# Patient Record
Sex: FEMALE | Race: WHITE | NOT HISPANIC OR LATINO | Employment: STUDENT | URBAN - NONMETROPOLITAN AREA
[De-identification: names, ages, dates, MRNs, and addresses within clinical notes are randomized per-mention and may not be internally consistent; named-entity substitution may affect disease eponyms.]

---
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
## 2018-08-08 ENCOUNTER — OFFICE VISIT (OUTPATIENT)
Dept: OBSTETRICS AND GYNECOLOGY | Facility: CLINIC | Age: 18
End: 2018-08-08

## 2018-08-08 VITALS
SYSTOLIC BLOOD PRESSURE: 110 MMHG | DIASTOLIC BLOOD PRESSURE: 70 MMHG | HEIGHT: 61 IN | WEIGHT: 137 LBS | BODY MASS INDEX: 25.86 KG/M2

## 2018-08-08 DIAGNOSIS — N91.2 AMENORRHEA: Primary | ICD-10-CM

## 2018-08-08 LAB
B-HCG UR QL: NEGATIVE
INTERNAL NEGATIVE CONTROL: NEGATIVE
INTERNAL POSITIVE CONTROL: POSITIVE
Lab: NORMAL

## 2018-08-08 PROCEDURE — 81025 URINE PREGNANCY TEST: CPT | Performed by: OBSTETRICS & GYNECOLOGY

## 2018-08-08 PROCEDURE — 99202 OFFICE O/P NEW SF 15 MIN: CPT | Performed by: OBSTETRICS & GYNECOLOGY

## 2018-08-08 RX ORDER — MEDROXYPROGESTERONE ACETATE 10 MG/1
10 TABLET ORAL DAILY
Qty: 7 TABLET | Refills: 0 | Status: SHIPPED | OUTPATIENT
Start: 2018-08-08 | End: 2018-08-24

## 2018-08-08 NOTE — PROGRESS NOTES
Subjective   Wendy Newby is a 18 y.o. female is here today as a self referral. CC: irregular periods.     19 yo  LMP  here for irregular cycles. Patient reports that her periods have been irregular for as long as she can remember. Last periods was  and prior to that [id number]. Patient had previously tried OCPS for 2 to 3 months but didn't like the side effects and reports that she felt faint.       Menstrual Problem   This is a chronic problem. The current episode started more than 1 year ago. The problem occurs intermittently. Pertinent negatives include no change in bowel habit, chills, fatigue, fever, nausea, visual change or vomiting. Nothing aggravates the symptoms.     Review of Systems   Constitutional: Negative for chills, fatigue and fever.   Gastrointestinal: Negative for change in bowel habit, diarrhea, nausea and vomiting.   Genitourinary: Positive for menstrual problem.       Objective   Physical Exam   Constitutional: She appears well-developed and well-nourished.   HENT:   Head: Normocephalic and atraumatic.   Cardiovascular: Normal rate and regular rhythm.    Pulmonary/Chest: Effort normal and breath sounds normal.   Skin: Skin is warm and dry. No bruising noted.   Vitals reviewed.        Assessment/Plan   Problems Addressed this Visit     None      Visit Diagnoses     Amenorrhea    -  Primary    Relevant Medications    medroxyPROGESTERone (PROVERA) 10 MG tablet    Other Relevant Orders    POC Pregnancy, Urine (Completed)    Hemoglobin A1c    Cortisol    DHEA-Sulfate    Testosterone    Progesterone    Luteinizing Hormone    Insulin, Total    Follicle Stimulating Hormone    Estradiol    Comprehensive Metabolic Panel    TSH    T4, Free    Prolactin          -Patient to return in 2 weeks. Patient sent for labs at this time. Given provera withdraw.

## 2018-08-08 NOTE — PROGRESS NOTES
Attempted to obtain health maintenance information, patient unable to provide answers for the following items Tdap, Meningitis vaccine,  HPV Vaccine and annual exam.

## 2018-08-10 LAB
ALBUMIN SERPL-MCNC: 4.2 G/DL (ref 3.5–5)
ALBUMIN/GLOB SERPL: 1.4 G/DL (ref 1.1–2.5)
ALP SERPL-CCNC: 140 U/L (ref 24–120)
ALT SERPL-CCNC: 22 U/L (ref 0–54)
AST SERPL-CCNC: 28 U/L (ref 7–45)
BILIRUB SERPL-MCNC: 0.4 MG/DL (ref 0.1–1)
BUN SERPL-MCNC: 12 MG/DL (ref 5–21)
BUN/CREAT SERPL: 16.9 (ref 7–25)
CALCIUM SERPL-MCNC: 9.6 MG/DL (ref 8.4–10.4)
CHLORIDE SERPL-SCNC: 103 MMOL/L (ref 98–110)
CO2 SERPL-SCNC: 27 MMOL/L (ref 24–31)
CORTIS SERPL-MCNC: 21.9 UG/DL
CREAT SERPL-MCNC: 0.71 MG/DL (ref 0.5–1.4)
DHEA-S SERPL-MCNC: 364.1 UG/DL (ref 110–431.7)
ESTRADIOL SERPL-MCNC: 112.3 PG/ML
FSH SERPL-ACNC: 7.4 MIU/ML
GLOBULIN SER CALC-MCNC: 2.9 GM/DL
GLUCOSE SERPL-MCNC: 81 MG/DL (ref 70–100)
HBA1C MFR BLD: 4.9 %
INSULIN SERPL-ACNC: 13.1 UIU/ML (ref 2.6–24.9)
LH SERPL-ACNC: 37.5 MIU/ML
POTASSIUM SERPL-SCNC: 4.6 MMOL/L (ref 3.5–5.3)
PROGEST SERPL-MCNC: 0.5 NG/ML
PROLACTIN SERPL-MCNC: 22.6 NG/ML (ref 4.8–23.3)
PROT SERPL-MCNC: 7.1 G/DL (ref 6.3–8.7)
SODIUM SERPL-SCNC: 140 MMOL/L (ref 135–145)
T4 FREE SERPL-MCNC: 0.98 NG/DL (ref 0.78–2.19)
TESTOST SERPL-MCNC: 65 NG/DL (ref 8–48)
TSH SERPL DL<=0.005 MIU/L-ACNC: 2.68 MIU/ML (ref 0.47–4.68)

## 2018-08-23 ENCOUNTER — OFFICE VISIT (OUTPATIENT)
Dept: OBSTETRICS AND GYNECOLOGY | Facility: CLINIC | Age: 18
End: 2018-08-23

## 2018-08-23 VITALS
HEIGHT: 61 IN | BODY MASS INDEX: 26.24 KG/M2 | WEIGHT: 139 LBS | DIASTOLIC BLOOD PRESSURE: 82 MMHG | SYSTOLIC BLOOD PRESSURE: 122 MMHG

## 2018-08-23 DIAGNOSIS — E28.2 POLYCYSTIC DISEASE, OVARIES: Primary | ICD-10-CM

## 2018-08-23 PROCEDURE — 99213 OFFICE O/P EST LOW 20 MIN: CPT | Performed by: OBSTETRICS & GYNECOLOGY

## 2018-08-23 NOTE — PROGRESS NOTES
Attempted to obtain health maintenance information, patient unable to provide answers for the following items Tdap, HPV Vaccine and annual exam, and meningitis vaccine..

## 2018-08-23 NOTE — PROGRESS NOTES
Subjective   Wendy Newby is a 18 y.o. female  YOB: 2000    17 yo  presents for follow up. Patient completed her provera and reported bleeding that started on Saturday. Previous period was in 2018. Lab work revealed testosterone.     Chief Complaint   Patient presents with   • Amenorrhea     Here for 2 week follow up for amenorrhea. Here to review labs from 18.  States she had a heavy period that started on Saturday. and she is still on her period, lighter now.        HPI    No Known Allergies    Past Medical History:   Diagnosis Date   • Allergic     seasonal allergies   • Vertigo     r/t to severe allergy symptoms       Family History   Problem Relation Age of Onset   • Diabetes Paternal Grandmother    • No Known Problems Father    • No Known Problems Mother    • No Known Problems Sister    • Breast cancer Neg Hx    • Ovarian cancer Neg Hx    • Colon cancer Neg Hx        Social History     Social History   • Marital status: Single     Spouse name: N/A   • Number of children: N/A   • Years of education: N/A     Occupational History   • Not on file.     Social History Main Topics   • Smoking status: Never Smoker   • Smokeless tobacco: Never Used   • Alcohol use No   • Drug use: No   • Sexual activity: No     Other Topics Concern   • Not on file     Social History Narrative   • No narrative on file         Current Outpatient Prescriptions:   •  cetirizine (zyrTEC) 10 MG tablet, Take 10 mg by mouth Daily As Needed for allergies., Disp: , Rfl:   •  medroxyPROGESTERone (PROVERA) 10 MG tablet, Take 1 tablet by mouth Daily., Disp: 7 tablet, Rfl: 0    Patient's last menstrual period was 2018 (exact date).    Sexual History:         Could not be calculated    Past Surgical History:   Procedure Laterality Date   • NO PAST SURGERIES         Review of Systems   Constitutional: Negative for activity change and unexpected weight loss.   HENT: Negative for congestion.    Cardiovascular: Negative  "for chest pain.   Gastrointestinal: Negative for blood in stool, constipation and diarrhea.   Endocrine: Negative for cold intolerance and heat intolerance.   Genitourinary: Negative for dyspareunia, pelvic pain and vaginal discharge.   Musculoskeletal: Negative for arthralgias, back pain, neck pain and neck stiffness.   Skin: Negative for rash.   Neurological: Negative for dizziness and headache.   Psychiatric/Behavioral: Negative for sleep disturbance. The patient is not nervous/anxious.        Objective   Physical Exam   Constitutional: She appears well-developed and well-nourished.   HENT:   Head: Normocephalic and atraumatic.   Eyes: EOM are normal.   Neck: Normal range of motion.   Pulmonary/Chest: Effort normal.         Vitals:    08/23/18 0943   BP: 122/82   BP Location: Left arm   Patient Position: Sitting   Cuff Size: Adult   Weight: 63 kg (139 lb)   Height: 154.9 cm (61\")       Wendy was seen today for amenorrhea.    Diagnoses and all orders for this visit:    Polycystic disease, ovaries    -Patient to be started on birth control at this time- Natty  -Instructed to take OCPs same time every day.   -Follow up in 3 months     Leonor Ledesma,        "

## 2018-08-24 RX ORDER — DROSPIRENONE AND ETHINYL ESTRADIOL 0.02-3(28)
1 KIT ORAL DAILY
Qty: 28 TABLET | Refills: 3 | Status: SHIPPED | OUTPATIENT
Start: 2018-08-24 | End: 2018-12-18 | Stop reason: SDUPTHER

## 2018-12-12 RX ORDER — ETHINYL ESTRADIOL/DROSPIRENONE 0.02-3(28)
TABLET ORAL
Qty: 28 TABLET | Refills: 2 | OUTPATIENT
Start: 2018-12-12

## 2018-12-13 RX ORDER — ETHINYL ESTRADIOL/DROSPIRENONE 0.02-3(28)
TABLET ORAL
Qty: 28 TABLET | Refills: 2 | OUTPATIENT
Start: 2018-12-13

## 2018-12-18 ENCOUNTER — OFFICE VISIT (OUTPATIENT)
Dept: OBSTETRICS AND GYNECOLOGY | Facility: CLINIC | Age: 18
End: 2018-12-18

## 2018-12-18 VITALS
SYSTOLIC BLOOD PRESSURE: 112 MMHG | HEIGHT: 61 IN | DIASTOLIC BLOOD PRESSURE: 70 MMHG | BODY MASS INDEX: 26.06 KG/M2 | WEIGHT: 138 LBS

## 2018-12-18 DIAGNOSIS — N92.6 IRREGULAR MENSES: Primary | ICD-10-CM

## 2018-12-18 DIAGNOSIS — Z30.41 ENCOUNTER FOR SURVEILLANCE OF CONTRACEPTIVE PILLS: ICD-10-CM

## 2018-12-18 PROCEDURE — 99213 OFFICE O/P EST LOW 20 MIN: CPT | Performed by: OBSTETRICS & GYNECOLOGY

## 2018-12-18 RX ORDER — DROSPIRENONE AND ETHINYL ESTRADIOL 0.02-3(28)
1 KIT ORAL DAILY
Qty: 28 TABLET | Refills: 3 | Status: SHIPPED | OUTPATIENT
Start: 2018-12-18 | End: 2019-03-22 | Stop reason: SDUPTHER

## 2018-12-18 NOTE — PROGRESS NOTES
Subjective   Wendy Newby is a 18 y.o. female  YOB: 2000        Chief Complaint   Patient presents with   • Med Refill     Patient here for medication refill of her OCP. Patient states she is doing well with this OCP and wishes to remain on it. Patient states that when she is supposed to start her period with the pills she isn't starting until the end before she starts taking a new pack.        19 yo  Patient's last menstrual period was 2018 presents for follow up examination. Patient has been on Natty for the past three months. Reports that her cycles are regular and she is happy on the medications at this time.         No Known Allergies    Past Medical History:   Diagnosis Date   • Allergic     seasonal allergies   • Vertigo     r/t to severe allergy symptoms       Family History   Problem Relation Age of Onset   • Diabetes Paternal Grandmother    • No Known Problems Father    • No Known Problems Mother    • No Known Problems Sister    • Breast cancer Neg Hx    • Ovarian cancer Neg Hx    • Colon cancer Neg Hx        Social History     Socioeconomic History   • Marital status: Single     Spouse name: Not on file   • Number of children: Not on file   • Years of education: Not on file   • Highest education level: Not on file   Social Needs   • Financial resource strain: Not on file   • Food insecurity - worry: Not on file   • Food insecurity - inability: Not on file   • Transportation needs - medical: Not on file   • Transportation needs - non-medical: Not on file   Occupational History   • Not on file   Tobacco Use   • Smoking status: Never Smoker   • Smokeless tobacco: Never Used   Substance and Sexual Activity   • Alcohol use: No   • Drug use: No   • Sexual activity: No   Other Topics Concern   • Not on file   Social History Narrative   • Not on file         Current Outpatient Medications:   •  cetirizine (zyrTEC) 10 MG tablet, Take 10 mg by mouth Daily As Needed for allergies., Disp: ,  "Rfl:   •  drospirenone-ethinyl estradiol (BRITTNEY,GIANVI) 3-0.02 MG per tablet, Take 1 tablet by mouth Daily., Disp: 28 tablet, Rfl: 3    Patient's last menstrual period was 12/16/2018.    Sexual History:         Could not be calculated    Past Surgical History:   Procedure Laterality Date   • NO PAST SURGERIES         Review of Systems   Genitourinary: Negative for vaginal bleeding and vaginal discharge.       Objective   Physical Exam   Constitutional: She is oriented to person, place, and time. She appears well-developed and well-nourished. No distress.   HENT:   Head: Normocephalic and atraumatic.   Eyes: EOM are normal.   Neck: Normal range of motion. No thyromegaly present.   Cardiovascular: Normal rate and regular rhythm.   No murmur heard.  Pulmonary/Chest: Effort normal and breath sounds normal. She has no wheezes.   Abdominal: Soft. She exhibits no distension. There is no tenderness.   Musculoskeletal: Normal range of motion.   Neurological: She is alert and oriented to person, place, and time.   Skin: Skin is warm and dry.   Psychiatric: She has a normal mood and affect. Her behavior is normal. Judgment normal.   Nursing note and vitals reviewed.        Vitals:    12/18/18 1043   BP: 112/70   Weight: 62.6 kg (138 lb)   Height: 154.9 cm (61\")       Wendy was seen today for med refill.    Diagnoses and all orders for this visit:    Irregular menses  -     Cortisol    Encounter for surveillance of contraceptive pills    Other orders  -     drospirenone-ethinyl estradiol (BRITTNEY,GIANVI) 3-0.02 MG per tablet; Take 1 tablet by mouth Daily.    -OCP sent to pharmacy   -Return to clinic for annual examination in 9 months or prn.   -Cortisol leave reordered at this time with results to follow.     Leonor Ledesma, DO       "

## 2018-12-19 LAB — CORTIS SERPL-MCNC: 24.3 UG/DL

## 2019-01-07 ENCOUNTER — OFFICE VISIT (OUTPATIENT)
Dept: FAMILY MEDICINE CLINIC | Facility: CLINIC | Age: 19
End: 2019-01-07

## 2019-01-07 VITALS
RESPIRATION RATE: 16 BRPM | HEART RATE: 77 BPM | HEIGHT: 61 IN | SYSTOLIC BLOOD PRESSURE: 116 MMHG | BODY MASS INDEX: 25.11 KG/M2 | DIASTOLIC BLOOD PRESSURE: 80 MMHG | WEIGHT: 133 LBS | OXYGEN SATURATION: 97 %

## 2019-01-07 DIAGNOSIS — F41.9 ANXIETY: Primary | ICD-10-CM

## 2019-01-07 PROCEDURE — 99202 OFFICE O/P NEW SF 15 MIN: CPT | Performed by: FAMILY MEDICINE

## 2019-01-07 RX ORDER — ESCITALOPRAM OXALATE 10 MG/1
10 TABLET ORAL DAILY
Qty: 30 TABLET | Refills: 1 | Status: SHIPPED | OUTPATIENT
Start: 2019-01-07 | End: 2019-03-04 | Stop reason: SDUPTHER

## 2019-01-07 NOTE — PROGRESS NOTES
"Subjective   Wendy Newby is a 18 y.o. female.     Chief Complaint   Patient presents with   • Establish Care   • Anxiety     pt states that she has had issues w/ anxiety in the past, but it has gotten more intense recently       History of Present Illness     here today wtih her mother --she notes having anxiety for about 5 years---worse during the first day of her period...      Current Outpatient Medications:   •  cetirizine (zyrTEC) 10 MG tablet, Take 10 mg by mouth Daily As Needed for allergies., Disp: , Rfl:   •  drospirenone-ethinyl estradiol (BRITTNEY,GIANVI) 3-0.02 MG per tablet, Take 1 tablet by mouth Daily., Disp: 28 tablet, Rfl: 3  •  escitalopram (LEXAPRO) 10 MG tablet, Take 1 tablet by mouth Daily., Disp: 30 tablet, Rfl: 1  No Known Allergies    Past Medical History:   Diagnosis Date   • Allergic     seasonal allergies   • Vertigo     r/t to severe allergy symptoms     Past Surgical History:   Procedure Laterality Date   • NO PAST SURGERIES         Review of Systems   Constitutional: Negative.    HENT: Negative.    Eyes: Negative.    Respiratory: Negative.    Cardiovascular: Negative.    Gastrointestinal: Negative.    Endocrine: Negative.    Genitourinary: Negative.    Musculoskeletal: Negative.    Skin: Negative.    Allergic/Immunologic: Negative.    Neurological: Negative.    Hematological: Negative.    Psychiatric/Behavioral: Negative.        Objective  /80   Pulse 77   Resp 16   Ht 154.9 cm (61\")   Wt 60.3 kg (133 lb)   LMP 12/16/2018   SpO2 97%   BMI 25.13 kg/m²   Physical Exam   Constitutional: She is oriented to person, place, and time. She appears well-developed and well-nourished.   HENT:   Head: Normocephalic and atraumatic.   Right Ear: External ear normal.   Left Ear: External ear normal.   Nose: Nose normal.   Mouth/Throat: Oropharynx is clear and moist.   Eyes: Conjunctivae and EOM are normal. Pupils are equal, round, and reactive to light.   Neck: Normal range of motion. Neck " supple.   Cardiovascular: Normal rate, regular rhythm, normal heart sounds and intact distal pulses.   Pulmonary/Chest: Effort normal and breath sounds normal.   Abdominal: Soft. Bowel sounds are normal.   Musculoskeletal: Normal range of motion.   Neurological: She is alert and oriented to person, place, and time.   Skin: Skin is warm. Capillary refill takes less than 2 seconds.   Psychiatric: She has a normal mood and affect. Her behavior is normal. Judgment and thought content normal.   Nursing note and vitals reviewed.      Assessment/Plan   Wendy was seen today for establish care and anxiety.    Diagnoses and all orders for this visit:    Anxiety  -     TSH  -     T4, free    Other orders  -     escitalopram (LEXAPRO) 10 MG tablet; Take 1 tablet by mouth Daily.                  Orders Placed This Encounter   Procedures   • TSH   • T4, free       Follow up: 6 week(s)

## 2019-01-08 LAB
T4 FREE SERPL-MCNC: 1.2 NG/DL (ref 0.78–2.19)
TSH SERPL DL<=0.005 MIU/L-ACNC: 1.67 MIU/ML (ref 0.47–4.68)

## 2019-01-11 ENCOUNTER — LAB (OUTPATIENT)
Dept: LAB | Facility: HOSPITAL | Age: 19
End: 2019-01-11
Attending: FAMILY MEDICINE

## 2019-01-11 ENCOUNTER — TELEPHONE (OUTPATIENT)
Dept: FAMILY MEDICINE CLINIC | Facility: CLINIC | Age: 19
End: 2019-01-11

## 2019-01-11 DIAGNOSIS — R14.2 BELCHING: ICD-10-CM

## 2019-01-11 DIAGNOSIS — R14.2 BELCHING: Primary | ICD-10-CM

## 2019-01-11 PROCEDURE — 36415 COLL VENOUS BLD VENIPUNCTURE: CPT

## 2019-01-11 PROCEDURE — 86677 HELICOBACTER PYLORI ANTIBODY: CPT | Performed by: FAMILY MEDICINE

## 2019-01-11 NOTE — TELEPHONE ENCOUNTER
Pt is wanting to go to [facility] today to get them done will u put the orders in so they wont be calling with issues

## 2019-01-11 NOTE — TELEPHONE ENCOUNTER
Regarding: FW: Non-Urgent Medical Question  Contact: [phone]      ----- Message -----  From: Wendy Newby  Sent: 1/10/2019   2:37 PM  To: [facility]  Subject: Non-Urgent Medical Question                      ----- Message from Mychart, Generic sent at 1/10/2019  3:37 PM EST -----    Dr. Wheeler,    I would like to do the blood test for the H-Pylori. What do I need to do to get that set-up?    Thanks,  Wendy Germain for the h pylori blood test

## 2019-01-14 LAB
H PYLORI IGA SER IA-ACNC: <9 UNITS (ref 0–8.9)
H PYLORI IGG SER IA-ACNC: 0.43 INDEX VALUE (ref 0–0.79)
H PYLORI IGM SER-ACNC: <9 UNITS (ref 0–8.9)
H PYLORI IGM SER-ACNC: <9 UNITS (ref 0–8.9)

## 2019-01-15 ENCOUNTER — TELEPHONE (OUTPATIENT)
Dept: FAMILY MEDICINE CLINIC | Facility: CLINIC | Age: 19
End: 2019-01-15

## 2019-01-15 ENCOUNTER — OFFICE VISIT (OUTPATIENT)
Dept: OBSTETRICS AND GYNECOLOGY | Facility: CLINIC | Age: 19
End: 2019-01-15

## 2019-01-15 VITALS
BODY MASS INDEX: 24.73 KG/M2 | DIASTOLIC BLOOD PRESSURE: 82 MMHG | HEIGHT: 61 IN | WEIGHT: 131 LBS | SYSTOLIC BLOOD PRESSURE: 114 MMHG

## 2019-01-15 DIAGNOSIS — R79.89 ELEVATED MORNING SERUM CORTISOL LEVEL: ICD-10-CM

## 2019-01-15 DIAGNOSIS — E28.2 PCOS (POLYCYSTIC OVARIAN SYNDROME): Primary | ICD-10-CM

## 2019-01-15 DIAGNOSIS — F41.9 ANXIETY: ICD-10-CM

## 2019-01-15 DIAGNOSIS — R14.2 BELCHING: Primary | ICD-10-CM

## 2019-01-15 PROCEDURE — 99213 OFFICE O/P EST LOW 20 MIN: CPT | Performed by: OBSTETRICS & GYNECOLOGY

## 2019-01-15 NOTE — TELEPHONE ENCOUNTER
Regarding: FW: Test Results Question  Contact: [phone]   dont know if u got this yesterday or not  ----- Message -----  From: Wendy Newby  Sent: 1/14/2019   1:49 PM  To: [facility]  Subject: Test Results Question                            ----- Message from Mychart, Generic sent at 1/14/2019  2:49 PM EST -----    Dr. Wheeler,    I just saw that my test results came in for H-pylori and are negative. What is the next course of action?       Thanks,  Wendy Llamas should have a to do from yesterday

## 2019-01-16 ENCOUNTER — OFFICE VISIT (OUTPATIENT)
Dept: GASTROENTEROLOGY | Facility: CLINIC | Age: 19
End: 2019-01-16

## 2019-01-16 VITALS
OXYGEN SATURATION: 98 % | HEART RATE: 67 BPM | DIASTOLIC BLOOD PRESSURE: 70 MMHG | HEIGHT: 61 IN | SYSTOLIC BLOOD PRESSURE: 118 MMHG | WEIGHT: 133 LBS | BODY MASS INDEX: 25.11 KG/M2

## 2019-01-16 DIAGNOSIS — R10.13 DYSPEPSIA: Primary | ICD-10-CM

## 2019-01-16 DIAGNOSIS — F41.9 ANXIETY: ICD-10-CM

## 2019-01-16 PROCEDURE — 99203 OFFICE O/P NEW LOW 30 MIN: CPT | Performed by: CLINICAL NURSE SPECIALIST

## 2019-01-16 NOTE — PROGRESS NOTES
"Subjective   Wendy Newby is a 18 y.o. female.     Chief Complaint   Patient presents with   • Contraception     Pt is here to discuss her OCP. States she has had more anxiety lately.        19 yo  Patient's last menstrual period was 2018 presents for follow up examination. Patient reports that she has increased anxiety at this time. Patient was recently placed on Lexapro. Patient also had elevated cortisol on two separate occasions. No other complaints at this time.        Review of Systems   Psychiatric/Behavioral: The patient is nervous/anxious.      Objective   /82 (BP Location: Left arm, Patient Position: Sitting, Cuff Size: Adult)   Ht 154.9 cm (61\")   Wt 59.4 kg (131 lb)   LMP 2018   BMI 24.75 kg/m²   Patient's last menstrual period was 2018.  Physical Exam   Constitutional: She is oriented to person, place, and time. She appears well-developed and well-nourished.   HENT:   Head: Normocephalic and atraumatic.   Eyes: EOM are normal.   Neck: Normal range of motion.   Pulmonary/Chest: Effort normal.   Musculoskeletal: Normal range of motion.   Neurological: She is alert and oriented to person, place, and time.   Skin: Skin is warm and dry. No erythema.   Psychiatric: She has a normal mood and affect. Her behavior is normal. Judgment normal.   Nursing note and vitals reviewed.        Assessment/Plan   Problems Addressed this Visit        Other    Anxiety      Other Visit Diagnoses     PCOS (polycystic ovarian syndrome)    -  Primary    Elevated morning serum cortisol level          -Return to clinic in March for PAP smear or prn if symptoms get worse.        Leonor Ledesma, DO  "

## 2019-01-16 NOTE — PROGRESS NOTES
Wendy Newby  2000    1/16/2019  Chief Complaint   Patient presents with   • GI Problem     New patient ref by Dr. Wheeler for reflux and belching     Subjective   HPI  Wendy Newby is a 18 y.o. female who presents with a complaint of persistent ongoing gas and belching, dyspepsia for approx 2 months. No certain triggers other than some anxiety which she has just started lexapro for. She says that this has helped her slightly. Her PCP tested her for Hpylori and this was negative. She denies any diarrhea or constipation. No nausea or vomiting. No dysphagia. No wt loss. No melena. No bRBPR. No previous endoscopy. No tobacco, no NSAIDs. No food triggers identified.  Past Medical History:   Diagnosis Date   • Allergic     seasonal allergies   • Vertigo     r/t to severe allergy symptoms     Past Surgical History:   Procedure Laterality Date   • NO PAST SURGERIES         Outpatient Medications Marked as Taking for the 1/16/19 encounter (Office Visit) with Isadora Miranda APRN   Medication Sig Dispense Refill   • cetirizine (zyrTEC) 10 MG tablet Take 10 mg by mouth Daily As Needed for allergies.     • drospirenone-ethinyl estradiol (BRITTNEY,GIANVI) 3-0.02 MG per tablet Take 1 tablet by mouth Daily. 28 tablet 3   • escitalopram (LEXAPRO) 10 MG tablet Take 1 tablet by mouth Daily. 30 tablet 1     No Known Allergies  Social History     Socioeconomic History   • Marital status: Single     Spouse name: Not on file   • Number of children: Not on file   • Years of education: Not on file   • Highest education level: Not on file   Social Needs   • Financial resource strain: Not on file   • Food insecurity - worry: Not on file   • Food insecurity - inability: Not on file   • Transportation needs - medical: Not on file   • Transportation needs - non-medical: Not on file   Occupational History   • Not on file   Tobacco Use   • Smoking status: Never Smoker   • Smokeless tobacco: Never Used   Substance and Sexual Activity   •  Alcohol use: No   • Drug use: No   • Sexual activity: No     Birth control/protection: OCP   Other Topics Concern   • Not on file   Social History Narrative   • Not on file     Family History   Problem Relation Age of Onset   • Diabetes Paternal Grandmother    • No Known Problems Father    • No Known Problems Mother    • No Known Problems Sister    • Breast cancer Neg Hx    • Ovarian cancer Neg Hx    • Colon cancer Neg Hx    • Colon polyps Neg Hx      Health Maintenance   Topic Date Due   • ANNUAL PHYSICAL  03/07/2001   • HPV VACCINES (1 - Female 3-dose series) 03/07/2009   • MENINGOCOCCAL VACCINE (Normal Risk) (1 - 2-dose series) 03/07/2014   • TDAP/TD VACCINES (1 - Tdap) 03/07/2017   • INFLUENZA VACCINE  08/01/2018     Review of Systems   Constitutional: Negative for activity change, appetite change, chills, diaphoresis, fatigue, fever and unexpected weight change.   HENT: Negative for ear pain, hearing loss, mouth sores, sore throat, trouble swallowing and voice change.    Eyes: Negative.    Respiratory: Negative for cough, choking, shortness of breath and wheezing.    Cardiovascular: Negative for chest pain and palpitations.   Gastrointestinal: Negative for abdominal pain, blood in stool, constipation, diarrhea, nausea and vomiting.   Endocrine: Negative for cold intolerance and heat intolerance.   Genitourinary: Negative for decreased urine volume, dysuria, frequency, hematuria and urgency.   Musculoskeletal: Negative for back pain, gait problem and myalgias.   Skin: Negative for color change, pallor and rash.   Allergic/Immunologic: Negative for food allergies and immunocompromised state.   Neurological: Negative for dizziness, tremors, seizures, syncope, weakness, light-headedness, numbness and headaches.   Hematological: Negative for adenopathy. Does not bruise/bleed easily.   Psychiatric/Behavioral: Negative for agitation and confusion. The patient is not nervous/anxious.    All other systems reviewed and  "are negative.    Objective   Vitals:    01/16/19 1413   BP: 118/70   Pulse: 67   SpO2: 98%   Weight: 60.3 kg (133 lb)   Height: 154.9 cm (61\")     Body mass index is 25.13 kg/m².  Physical Exam   Constitutional: She is oriented to person, place, and time. She appears well-developed and well-nourished.   HENT:   Head: Normocephalic and atraumatic.   Eyes: Pupils are equal, round, and reactive to light.   Neck: Normal range of motion. Neck supple. No tracheal deviation present.   Cardiovascular: Normal rate, regular rhythm and normal heart sounds. Exam reveals no gallop and no friction rub.   No murmur heard.  Pulmonary/Chest: Effort normal and breath sounds normal. No respiratory distress. She has no wheezes. She has no rales. She exhibits no tenderness.   Abdominal: Soft. Bowel sounds are normal. She exhibits no distension. There is no hepatosplenomegaly. There is no tenderness. There is no rigidity, no rebound and no guarding.   Musculoskeletal: Normal range of motion. She exhibits no edema, tenderness or deformity.   Neurological: She is alert and oriented to person, place, and time. She has normal reflexes.   Skin: Skin is warm and dry. No rash noted. No pallor.   Psychiatric: She has a normal mood and affect. Her behavior is normal. Judgment and thought content normal.     Assessment/Plan   Wendy was seen today for gi problem.    Diagnoses and all orders for this visit:    Dyspepsia    Anxiety    suggested she start a trial of Zantac and treat her gas with otc regimen. I also went over food triggers and would like for her to be on her Lexapro a little longer to see if symptoms improve or go away with management of her anxiety she does feel as if this could be a contributing factor.     EMR Dragon/transcription disclaimer: Much of this encounter note is electronic transcription/translation of spoken language to printed text. The electronic translation of spoken language may be erroneous, or at times, nonsensical " words or phrases may be inadvertently transcribed. Although I have reviewed the note for such errors, some may still exist.  Body mass index is 25.13 kg/m².  Return in about 1 month (around 2/16/2019).    Patient's Body mass index is 25.13 kg/m². BMI is within normal parameters. No follow-up required..      All risks, benefits, alternatives, and indications of colonoscopy and/or Endoscopy procedure have been discussed with the patient. Risks to include perforation of the colon requiring possible surgery or colostomy, risk of bleeding from biopsies or removal of colon tissue, possibility of missing a colon polyp or cancer, or adverse drug reaction.  Benefits to include the diagnosis and management of disease of the colon and rectum. Alternatives to include barium enema, radiographic evaluation, lab testing or no intervention. Pt verbalizes understanding and agrees.     Isadora Miranda, APRN  1/16/2019  3:00 PM      Obesity, Adult  Obesity is the condition of having too much total body fat. Being overweight or obese means that your weight is greater than what is considered healthy for your body size. Obesity is determined by a measurement called BMI. BMI is an estimate of body fat and is calculated from height and weight. For adults, a BMI of 30 or higher is considered obese.  Obesity can eventually lead to other health concerns and major illnesses, including:  · Stroke.  · Coronary artery disease (CAD).  · Type 2 diabetes.  · Some types of cancer, including cancers of the colon, breast, uterus, and gallbladder.  · Osteoarthritis.  · High blood pressure (hypertension).  · High cholesterol.  · Sleep apnea.  · Gallbladder stones.  · Infertility problems.  What are the causes?  The main cause of obesity is taking in (consuming) more calories than your body uses for energy. Other factors that contribute to this condition may include:  · Being born with genes that make you more likely to become obese.  · Having a  medical condition that causes obesity. These conditions include:  ¨ Hypothyroidism.  ¨ Polycystic ovarian syndrome (PCOS).  ¨ Binge-eating disorder.  ¨ Cushing syndrome.  · Taking certain medicines, such as steroids, antidepressants, and seizure medicines.  · Not being physically active (sedentary lifestyle).  · Living where there are limited places to exercise safely or buy healthy foods.  · Not getting enough sleep.  What increases the risk?  The following factors may increase your risk of this condition:  · Having a family history of obesity.  · Being a woman of -American descent.  · Being a man of  descent.  What are the signs or symptoms?  Having excessive body fat is the main symptom of this condition.  How is this diagnosed?  This condition may be diagnosed based on:  · Your symptoms.  · Your medical history.  · A physical exam. Your health care provider may measure:  ¨ Your BMI. If you are an adult with a BMI between 25 and less than 30, you are considered overweight. If you are an adult with a BMI of 30 or higher, you are considered obese.  ¨ The distances around your hips and your waist (circumferences). These may be compared to each other to help diagnose your condition.  ¨ Your skinfold thickness. Your health care provider may gently pinch a fold of your skin and measure it.  How is this treated?  Treatment for this condition often includes changing your lifestyle. Treatment may include some or all of the following:  · Dietary changes. Work with your health care provider and a dietitian to set a weight-loss goal that is healthy and reasonable for you. Dietary changes may include eating:  ¨ Smaller portions. A portion size is the amount of a particular food that is healthy for you to eat at one time. This varies from person to person.  ¨ Low-calorie or low-fat options.  ¨ More whole grains, fruits, and vegetables.  · Regular physical activity. This may include aerobic activity (cardio) and  strength training.  · Medicine to help you lose weight. Your health care provider may prescribe medicine if you are unable to lose 1 pound a week after 6 weeks of eating more healthily and doing more physical activity.  · Surgery. Surgical options may include gastric banding and gastric bypass. Surgery may be done if:  ¨ Other treatments have not helped to improve your condition.  ¨ You have a BMI of 40 or higher.  ¨ You have life-threatening health problems related to obesity.  Follow these instructions at home:     Eating and drinking     · Follow recommendations from your health care provider about what you eat and drink. Your health care provider may advise you to:  ¨ Limit fast foods, sweets, and processed snack foods.  ¨ Choose low-fat options, such as low-fat milk instead of whole milk.  ¨ Eat 5 or more servings of fruits or vegetables every day.  ¨ Eat at home more often. This gives you more control over what you eat.  ¨ Choose healthy foods when you eat out.  ¨ Learn what a healthy portion size is.  ¨ Keep low-fat snacks on hand.  ¨ Avoid sugary drinks, such as soda, fruit juice, iced tea sweetened with sugar, and flavored milk.  ¨ Eat a healthy breakfast.  · Drink enough water to keep your urine clear or pale yellow.  · Do not go without eating for long periods of time (do not fast) or follow a fad diet. Fasting and fad diets can be unhealthy and even dangerous.  Physical Activity   · Exercise regularly, as told by your health care provider. Ask your health care provider what types of exercise are safe for you and how often you should exercise.  · Warm up and stretch before being active.  · Cool down and stretch after being active.  · Rest between periods of activity.  Lifestyle   · Limit the time that you spend in front of your TV, computer, or video game system.  · Find ways to reward yourself that do not involve food.  · Limit alcohol intake to no more than 1 drink a day for nonpregnant women and 2  drinks a day for men. One drink equals 12 oz of beer, 5 oz of wine, or 1½ oz of hard liquor.  General instructions   · Keep a weight loss journal to keep track of the food you eat and how much you exercise you get.  · Take over-the-counter and prescription medicines only as told by your health care provider.  · Take vitamins and supplements only as told by your health care provider.  · Consider joining a support group. Your health care provider may be able to recommend a support group.  · Keep all follow-up visits as told by your health care provider. This is important.  Contact a health care provider if:  · You are unable to meet your weight loss goal after 6 weeks of dietary and lifestyle changes.  This information is not intended to replace advice given to you by your health care provider. Make sure you discuss any questions you have with your health care provider.  Document Released: 01/25/2006 Document Revised: 05/22/2017 Document Reviewed: 10/05/2016  [employer] Interactive Patient Education © 2017 [employer]      If you smoke or use tobacco, 4 minutes reading provided  Steps to Quit Smoking  Smoking tobacco can be harmful to your health and can affect almost every organ in your body. Smoking puts you, and those around you, at risk for developing many serious chronic diseases. Quitting smoking is difficult, but it is one of the best things that you can do for your health. It is never too late to quit.  What are the benefits of quitting smoking?  When you quit smoking, you lower your risk of developing serious diseases and conditions, such as:  · Lung cancer or lung disease, such as COPD.  · Heart disease.  · Stroke.  · Heart attack.  · Infertility.  · Osteoporosis and bone fractures.  Additionally, symptoms such as coughing, wheezing, and shortness of breath may get better when you quit. You may also find that you get sick less often because your body is stronger at fighting off colds and infections. If you  are pregnant, quitting smoking can help to reduce your chances of having a baby of low birth weight.  How do I get ready to quit?  When you decide to quit smoking, create a plan to make sure that you are successful. Before you quit:  · Pick a date to quit. Set a date within the next two weeks to give you time to prepare.  · Write down the reasons why you are quitting. Keep this list in places where you will see it often, such as on your bathroom mirror or in your car or wallet.  · Identify the people, places, things, and activities that make you want to smoke (triggers) and avoid them. Make sure to take these actions:  ¨ Throw away all cigarettes at home, at work, and in your car.  ¨ Throw away smoking accessories, such as ashtrays and lighters.  ¨ Clean your car and make sure to empty the ashtray.  ¨ Clean your home, including curtains and carpets.  · Tell your family, friends, and coworkers that you are quitting. Support from your loved ones can make quitting easier.  · Talk with your health care provider about your options for quitting smoking.  · Find out what treatment options are covered by your health insurance.  What strategies can I use to quit smoking?  Talk with your healthcare provider about different strategies to quit smoking. Some strategies include:  · Quitting smoking altogether instead of gradually lessening how much you smoke over a period of time. Research shows that quitting “cold turkey” is more successful than gradually quitting.  · Attending in-person counseling to help you build problem-solving skills. You are more likely to have success in quitting if you attend several counseling sessions. Even short sessions of 10 minutes can be effective.  · Finding resources and support systems that can help you to quit smoking and remain smoke-free after you quit. These resources are most helpful when you use them often. They can include:  ¨ Online chats with a counselor.  ¨ Telephone  quitlines.  ¨ Printed self-help materials.  ¨ Support groups or group counseling.  ¨ Text messaging programs.  ¨ Mobile phone applications.  · Taking medicines to help you quit smoking. (If you are pregnant or breastfeeding, talk with your health care provider first.) Some medicines contain nicotine and some do not. Both types of medicines help with cravings, but the medicines that include nicotine help to relieve withdrawal symptoms. Your health care provider may recommend:  ¨ Nicotine patches, gum, or lozenges.  ¨ Nicotine inhalers or sprays.  ¨ Non-nicotine medicine that is taken by mouth.  Talk with your health care provider about combining strategies, such as taking medicines while you are also receiving in-person counseling. Using these two strategies together makes you more likely to succeed in quitting than if you used either strategy on its own.  If you are pregnant or breastfeeding, talk with your health care provider about finding counseling or other support strategies to quit smoking. Do not take medicine to help you quit smoking unless told to do so by your health care provider.  What things can I do to make it easier to quit?  Quitting smoking might feel overwhelming at first, but there is a lot that you can do to make it easier. Take these important actions:  · Reach out to your family and friends and ask that they support and encourage you during this time. Call telephone quitlines, reach out to support groups, or work with a counselor for support.  · Ask people who smoke to avoid smoking around you.  · Avoid places that trigger you to smoke, such as bars, parties, or smoke-break areas at work.  · Spend time around people who do not smoke.  · Lessen stress in your life, because stress can be a smoking trigger for some people. To lessen stress, try:  ¨ Exercising regularly.  ¨ Deep-breathing exercises.  ¨ Yoga.  ¨ Meditating.  ¨ Performing a body scan. This involves closing your eyes, scanning your  body from head to toe, and noticing which parts of your body are particularly tense. Purposefully relax the muscles in those areas.  · Download or purchase mobile phone or tablet apps (applications) that can help you stick to your quit plan by providing reminders, tips, and encouragement. There are many free apps, such as QuitGuide from the CDC (Centers for Disease Control and Prevention). You can find other support for quitting smoking (smoking cessation) through smokefree.gov and other websites.  How will I feel when I quit smoking?  Within the first 24 hours of quitting smoking, you may start to feel some withdrawal symptoms. These symptoms are usually most noticeable 2-3 days after quitting, but they usually do not last beyond 2-3 weeks. Changes or symptoms that you might experience include:  · Mood swings.  · Restlessness, anxiety, or irritation.  · Difficulty concentrating.  · Dizziness.  · Strong cravings for sugary foods in addition to nicotine.  · Mild weight gain.  · Constipation.  · Nausea.  · Coughing or a sore throat.  · Changes in how your medicines work in your body.  · A depressed mood.  · Difficulty sleeping (insomnia).  After the first 2-3 weeks of quitting, you may start to notice more positive results, such as:  · Improved sense of smell and taste.  · Decreased coughing and sore throat.  · Slower heart rate.  · Lower blood pressure.  · Clearer skin.  · The ability to breathe more easily.  · Fewer sick days.  Quitting smoking is very challenging for most people. Do not get discouraged if you are not successful the first time. Some people need to make many attempts to quit before they achieve long-term success. Do your best to stick to your quit plan, and talk with your health care provider if you have any questions or concerns.  This information is not intended to replace advice given to you by your health care provider. Make sure you discuss any questions you have with your health care  provider.  Document Released: 12/12/2002 Document Revised: 08/15/2017 Document Reviewed: 05/03/2016  [employer] Interactive Patient Education © 2017 [employer]

## 2019-02-18 ENCOUNTER — OFFICE VISIT (OUTPATIENT)
Dept: FAMILY MEDICINE CLINIC | Facility: CLINIC | Age: 19
End: 2019-02-18

## 2019-02-18 VITALS
HEIGHT: 61 IN | WEIGHT: 137 LBS | BODY MASS INDEX: 25.86 KG/M2 | OXYGEN SATURATION: 99 % | SYSTOLIC BLOOD PRESSURE: 116 MMHG | DIASTOLIC BLOOD PRESSURE: 80 MMHG | RESPIRATION RATE: 18 BRPM | TEMPERATURE: 98.1 F | HEART RATE: 89 BPM

## 2019-02-18 DIAGNOSIS — F41.9 ANXIETY: Primary | ICD-10-CM

## 2019-02-18 PROCEDURE — 99213 OFFICE O/P EST LOW 20 MIN: CPT | Performed by: FAMILY MEDICINE

## 2019-02-18 NOTE — PROGRESS NOTES
"Subjective   Wendy Newby is a 18 y.o. female.     Chief Complaint   Patient presents with   • Anxiety     6 month follow-up.       History of Present Illness     she thinks her anxiety is much better---sleeping better--denies any side effects from the medication      Current Outpatient Medications:   •  cetirizine (zyrTEC) 10 MG tablet, Take 10 mg by mouth Daily As Needed for allergies., Disp: , Rfl:   •  drospirenone-ethinyl estradiol (BRITTNEY,GIANVI) 3-0.02 MG per tablet, Take 1 tablet by mouth Daily., Disp: 28 tablet, Rfl: 3  •  escitalopram (LEXAPRO) 10 MG tablet, Take 1 tablet by mouth Daily., Disp: 30 tablet, Rfl: 1  No Known Allergies    Past Medical History:   Diagnosis Date   • Allergic     seasonal allergies   • Vertigo     r/t to severe allergy symptoms     Past Surgical History:   Procedure Laterality Date   • NO PAST SURGERIES         Review of Systems   Constitutional: Negative.    HENT: Negative.    Eyes: Negative.    Respiratory: Negative.    Cardiovascular: Negative.    Gastrointestinal: Negative.    Endocrine: Negative.    Genitourinary: Negative.    Musculoskeletal: Negative.    Skin: Negative.    Allergic/Immunologic: Negative.    Neurological: Negative.    Hematological: Negative.    Psychiatric/Behavioral: The patient is nervous/anxious.        Objective  /80 (BP Location: Left arm, Patient Position: Sitting, Cuff Size: Adult)   Pulse 89   Temp 98.1 °F (36.7 °C) (Oral)   Resp 18   Ht 154.9 cm (61\")   Wt 62.1 kg (137 lb)   SpO2 99%   BMI 25.89 kg/m²   Physical Exam   Constitutional: She is oriented to person, place, and time. She appears well-developed and well-nourished.   HENT:   Head: Normocephalic and atraumatic.   Right Ear: External ear normal.   Left Ear: External ear normal.   Nose: Nose normal.   Mouth/Throat: Oropharynx is clear and moist.   Eyes: Conjunctivae and EOM are normal. Pupils are equal, round, and reactive to light.   Neck: Normal range of motion. Neck supple. "   Cardiovascular: Normal rate, regular rhythm, normal heart sounds and intact distal pulses.   Pulmonary/Chest: Effort normal and breath sounds normal.   Abdominal: Soft. Bowel sounds are normal.   Musculoskeletal: Normal range of motion.   Neurological: She is alert and oriented to person, place, and time.   Skin: Skin is warm. Capillary refill takes less than 2 seconds.   Psychiatric: She has a normal mood and affect. Her behavior is normal. Judgment and thought content normal.   Nursing note and vitals reviewed.      Assessment/Plan   Wendy was seen today for anxiety.    Diagnoses and all orders for this visit:    Anxiety                 No orders of the defined types were placed in this encounter.      Follow up: 6 month(s)

## 2019-02-19 ENCOUNTER — OFFICE VISIT (OUTPATIENT)
Dept: GASTROENTEROLOGY | Facility: CLINIC | Age: 19
End: 2019-02-19

## 2019-02-19 VITALS
HEART RATE: 93 BPM | WEIGHT: 133 LBS | BODY MASS INDEX: 25.11 KG/M2 | SYSTOLIC BLOOD PRESSURE: 128 MMHG | DIASTOLIC BLOOD PRESSURE: 72 MMHG | OXYGEN SATURATION: 99 % | HEIGHT: 61 IN

## 2019-02-19 DIAGNOSIS — F41.9 ANXIETY: ICD-10-CM

## 2019-02-19 DIAGNOSIS — R10.13 DYSPEPSIA: Primary | ICD-10-CM

## 2019-02-19 PROCEDURE — 99212 OFFICE O/P EST SF 10 MIN: CPT | Performed by: CLINICAL NURSE SPECIALIST

## 2019-02-19 NOTE — PROGRESS NOTES
Wendy Newby  2000      2/19/2019  Chief Complaint   Patient presents with   • GI Problem     Here to discuss reflux doing better since taking anxiety medicine         HPI    Wendy Newby is a  18 y.o. female here for a follow up visit for complaint of persistent ongoing gas and bleching, dyspepsia for approx 2 months. She felt it was related to her anxiety level and had just started lexapro. Today she says that she is better. No persistent symptoms of reflux or GERD.     Past Medical History:   Diagnosis Date   • Allergic     seasonal allergies   • Vertigo     r/t to severe allergy symptoms     Past Surgical History:   Procedure Laterality Date   • NO PAST SURGERIES         Outpatient Medications Marked as Taking for the 2/19/19 encounter (Office Visit) with Isadora Miranda APRN   Medication Sig Dispense Refill   • cetirizine (zyrTEC) 10 MG tablet Take 10 mg by mouth Daily As Needed for allergies.     • drospirenone-ethinyl estradiol (BRITTNEY,GIANVI) 3-0.02 MG per tablet Take 1 tablet by mouth Daily. 28 tablet 3   • escitalopram (LEXAPRO) 10 MG tablet Take 1 tablet by mouth Daily. 30 tablet 1       No Known Allergies    Social History     Socioeconomic History   • Marital status: Single     Spouse name: Not on file   • Number of children: Not on file   • Years of education: Not on file   • Highest education level: Not on file   Social Needs   • Financial resource strain: Not on file   • Food insecurity - worry: Not on file   • Food insecurity - inability: Not on file   • Transportation needs - medical: Not on file   • Transportation needs - non-medical: Not on file   Occupational History   • Not on file   Tobacco Use   • Smoking status: Never Smoker   • Smokeless tobacco: Never Used   Substance and Sexual Activity   • Alcohol use: No   • Drug use: No   • Sexual activity: No     Birth control/protection: OCP   Other Topics Concern   • Not on file   Social History Narrative   • Not on file       Family  "History   Problem Relation Age of Onset   • Diabetes Paternal Grandmother    • No Known Problems Father    • No Known Problems Mother    • No Known Problems Sister    • Breast cancer Neg Hx    • Ovarian cancer Neg Hx    • Colon cancer Neg Hx    • Colon polyps Neg Hx        Review of Systems   Constitutional: Negative for activity change, appetite change, chills, diaphoresis, fatigue, fever and unexpected weight change.   HENT: Negative for ear pain, hearing loss, mouth sores, sore throat, trouble swallowing and voice change.    Eyes: Negative.    Respiratory: Negative for cough, choking, shortness of breath and wheezing.    Cardiovascular: Negative for chest pain and palpitations.   Gastrointestinal: Negative for abdominal pain, blood in stool, constipation, diarrhea, nausea and vomiting.   Endocrine: Negative for cold intolerance and heat intolerance.   Genitourinary: Negative for decreased urine volume, dysuria, frequency, hematuria and urgency.   Musculoskeletal: Negative for back pain, gait problem and myalgias.   Skin: Negative for color change, pallor and rash.   Allergic/Immunologic: Negative for food allergies and immunocompromised state.   Neurological: Negative for dizziness, tremors, seizures, syncope, weakness, light-headedness, numbness and headaches.   Hematological: Negative for adenopathy. Does not bruise/bleed easily.   Psychiatric/Behavioral: Negative for agitation and confusion. The patient is not nervous/anxious.    All other systems reviewed and are negative.      /72   Pulse 93   Ht 154.9 cm (61\")   Wt 60.3 kg (133 lb)   SpO2 99%   Breastfeeding? No   BMI 25.13 kg/m²   Body mass index is 25.13 kg/m².    Physical Exam   Constitutional: She is oriented to person, place, and time. She appears well-developed and well-nourished.   HENT:   Head: Normocephalic and atraumatic.   Eyes: Pupils are equal, round, and reactive to light.   Neck: Normal range of motion. Neck supple. No tracheal " deviation present.   Cardiovascular: Normal rate, regular rhythm and normal heart sounds. Exam reveals no gallop and no friction rub.   No murmur heard.  Pulmonary/Chest: Effort normal and breath sounds normal. No respiratory distress. She has no wheezes. She has no rales. She exhibits no tenderness.   Abdominal: Soft. Bowel sounds are normal. She exhibits no distension. There is no hepatosplenomegaly. There is no tenderness. There is no rigidity, no rebound and no guarding.   Musculoskeletal: Normal range of motion. She exhibits no edema, tenderness or deformity.   Neurological: She is alert and oriented to person, place, and time. She has normal reflexes.   Skin: Skin is warm and dry. No rash noted. No pallor.   Psychiatric: She has a normal mood and affect. Her behavior is normal. Judgment and thought content normal.       ASSESSMENT AND PLAN    Patient's Body mass index is 25.13 kg/m². BMI is within normal parameters. No follow-up required..    Wendy was seen today for gi problem.    Diagnoses and all orders for this visit:    Dyspepsia    Anxiety      Improved, to call us if needed or symptoms return or persist. Happy to see back anytime.     There are no Patient Instructions on file for this visit.  Isadora Miranda, APRN  1:22 PM  2/19/2019    Obesity, Adult  Obesity is the condition of having too much total body fat. Being overweight or obese means that your weight is greater than what is considered healthy for your body size. Obesity is determined by a measurement called BMI. BMI is an estimate of body fat and is calculated from height and weight. For adults, a BMI of 30 or higher is considered obese.  Obesity can eventually lead to other health concerns and major illnesses, including:  · Stroke.  · Coronary artery disease (CAD).  · Type 2 diabetes.  · Some types of cancer, including cancers of the colon, breast, uterus, and gallbladder.  · Osteoarthritis.  · High blood pressure (hypertension).  · High  cholesterol.  · Sleep apnea.  · Gallbladder stones.  · Infertility problems.  What are the causes?  The main cause of obesity is taking in (consuming) more calories than your body uses for energy. Other factors that contribute to this condition may include:  · Being born with genes that make you more likely to become obese.  · Having a medical condition that causes obesity. These conditions include:  ¨ Hypothyroidism.  ¨ Polycystic ovarian syndrome (PCOS).  ¨ Binge-eating disorder.  ¨ Cushing syndrome.  · Taking certain medicines, such as steroids, antidepressants, and seizure medicines.  · Not being physically active (sedentary lifestyle).  · Living where there are limited places to exercise safely or buy healthy foods.  · Not getting enough sleep.  What increases the risk?  The following factors may increase your risk of this condition:  · Having a family history of obesity.  · Being a woman of -American descent.  · Being a man of  descent.  What are the signs or symptoms?  Having excessive body fat is the main symptom of this condition.  How is this diagnosed?  This condition may be diagnosed based on:  · Your symptoms.  · Your medical history.  · A physical exam. Your health care provider may measure:  ¨ Your BMI. If you are an adult with a BMI between 25 and less than 30, you are considered overweight. If you are an adult with a BMI of 30 or higher, you are considered obese.  ¨ The distances around your hips and your waist (circumferences). These may be compared to each other to help diagnose your condition.  ¨ Your skinfold thickness. Your health care provider may gently pinch a fold of your skin and measure it.  How is this treated?  Treatment for this condition often includes changing your lifestyle. Treatment may include some or all of the following:  · Dietary changes. Work with your health care provider and a dietitian to set a weight-loss goal that is healthy and reasonable for you.  Dietary changes may include eating:  ¨ Smaller portions. A portion size is the amount of a particular food that is healthy for you to eat at one time. This varies from person to person.  ¨ Low-calorie or low-fat options.  ¨ More whole grains, fruits, and vegetables.  · Regular physical activity. This may include aerobic activity (cardio) and strength training.  · Medicine to help you lose weight. Your health care provider may prescribe medicine if you are unable to lose 1 pound a week after 6 weeks of eating more healthily and doing more physical activity.  · Surgery. Surgical options may include gastric banding and gastric bypass. Surgery may be done if:  ¨ Other treatments have not helped to improve your condition.  ¨ You have a BMI of 40 or higher.  ¨ You have life-threatening health problems related to obesity.  Follow these instructions at home:     Eating and drinking     · Follow recommendations from your health care provider about what you eat and drink. Your health care provider may advise you to:  ¨ Limit fast foods, sweets, and processed snack foods.  ¨ Choose low-fat options, such as low-fat milk instead of whole milk.  ¨ Eat 5 or more servings of fruits or vegetables every day.  ¨ Eat at home more often. This gives you more control over what you eat.  ¨ Choose healthy foods when you eat out.  ¨ Learn what a healthy portion size is.  ¨ Keep low-fat snacks on hand.  ¨ Avoid sugary drinks, such as soda, fruit juice, iced tea sweetened with sugar, and flavored milk.  ¨ Eat a healthy breakfast.  · Drink enough water to keep your urine clear or pale yellow.  · Do not go without eating for long periods of time (do not fast) or follow a fad diet. Fasting and fad diets can be unhealthy and even dangerous.  Physical Activity   · Exercise regularly, as told by your health care provider. Ask your health care provider what types of exercise are safe for you and how often you should exercise.  · Warm up and stretch  before being active.  · Cool down and stretch after being active.  · Rest between periods of activity.  Lifestyle   · Limit the time that you spend in front of your TV, computer, or video game system.  · Find ways to reward yourself that do not involve food.  · Limit alcohol intake to no more than 1 drink a day for nonpregnant women and 2 drinks a day for men. One drink equals 12 oz of beer, 5 oz of wine, or 1½ oz of hard liquor.  General instructions   · Keep a weight loss journal to keep track of the food you eat and how much you exercise you get.  · Take over-the-counter and prescription medicines only as told by your health care provider.  · Take vitamins and supplements only as told by your health care provider.  · Consider joining a support group. Your health care provider may be able to recommend a support group.  · Keep all follow-up visits as told by your health care provider. This is important.  Contact a health care provider if:  · You are unable to meet your weight loss goal after 6 weeks of dietary and lifestyle changes.  This information is not intended to replace advice given to you by your health care provider. Make sure you discuss any questions you have with your health care provider.  Document Released: 01/25/2006 Document Revised: 05/22/2017 Document Reviewed: 10/05/2016  [employer] Interactive Patient Education © 2017 [employer]      IF YOU SMOKE OR USE TOBACCO PLEASE READ THE FOLLOWING:    Why is smoking bad for me?  Smoking increases the risk of heart disease, lung disease, vascular disease, stroke, and cancer.     If you smoke, STOP!    If you would like more information on quitting smoking, please visit the [employer] website: [email or web address]   This link will provide additional resources including the QUIT line and the [employer] support groups.     For more information:    Quit Now  [place]  1-800-QUIT-NOW  https://[place].quitlogix.org/en-[place]/

## 2019-03-05 RX ORDER — ESCITALOPRAM OXALATE 10 MG/1
TABLET ORAL
Qty: 30 TABLET | Refills: 2 | Status: SHIPPED | OUTPATIENT
Start: 2019-03-05 | End: 2019-06-08 | Stop reason: SDUPTHER

## 2019-03-22 ENCOUNTER — OFFICE VISIT (OUTPATIENT)
Dept: OBSTETRICS AND GYNECOLOGY | Facility: CLINIC | Age: 19
End: 2019-03-22

## 2019-03-22 VITALS
WEIGHT: 135 LBS | BODY MASS INDEX: 25.49 KG/M2 | HEIGHT: 61 IN | DIASTOLIC BLOOD PRESSURE: 70 MMHG | SYSTOLIC BLOOD PRESSURE: 116 MMHG

## 2019-03-22 DIAGNOSIS — E28.2 PCOS (POLYCYSTIC OVARIAN SYNDROME): ICD-10-CM

## 2019-03-22 DIAGNOSIS — Z12.4 SCREENING FOR CERVICAL CANCER: ICD-10-CM

## 2019-03-22 DIAGNOSIS — Z01.419 ENCOUNTER FOR GYNECOLOGICAL EXAMINATION WITHOUT ABNORMAL FINDING: Primary | ICD-10-CM

## 2019-03-22 PROCEDURE — 99395 PREV VISIT EST AGE 18-39: CPT | Performed by: OBSTETRICS & GYNECOLOGY

## 2019-03-22 PROCEDURE — 87624 HPV HI-RISK TYP POOLED RSLT: CPT | Performed by: OBSTETRICS & GYNECOLOGY

## 2019-03-22 PROCEDURE — G0123 SCREEN CERV/VAG THIN LAYER: HCPCS | Performed by: OBSTETRICS & GYNECOLOGY

## 2019-03-22 RX ORDER — FLUTICASONE PROPIONATE 50 MCG
2 SPRAY, SUSPENSION (ML) NASAL DAILY
COMMUNITY
End: 2021-03-25

## 2019-03-22 RX ORDER — INFLUENZA VIRUS VACCINE 15; 15; 15; 15 UG/.5ML; UG/.5ML; UG/.5ML; UG/.5ML
SUSPENSION INTRAMUSCULAR
Refills: 0 | COMMUNITY
Start: 2019-03-15 | End: 2019-07-16

## 2019-03-22 RX ORDER — DROSPIRENONE AND ETHINYL ESTRADIOL 0.02-3(28)
1 KIT ORAL DAILY
Qty: 28 TABLET | Refills: 12 | Status: SHIPPED | OUTPATIENT
Start: 2019-03-22 | End: 2019-07-16 | Stop reason: ALTCHOICE

## 2019-03-22 NOTE — PROGRESS NOTES
Attempted to obtain health maintenance information, patient unable to provide answers for the following items Tdap, HPV Vaccine and meningitis vaccine and annual exam.

## 2019-03-22 NOTE — PROGRESS NOTES
Subjective   Wendy Newby is a 19 y.o. female  YOB: 2000    Chief Complaint   Patient presents with   • Gynecologic Exam     Here for her first pap smear.   • Polycystic Ovary Syndrome     Discuss labs.        20 yo  Patient's last menstrual period was 2019 (exact date) presents for annual examination. Patient offers no complaints. Patient was previously diagnosed with PCOS and is on Brittney. Patient states she is not sexually active at this time. Reports she has not received Gardasil previously.       No Known Allergies    Past Medical History:   Diagnosis Date   • Allergic     seasonal allergies   • Vertigo     r/t to severe allergy symptoms       Family History   Problem Relation Age of Onset   • Diabetes Paternal Grandmother    • No Known Problems Father    • No Known Problems Mother    • No Known Problems Sister    • Breast cancer Neg Hx    • Ovarian cancer Neg Hx    • Colon cancer Neg Hx    • Colon polyps Neg Hx        Social History     Socioeconomic History   • Marital status: Single     Spouse name: Not on file   • Number of children: Not on file   • Years of education: Not on file   • Highest education level: Not on file   Tobacco Use   • Smoking status: Never Smoker   • Smokeless tobacco: Never Used   Substance and Sexual Activity   • Alcohol use: No   • Drug use: No   • Sexual activity: No     Birth control/protection: OCP         Current Outpatient Medications:   •  cetirizine (zyrTEC) 10 MG tablet, Take 10 mg by mouth Daily As Needed for allergies., Disp: , Rfl:   •  drospirenone-ethinyl estradiol (BRITTNEY,GIANVI) 3-0.02 MG per tablet, Take 1 tablet by mouth Daily., Disp: 28 tablet, Rfl: 12  •  escitalopram (LEXAPRO) 10 MG tablet, TAKE ONE TABLET BY MOUTH DAILY, Disp: 30 tablet, Rfl: 2  •  FLUARIX QUADRIVALENT 0.5 ML suspension prefilled syringe injection, , Disp: , Rfl: 0  •  fluticasone (FLONASE) 50 MCG/ACT nasal spray, 2 sprays into the nostril(s) as directed by provider Daily.,  Disp: , Rfl:     Patient's last menstrual period was 03/16/2019 (exact date).    Sexual History:         Could not be calculated    Past Surgical History:   Procedure Laterality Date   • NO PAST SURGERIES         Review of Systems   Constitutional: Negative for activity change and unexpected weight loss.   HENT: Negative for congestion.    Cardiovascular: Negative for chest pain.   Gastrointestinal: Negative for blood in stool, constipation and diarrhea.   Endocrine: Negative for cold intolerance and heat intolerance.   Genitourinary: Negative for pelvic pain and vaginal discharge.   Musculoskeletal: Negative for arthralgias, back pain, neck pain and neck stiffness.   Skin: Negative for rash.   Neurological: Negative for dizziness and headache.   Psychiatric/Behavioral: Negative for sleep disturbance. The patient is not nervous/anxious.        Objective   Physical Exam   Constitutional: She is oriented to person, place, and time. She appears well-developed and well-nourished. No distress.   HENT:   Head: Normocephalic and atraumatic.   Eyes: EOM are normal.   Neck: Normal range of motion. Neck supple.   Cardiovascular: Normal rate and regular rhythm.   No murmur heard.  Pulmonary/Chest: Effort normal and breath sounds normal.   Abdominal: Soft. She exhibits no distension. There is no tenderness.   Genitourinary: Vagina normal and uterus normal. Pelvic exam was performed with patient supine. There is no tenderness or lesion on the right labia. There is no tenderness or lesion on the left labia. Cervix does not exhibit motion tenderness, discharge or friability. Right adnexum displays no tenderness and no fullness. Left adnexum displays no tenderness and no fullness. No tenderness or bleeding in the vagina. No vaginal discharge found.   Musculoskeletal: Normal range of motion. She exhibits no edema.   Neurological: She is alert and oriented to person, place, and time.   Skin: Skin is warm and dry.   Psychiatric: She  "has a normal mood and affect. Her behavior is normal. Judgment normal.   Nursing note and vitals reviewed.    Vitals:    03/22/19 0958   BP: 116/70   BP Location: Left arm   Patient Position: Sitting   Cuff Size: Adult   Weight: 61.2 kg (135 lb)   Height: 154.9 cm (61\")       Wendy was seen today for gynecologic exam and polycystic ovary syndrome.    Diagnoses and all orders for this visit:    Encounter for gynecological examination without abnormal finding    Screening for cervical cancer    PCOS (polycystic ovarian syndrome)  -     Testosterone    Other orders  -     drospirenone-ethinyl estradiol (BRITTNEY,GIANVI) 3-0.02 MG per tablet; Take 1 tablet by mouth Daily.    -Testosterone ordered with results to follow   -Counseled patient about importance of Gardasil vaccination at this time. Patient declined.   -RTC in 1 year or prn if symptoms worsen.     Leonor Ledesma, DO       "

## 2019-03-23 LAB — TESTOST SERPL-MCNC: 31 NG/DL (ref 8–48)

## 2019-03-27 LAB
GEN CATEG CVX/VAG CYTO-IMP: ABNORMAL
HPV I/H RISK 4 DNA CVX QL PROBE+SIG AMP: NOT DETECTED
LAB AP CASE REPORT: ABNORMAL
LAB AP GYN ADDITIONAL INFORMATION: ABNORMAL
PATH INTERP SPEC-IMP: ABNORMAL
STAT OF ADQ CVX/VAG CYTO-IMP: ABNORMAL

## 2019-04-08 RX ORDER — ETHINYL ESTRADIOL/DROSPIRENONE 0.02-3(28)
TABLET ORAL
Qty: 28 TABLET | Refills: 2 | OUTPATIENT
Start: 2019-04-08

## 2019-04-12 ENCOUNTER — TELEPHONE (OUTPATIENT)
Dept: OBSTETRICS AND GYNECOLOGY | Facility: CLINIC | Age: 19
End: 2019-04-12

## 2019-04-12 NOTE — TELEPHONE ENCOUNTER
PT called and was questioning why her refill was denied. I contacted her pharmacy and they stated that she picked up her script 4 days ago.  Called Pt back and she was just concerned because it was not the name of the script that she was used to getting.  PT was advised that there are many different names for this medication that she did have the correct one.  PT voiced understanding.

## 2019-06-10 RX ORDER — ESCITALOPRAM OXALATE 10 MG/1
TABLET ORAL
Qty: 30 TABLET | Refills: 1 | Status: SHIPPED | OUTPATIENT
Start: 2019-06-10 | End: 2019-08-09 | Stop reason: SDUPTHER

## 2019-07-16 ENCOUNTER — OFFICE VISIT (OUTPATIENT)
Dept: OBSTETRICS AND GYNECOLOGY | Facility: CLINIC | Age: 19
End: 2019-07-16

## 2019-07-16 VITALS
DIASTOLIC BLOOD PRESSURE: 68 MMHG | HEIGHT: 61 IN | BODY MASS INDEX: 26.43 KG/M2 | SYSTOLIC BLOOD PRESSURE: 112 MMHG | WEIGHT: 140 LBS

## 2019-07-16 DIAGNOSIS — Z71.85 HPV VACCINE COUNSELING: ICD-10-CM

## 2019-07-16 DIAGNOSIS — E28.2 PCOS (POLYCYSTIC OVARIAN SYNDROME): Primary | ICD-10-CM

## 2019-07-16 DIAGNOSIS — Z30.41 ENCOUNTER FOR SURVEILLANCE OF CONTRACEPTIVE PILLS: ICD-10-CM

## 2019-07-16 PROCEDURE — 90471 IMMUNIZATION ADMIN: CPT | Performed by: NURSE PRACTITIONER

## 2019-07-16 PROCEDURE — 90649 4VHPV VACCINE 3 DOSE IM: CPT | Performed by: NURSE PRACTITIONER

## 2019-07-16 PROCEDURE — 99213 OFFICE O/P EST LOW 20 MIN: CPT | Performed by: NURSE PRACTITIONER

## 2019-07-17 NOTE — PROGRESS NOTES
Subjective   Wendy Newby is a 19 y.o. female  YOB: 2000      Chief Complaint   Patient presents with   • Polycystic Ovary Syndrome     Patient has previously been dx with PCOS and would like to discuss testing. Patient here today with c/o of painful irregular periods and pain in between periods, as well as pain with ovulation.        Menorrhagia   Pertinent negatives include no abdominal pain, arthralgias, chest pain, chills, congestion, coughing, diaphoresis, fatigue, fever, headaches, joint swelling, myalgias, nausea, neck pain, numbness, rash, sore throat, vomiting or weakness.       The following portions of the patient's history were reviewed and updated as appropriate: allergies, current medications, past family history, past medical history, past social history, past surgical history and problem list.    No Known Allergies    Past Medical History:   Diagnosis Date   • Allergic     seasonal allergies   • PCOS (polycystic ovarian syndrome)    • Vertigo     r/t to severe allergy symptoms       Family History   Problem Relation Age of Onset   • Diabetes Paternal Grandmother    • No Known Problems Father    • No Known Problems Mother    • No Known Problems Sister    • Breast cancer Neg Hx    • Ovarian cancer Neg Hx    • Colon cancer Neg Hx    • Colon polyps Neg Hx        Social History     Socioeconomic History   • Marital status: Single     Spouse name: Not on file   • Number of children: Not on file   • Years of education: Not on file   • Highest education level: Not on file   Tobacco Use   • Smoking status: Never Smoker   • Smokeless tobacco: Never Used   Substance and Sexual Activity   • Alcohol use: No   • Drug use: No   • Sexual activity: No     Birth control/protection: OCP         Current Outpatient Medications:   •  cetirizine (zyrTEC) 10 MG tablet, Take 10 mg by mouth Daily As Needed for allergies., Disp: , Rfl:   •  escitalopram (LEXAPRO) 10 MG tablet, TAKE ONE TABLET BY MOUTH DAILY,  Disp: 30 tablet, Rfl: 1  •  fluticasone (FLONASE) 50 MCG/ACT nasal spray, 2 sprays into the nostril(s) as directed by provider Daily., Disp: , Rfl:   •  Norethin-Eth Estrad-Fe Biphas (LO LOESTRIN FE) 1 MG-10 MCG / 10 MCG tablet, Take 1 tablet by mouth Daily., Disp: 84 tablet, Rfl: 7    Current Facility-Administered Medications:   •  HPV 9-Valent Recomb Vaccine suspension 1 each, 1 each, Intramuscular, Take As Directed, Joceline Lyn, APRN, 1 each at 07/16/19 1205    No LMP recorded. Patient is not currently having periods (Reason: Oral contraceptives).    Sexual History:         Could not be calculated    Past Surgical History:   Procedure Laterality Date   • NO PAST SURGERIES         Review of Systems   Constitutional: Negative for activity change, appetite change, chills, diaphoresis, fatigue, fever and unexpected weight change.   HENT: Negative for congestion, dental problem, drooling, ear discharge, ear pain, facial swelling, hearing loss, mouth sores, nosebleeds, postnasal drip, rhinorrhea, sinus pressure, sinus pain, sneezing, sore throat, tinnitus, trouble swallowing and voice change.    Eyes: Negative for photophobia, pain, discharge, redness, itching and visual disturbance.   Respiratory: Negative for apnea, cough, choking, chest tightness, shortness of breath, wheezing and stridor.    Cardiovascular: Negative for chest pain, palpitations and leg swelling.   Gastrointestinal: Negative for abdominal distention, abdominal pain, anal bleeding, blood in stool, constipation, diarrhea, nausea, rectal pain and vomiting.   Endocrine: Negative for cold intolerance, heat intolerance, polydipsia, polyphagia and polyuria.   Genitourinary: Positive for menorrhagia and menstrual problem. Negative for decreased urine volume, difficulty urinating, dyspareunia, dysuria, enuresis, flank pain, frequency, genital sores, hematuria, pelvic pain, urgency, vaginal bleeding, vaginal discharge and vaginal pain.  "  Musculoskeletal: Negative for arthralgias, back pain, gait problem, joint swelling, myalgias, neck pain and neck stiffness.   Skin: Negative for color change, pallor, rash and wound.   Allergic/Immunologic: Negative for environmental allergies, food allergies and immunocompromised state.   Neurological: Negative for dizziness, tremors, seizures, syncope, facial asymmetry, speech difficulty, weakness, light-headedness, numbness and headaches.   Hematological: Negative for adenopathy. Does not bruise/bleed easily.   Psychiatric/Behavioral: Negative for agitation, behavioral problems, confusion, decreased concentration, dysphoric mood, hallucinations, self-injury, sleep disturbance and suicidal ideas. The patient is not nervous/anxious and is not hyperactive.        Objective   Physical Exam   Constitutional: She is oriented to person, place, and time. She appears well-developed and well-nourished.   HENT:   Head: Normocephalic.   Eyes: Pupils are equal, round, and reactive to light.   Neck: Normal range of motion.   Cardiovascular: Normal rate and regular rhythm.   Pulmonary/Chest: Effort normal and breath sounds normal.   Abdominal: Soft.   Musculoskeletal: Normal range of motion.   Neurological: She is alert and oriented to person, place, and time.   Skin: Skin is warm and dry.   Psychiatric: She has a normal mood and affect. Her behavior is normal.   Nursing note and vitals reviewed.        Vitals:    07/16/19 1107   BP: 112/68   Weight: 63.5 kg (140 lb)   Height: 154.9 cm (61\")       Wendy was seen today for polycystic ovary syndrome.    Diagnoses and all orders for this visit:    PCOS (polycystic ovarian syndrome)  Comments:  Patient reports she is previously been diagnosed with PCOS but states it has been a long time since she had any testing done or took any medicine.  Fasting lab orders entered-patient to come in with fasting to have these done.  We will follow-up pending results.  Orders:  -     Insulin, " Total  -     Testosterone    Encounter for surveillance of contraceptive pills  Comments:  Complaint of painful irregular periods and ovulation pain.  Wants to discuss treatment options.  Discussed options and risks versus benefits.  Patient wants to try low Loestrin.  Sample given and Rx sent to pharmacy.  RTO in 3 months for follow-up or sooner as needed.  Orders:  -     Norethin-Eth Estrad-Fe Biphas (LO LOESTRIN FE) 1 MG-10 MCG / 10 MCG tablet; Take 1 tablet by mouth Daily.    HPV vaccine counseling  Comments:  Patient to get third Gardasil today.  Orders:  -     HPV 9-Valent Recomb Vaccine suspension 1 each            Patient's Body mass index is 26.45 kg/m². BMI is above normal parameters. Recommendations include: exercise counseling and nutrition counseling.             Non-Smoker    MyChart Instructions Given

## 2019-07-24 DIAGNOSIS — R79.89 ELEVATED TESTOSTERONE LEVEL IN FEMALE: Primary | ICD-10-CM

## 2019-07-24 LAB
INSULIN SERPL-ACNC: 9.3 UIU/ML (ref 2.6–24.9)
TESTOST SERPL-MCNC: 46 NG/DL (ref 8–48)

## 2019-07-24 RX ORDER — SPIRONOLACTONE 25 MG/1
25 TABLET ORAL DAILY
Qty: 30 TABLET | Refills: 2 | Status: SHIPPED | OUTPATIENT
Start: 2019-07-24 | End: 2019-08-20

## 2019-08-09 RX ORDER — ESCITALOPRAM OXALATE 10 MG/1
TABLET ORAL
Qty: 30 TABLET | Refills: 2 | Status: SHIPPED | OUTPATIENT
Start: 2019-08-09 | End: 2019-11-16 | Stop reason: SDUPTHER

## 2019-08-13 ENCOUNTER — TELEPHONE (OUTPATIENT)
Dept: OBSTETRICS AND GYNECOLOGY | Facility: CLINIC | Age: 19
End: 2019-08-13

## 2019-08-13 NOTE — TELEPHONE ENCOUNTER
Patient called and left  that she has questions about a script that she was given, patient asked that you call her back at [phone].    Thanks!

## 2019-08-16 NOTE — TELEPHONE ENCOUNTER
Patient stopped birth control due to some issues she felt was stemming from her OCP. She felt is was counteracting with her Lexapro and causing her to have higher anxiety. Patient is doing well without her ocp and desires to stay off of it.

## 2019-08-20 ENCOUNTER — OFFICE VISIT (OUTPATIENT)
Dept: FAMILY MEDICINE CLINIC | Facility: CLINIC | Age: 19
End: 2019-08-20

## 2019-08-20 VITALS
OXYGEN SATURATION: 98 % | DIASTOLIC BLOOD PRESSURE: 78 MMHG | RESPIRATION RATE: 16 BRPM | HEIGHT: 61 IN | HEART RATE: 78 BPM | BODY MASS INDEX: 27.94 KG/M2 | SYSTOLIC BLOOD PRESSURE: 122 MMHG | WEIGHT: 148 LBS

## 2019-08-20 DIAGNOSIS — F41.9 ANXIETY: Primary | ICD-10-CM

## 2019-08-20 PROCEDURE — 99213 OFFICE O/P EST LOW 20 MIN: CPT | Performed by: FAMILY MEDICINE

## 2019-08-20 NOTE — PROGRESS NOTES
"Subjective   Wendy Newby is a 19 y.o. female.     Chief Complaint   Patient presents with   • Follow-up     6 mo   anxiety        History of Present Illness     had  noted having anxiety --taking 10mg lexapro now for sevearl mos      Current Outpatient Medications:   •  cetirizine (zyrTEC) 10 MG tablet, Take 10 mg by mouth Daily As Needed for allergies., Disp: , Rfl:   •  escitalopram (LEXAPRO) 10 MG tablet, TAKE ONE TABLET BY MOUTH DAILY, Disp: 30 tablet, Rfl: 2  •  fluticasone (FLONASE) 50 MCG/ACT nasal spray, 2 sprays into the nostril(s) as directed by provider Daily., Disp: , Rfl:     Current Facility-Administered Medications:   •  HPV 9-Valent Recomb Vaccine suspension 1 each, 1 each, Intramuscular, Take As Directed, Joceline Lyn, CHARLES, 1 each at 07/16/19 1205  No Known Allergies    Past Medical History:   Diagnosis Date   • Allergic     seasonal allergies   • PCOS (polycystic ovarian syndrome)    • Vertigo     r/t to severe allergy symptoms     Past Surgical History:   Procedure Laterality Date   • NO PAST SURGERIES         Review of Systems   Constitutional: Negative.    HENT: Negative.    Eyes: Negative.    Respiratory: Negative.    Cardiovascular: Negative.    Gastrointestinal: Negative.    Endocrine: Negative.    Genitourinary: Negative.    Musculoskeletal: Negative.    Skin: Negative.    Allergic/Immunologic: Negative.    Neurological: Negative.    Hematological: Negative.    Psychiatric/Behavioral: The patient is nervous/anxious.        Objective  /78   Pulse 78   Resp 16   Ht 154.9 cm (61\")   Wt 67.1 kg (148 lb)   SpO2 98%   BMI 27.96 kg/m²   Physical Exam   Constitutional: She is oriented to person, place, and time. She appears well-developed and well-nourished.   HENT:   Head: Normocephalic and atraumatic.   Right Ear: External ear normal.   Left Ear: External ear normal.   Nose: Nose normal.   Mouth/Throat: Oropharynx is clear and moist.   Eyes: Conjunctivae and EOM are normal. " Pupils are equal, round, and reactive to light.   Neck: Normal range of motion. Neck supple.   Cardiovascular: Normal rate, regular rhythm, normal heart sounds and intact distal pulses.   Pulmonary/Chest: Effort normal and breath sounds normal.   Abdominal: Soft. Bowel sounds are normal.   Musculoskeletal: Normal range of motion.   Neurological: She is alert and oriented to person, place, and time.   Skin: Skin is warm. Capillary refill takes less than 2 seconds.   Psychiatric: She has a normal mood and affect. Her behavior is normal. Judgment and thought content normal.   Nursing note and vitals reviewed.      Assessment/Plan   Wendy was seen today for follow-up.    Diagnoses and all orders for this visit:    Anxiety      im glad she is feeling better         No orders of the defined types were placed in this encounter.      Follow up: 6 month(s)

## 2019-10-17 ENCOUNTER — OFFICE VISIT (OUTPATIENT)
Dept: OBSTETRICS AND GYNECOLOGY | Facility: CLINIC | Age: 19
End: 2019-10-17

## 2019-10-17 VITALS
SYSTOLIC BLOOD PRESSURE: 118 MMHG | WEIGHT: 155 LBS | DIASTOLIC BLOOD PRESSURE: 72 MMHG | HEIGHT: 61 IN | BODY MASS INDEX: 29.27 KG/M2

## 2019-10-17 DIAGNOSIS — E28.2 PCOS (POLYCYSTIC OVARIAN SYNDROME): Primary | ICD-10-CM

## 2019-10-17 DIAGNOSIS — Z71.85 HPV VACCINE COUNSELING: ICD-10-CM

## 2019-10-17 PROCEDURE — 90649 4VHPV VACCINE 3 DOSE IM: CPT | Performed by: NURSE PRACTITIONER

## 2019-10-17 PROCEDURE — 90471 IMMUNIZATION ADMIN: CPT | Performed by: NURSE PRACTITIONER

## 2019-10-17 PROCEDURE — 99213 OFFICE O/P EST LOW 20 MIN: CPT | Performed by: NURSE PRACTITIONER

## 2019-10-17 NOTE — PROGRESS NOTES
Subjective   Wendy Newby is a 19 y.o. female  YOB: 2000      Chief Complaint   Patient presents with   • Polycystic Ovary Syndrome     Patient here for 3 month follow up on PCOS. Patient is not fasting today but states she can come back tomorrow to have labs done if we put orders in today.        Patient here for follow-up and fasting labs related to her previous PCOS diagnosis.        The following portions of the patient's history were reviewed and updated as appropriate: allergies, current medications, past family history, past medical history, past social history, past surgical history and problem list.    No Known Allergies    Past Medical History:   Diagnosis Date   • Allergic     seasonal allergies   • PCOS (polycystic ovarian syndrome)    • PCOS (polycystic ovarian syndrome)    • Vertigo     r/t to severe allergy symptoms       Family History   Problem Relation Age of Onset   • Diabetes Paternal Grandmother    • No Known Problems Father    • No Known Problems Mother    • No Known Problems Sister    • Breast cancer Neg Hx    • Ovarian cancer Neg Hx    • Colon cancer Neg Hx    • Colon polyps Neg Hx        Social History     Socioeconomic History   • Marital status: Single     Spouse name: Not on file   • Number of children: Not on file   • Years of education: Not on file   • Highest education level: Not on file   Tobacco Use   • Smoking status: Never Smoker   • Smokeless tobacco: Never Used   Substance and Sexual Activity   • Alcohol use: No   • Drug use: No   • Sexual activity: No     Birth control/protection: OCP         Current Outpatient Medications:   •  cetirizine (zyrTEC) 10 MG tablet, Take 10 mg by mouth Daily As Needed for allergies., Disp: , Rfl:   •  escitalopram (LEXAPRO) 10 MG tablet, TAKE ONE TABLET BY MOUTH DAILY, Disp: 30 tablet, Rfl: 2  •  fluticasone (FLONASE) 50 MCG/ACT nasal spray, 2 sprays into the nostril(s) as directed by provider Daily., Disp: , Rfl:     Current  Facility-Administered Medications:   •  HPV 9-Valent Recomb Vaccine suspension 1 each, 1 each, Intramuscular, Take As Directed, Joceline Lyn, APRN, 1 each at 07/16/19 1205  •  HPV 9-Valent Recomb Vaccine suspension 1 syringe, 1 syringe, Intramuscular, Take As Directed, Joceline Lyn, APRN    No LMP recorded. Patient is not currently having periods (Reason: Oral contraceptives).    Sexual History:         Could not be calculated    Past Surgical History:   Procedure Laterality Date   • NO PAST SURGERIES         Review of Systems   Constitutional: Negative for activity change, appetite change, chills, diaphoresis, fatigue, fever and unexpected weight change.   HENT: Negative for congestion, dental problem, drooling, ear discharge, ear pain, facial swelling, hearing loss, mouth sores, nosebleeds, postnasal drip, rhinorrhea, sinus pressure, sinus pain, sneezing, sore throat, tinnitus, trouble swallowing and voice change.    Eyes: Negative for photophobia, pain, discharge, redness, itching and visual disturbance.   Respiratory: Negative for apnea, cough, choking, chest tightness, shortness of breath, wheezing and stridor.    Cardiovascular: Negative for chest pain, palpitations and leg swelling.   Gastrointestinal: Negative for abdominal distention, abdominal pain, anal bleeding, blood in stool, constipation, diarrhea, nausea, rectal pain and vomiting.   Endocrine: Negative for cold intolerance, heat intolerance, polydipsia, polyphagia and polyuria.   Genitourinary: Negative for decreased urine volume, difficulty urinating, dyspareunia, dysuria, enuresis, flank pain, frequency, genital sores, hematuria, menstrual problem, pelvic pain, urgency, vaginal bleeding, vaginal discharge and vaginal pain.   Musculoskeletal: Negative for arthralgias, back pain, gait problem, joint swelling, myalgias, neck pain and neck stiffness.   Skin: Negative for color change, pallor, rash and wound.   Allergic/Immunologic:  "Negative for environmental allergies, food allergies and immunocompromised state.   Neurological: Negative for dizziness, tremors, seizures, syncope, facial asymmetry, speech difficulty, weakness, light-headedness, numbness and headaches.   Hematological: Negative for adenopathy. Does not bruise/bleed easily.   Psychiatric/Behavioral: Negative for agitation, behavioral problems, confusion, decreased concentration, dysphoric mood, hallucinations, self-injury, sleep disturbance and suicidal ideas. The patient is not nervous/anxious and is not hyperactive.        Objective   Physical Exam   Constitutional: She is oriented to person, place, and time. She appears well-developed and well-nourished.   HENT:   Head: Normocephalic.   Eyes: Pupils are equal, round, and reactive to light.   Neck: Normal range of motion.   Cardiovascular: Normal rate and regular rhythm.   Pulmonary/Chest: Effort normal and breath sounds normal.   Abdominal: Soft.   Musculoskeletal: Normal range of motion.   Neurological: She is alert and oriented to person, place, and time.   Skin: Skin is warm and dry.   Psychiatric: She has a normal mood and affect. Her behavior is normal.   Nursing note and vitals reviewed.        Vitals:    10/17/19 1033   BP: 118/72   Weight: 70.3 kg (155 lb)   Height: 154.9 cm (61\")       Wendy was seen today for polycystic ovary syndrome.    Diagnoses and all orders for this visit:    PCOS (polycystic ovarian syndrome)  Comments:  Here for 3-month follow-up of PCOS.  Is not fasting today but is going to come back in for fasting labs tomorrow.  We will follow-up pending results.  Orders:  -     Testosterone  -     Insulin, Total    HPV vaccine counseling  Comments:  Second Gardasil vaccine today.  Orders:  -     HPV 9-Valent Recomb Vaccine suspension 1 syringe          Patient's Body mass index is 29.29 kg/m². BMI is above normal parameters. Recommendations include: exercise counseling and nutrition counseling.           "   Non-Smoker    MyChart Instructions Given

## 2019-10-18 ENCOUNTER — OFFICE VISIT (OUTPATIENT)
Dept: GASTROENTEROLOGY | Facility: CLINIC | Age: 19
End: 2019-10-18

## 2019-10-18 VITALS
WEIGHT: 133 LBS | SYSTOLIC BLOOD PRESSURE: 128 MMHG | DIASTOLIC BLOOD PRESSURE: 80 MMHG | BODY MASS INDEX: 25.11 KG/M2 | HEART RATE: 83 BPM | OXYGEN SATURATION: 98 % | HEIGHT: 61 IN

## 2019-10-18 DIAGNOSIS — R10.13 DYSPEPSIA: Primary | ICD-10-CM

## 2019-10-18 PROCEDURE — 99213 OFFICE O/P EST LOW 20 MIN: CPT | Performed by: CLINICAL NURSE SPECIALIST

## 2019-10-18 RX ORDER — ALPHA-D-GALACTOSIDASE 600 UNIT
CAPSULE ORAL
COMMUNITY
End: 2021-03-25

## 2019-10-18 RX ORDER — OMEPRAZOLE 20 MG/1
20 CAPSULE, DELAYED RELEASE ORAL DAILY
Qty: 30 CAPSULE | Refills: 11 | Status: SHIPPED | OUTPATIENT
Start: 2019-10-18 | End: 2020-11-16

## 2019-10-18 NOTE — PROGRESS NOTES
Wendy Newby  2000      10/18/2019  Chief Complaint   Patient presents with   • GI Problem     Still having alot of belching         HPI    Wendy Newby is a  19 y.o. female here for a follow up visit for dyspepsia ongoing recurrent for her over the past few months. It is persistent ongoing at least 4-5 days per day. Episodes last for hours. No certain triggers. She has a lot of burping belching and regurgitation. No dysphagia. No straws or much carbonation. Not a lot of dairy.     Past Medical History:   Diagnosis Date   • Allergic     seasonal allergies   • PCOS (polycystic ovarian syndrome)    • PCOS (polycystic ovarian syndrome)    • Vertigo     r/t to severe allergy symptoms     Past Surgical History:   Procedure Laterality Date   • NO PAST SURGERIES         Outpatient Medications Marked as Taking for the 10/18/19 encounter (Office Visit) with Isadora Miranda APRN   Medication Sig Dispense Refill   • Alpha-D-Galactosidase (GAS-X PREVENTION) capsule Take  by mouth. Prn     • cetirizine (zyrTEC) 10 MG tablet Take 10 mg by mouth Daily As Needed for allergies.     • escitalopram (LEXAPRO) 10 MG tablet TAKE ONE TABLET BY MOUTH DAILY 30 tablet 2   • fluticasone (FLONASE) 50 MCG/ACT nasal spray 2 sprays into the nostril(s) as directed by provider Daily.       Current Facility-Administered Medications for the 10/18/19 encounter (Office Visit) with Isadora Miranda APRN   Medication Dose Route Frequency Provider Last Rate Last Dose   • HPV 9-Valent Recomb Vaccine suspension 1 each  1 each Intramuscular Take As Directed Joceline Lyn APRN   1 each at 07/16/19 1205   • HPV 9-Valent Recomb Vaccine suspension 1 syringe  1 syringe Intramuscular Take As Directed Joceline Lyn APRN   1 syringe at 10/17/19 1052       No Known Allergies    Social History     Socioeconomic History   • Marital status: Single     Spouse name: Not on file   • Number of children: Not on file   • Years of education: Not  "on file   • Highest education level: Not on file   Tobacco Use   • Smoking status: Never Smoker   • Smokeless tobacco: Never Used   Substance and Sexual Activity   • Alcohol use: No   • Drug use: No   • Sexual activity: No     Birth control/protection: OCP       Family History   Problem Relation Age of Onset   • Diabetes Paternal Grandmother    • No Known Problems Father    • No Known Problems Mother    • No Known Problems Sister    • Breast cancer Neg Hx    • Ovarian cancer Neg Hx    • Colon cancer Neg Hx    • Colon polyps Neg Hx        Review of Systems   Constitutional: Negative for activity change, appetite change, chills, diaphoresis, fatigue, fever and unexpected weight change.   HENT: Negative for ear pain, hearing loss, mouth sores, sore throat, trouble swallowing and voice change.    Eyes: Negative.    Respiratory: Negative for cough, choking, shortness of breath and wheezing.    Cardiovascular: Negative for chest pain and palpitations.   Gastrointestinal: Negative for abdominal pain, blood in stool, constipation, diarrhea, nausea and vomiting.        Dyspepsia   Endocrine: Negative for cold intolerance and heat intolerance.   Genitourinary: Negative for decreased urine volume, dysuria, frequency, hematuria and urgency.   Musculoskeletal: Negative for back pain, gait problem and myalgias.   Skin: Negative for color change, pallor and rash.   Allergic/Immunologic: Negative for food allergies and immunocompromised state.   Neurological: Negative for dizziness, tremors, seizures, syncope, weakness, light-headedness, numbness and headaches.   Hematological: Negative for adenopathy. Does not bruise/bleed easily.   Psychiatric/Behavioral: Negative for agitation and confusion. The patient is not nervous/anxious.    All other systems reviewed and are negative.      /80   Pulse 83   Ht 154.9 cm (61\")   Wt 60.3 kg (133 lb)   SpO2 98%   Breastfeeding? No   BMI 25.13 kg/m²   Body mass index is 25.13 " kg/m².    Physical Exam   Constitutional: She is oriented to person, place, and time. She appears well-developed and well-nourished.   HENT:   Head: Normocephalic and atraumatic.   Eyes: Pupils are equal, round, and reactive to light.   Neck: Normal range of motion. Neck supple. No tracheal deviation present.   Cardiovascular: Normal rate, regular rhythm and normal heart sounds. Exam reveals no gallop and no friction rub.   No murmur heard.  Pulmonary/Chest: Effort normal and breath sounds normal. No respiratory distress. She has no wheezes. She has no rales. She exhibits no tenderness.   Abdominal: Soft. Bowel sounds are normal. She exhibits no distension. There is no hepatosplenomegaly. There is no tenderness. There is no rigidity, no rebound and no guarding.   Musculoskeletal: Normal range of motion. She exhibits no edema, tenderness or deformity.   Neurological: She is alert and oriented to person, place, and time. She has normal reflexes.   Skin: Skin is warm and dry. No rash noted. No pallor.   Psychiatric: She has a normal mood and affect. Her behavior is normal. Judgment and thought content normal.       ASSESSMENT AND PLAN    Patient's Body mass index is 25.13 kg/m². BMI is within normal parameters. No follow-up required..    Wendy was seen today for gi problem.    Diagnoses and all orders for this visit:    Dyspepsia  -     FL Esophagram Complete; Future  -     omeprazole (priLOSEC) 20 MG capsule; Take 1 capsule by mouth Daily.    will start with the most conservative workup for her given her age and mild to moderate symptoms. Will see how she does on the Prilosec. Esophagram to further evaluate.     I discussed non pharmaceutical treatment of gerd.  This includes gradually losing weight to achieve ideal body wt., elevation of the head of bed by 4-6 inches, nothing to eat or drink 3 hours prior to lying down, avoiding tight clothing, stress reduction, tobacco cessation, reduction of alcohol intake, and  dietary restrictions (avoiding caffeine, coffee, fatty foods, mints, chocolate, spicy foods and tomato based sauces as much as possible).      Isadora Miranda, APRN  9:21 AM  10/18/2019    Obesity, Adult  Obesity is the condition of having too much total body fat. Being overweight or obese means that your weight is greater than what is considered healthy for your body size. Obesity is determined by a measurement called BMI. BMI is an estimate of body fat and is calculated from height and weight. For adults, a BMI of 30 or higher is considered obese.  Obesity can eventually lead to other health concerns and major illnesses, including:  · Stroke.  · Coronary artery disease (CAD).  · Type 2 diabetes.  · Some types of cancer, including cancers of the colon, breast, uterus, and gallbladder.  · Osteoarthritis.  · High blood pressure (hypertension).  · High cholesterol.  · Sleep apnea.  · Gallbladder stones.  · Infertility problems.  What are the causes?  The main cause of obesity is taking in (consuming) more calories than your body uses for energy. Other factors that contribute to this condition may include:  · Being born with genes that make you more likely to become obese.  · Having a medical condition that causes obesity. These conditions include:  ¨ Hypothyroidism.  ¨ Polycystic ovarian syndrome (PCOS).  ¨ Binge-eating disorder.  ¨ Cushing syndrome.  · Taking certain medicines, such as steroids, antidepressants, and seizure medicines.  · Not being physically active (sedentary lifestyle).  · Living where there are limited places to exercise safely or buy healthy foods.  · Not getting enough sleep.  What increases the risk?  The following factors may increase your risk of this condition:  · Having a family history of obesity.  · Being a woman of -American descent.  · Being a man of  descent.  What are the signs or symptoms?  Having excessive body fat is the main symptom of this condition.  How is  this diagnosed?  This condition may be diagnosed based on:  · Your symptoms.  · Your medical history.  · A physical exam. Your health care provider may measure:  ¨ Your BMI. If you are an adult with a BMI between 25 and less than 30, you are considered overweight. If you are an adult with a BMI of 30 or higher, you are considered obese.  ¨ The distances around your hips and your waist (circumferences). These may be compared to each other to help diagnose your condition.  ¨ Your skinfold thickness. Your health care provider may gently pinch a fold of your skin and measure it.  How is this treated?  Treatment for this condition often includes changing your lifestyle. Treatment may include some or all of the following:  · Dietary changes. Work with your health care provider and a dietitian to set a weight-loss goal that is healthy and reasonable for you. Dietary changes may include eating:  ¨ Smaller portions. A portion size is the amount of a particular food that is healthy for you to eat at one time. This varies from person to person.  ¨ Low-calorie or low-fat options.  ¨ More whole grains, fruits, and vegetables.  · Regular physical activity. This may include aerobic activity (cardio) and strength training.  · Medicine to help you lose weight. Your health care provider may prescribe medicine if you are unable to lose 1 pound a week after 6 weeks of eating more healthily and doing more physical activity.  · Surgery. Surgical options may include gastric banding and gastric bypass. Surgery may be done if:  ¨ Other treatments have not helped to improve your condition.  ¨ You have a BMI of 40 or higher.  ¨ You have life-threatening health problems related to obesity.  Follow these instructions at home:     Eating and drinking     · Follow recommendations from your health care provider about what you eat and drink. Your health care provider may advise you to:  ¨ Limit fast foods, sweets, and processed snack  foods.  ¨ Choose low-fat options, such as low-fat milk instead of whole milk.  ¨ Eat 5 or more servings of fruits or vegetables every day.  ¨ Eat at home more often. This gives you more control over what you eat.  ¨ Choose healthy foods when you eat out.  ¨ Learn what a healthy portion size is.  ¨ Keep low-fat snacks on hand.  ¨ Avoid sugary drinks, such as soda, fruit juice, iced tea sweetened with sugar, and flavored milk.  ¨ Eat a healthy breakfast.  · Drink enough water to keep your urine clear or pale yellow.  · Do not go without eating for long periods of time (do not fast) or follow a fad diet. Fasting and fad diets can be unhealthy and even dangerous.  Physical Activity   · Exercise regularly, as told by your health care provider. Ask your health care provider what types of exercise are safe for you and how often you should exercise.  · Warm up and stretch before being active.  · Cool down and stretch after being active.  · Rest between periods of activity.  Lifestyle   · Limit the time that you spend in front of your TV, computer, or video game system.  · Find ways to reward yourself that do not involve food.  · Limit alcohol intake to no more than 1 drink a day for nonpregnant women and 2 drinks a day for men. One drink equals 12 oz of beer, 5 oz of wine, or 1½ oz of hard liquor.  General instructions   · Keep a weight loss journal to keep track of the food you eat and how much you exercise you get.  · Take over-the-counter and prescription medicines only as told by your health care provider.  · Take vitamins and supplements only as told by your health care provider.  · Consider joining a support group. Your health care provider may be able to recommend a support group.  · Keep all follow-up visits as told by your health care provider. This is important.  Contact a health care provider if:  · You are unable to meet your weight loss goal after 6 weeks of dietary and lifestyle changes.  This information is  not intended to replace advice given to you by your health care provider. Make sure you discuss any questions you have with your health care provider.  Document Released: 01/25/2006 Document Revised: 05/22/2017 Document Reviewed: 10/05/2016  [employer] Patient Education © 2017 [employer]      IF YOU SMOKE OR USE TOBACCO PLEASE READ THE FOLLOWING:    Why is smoking bad for me?  Smoking increases the risk of heart disease, lung disease, vascular disease, stroke, and cancer.     If you smoke, STOP!    If you would like more information on quitting smoking, please visit the [employer] website: www.[email or web address]/healthier-[email or web address]   This link will provide additional resources including the QUIT line and the [employer] support groups.     For more information:    Quit Now [facility]  1-800-QUIT-NOW  https://[place].quitlogix.org/en-[place]/

## 2019-10-19 LAB
INSULIN SERPL-ACNC: 9.8 UIU/ML (ref 2.6–24.9)
TESTOST SERPL-MCNC: 48 NG/DL (ref 8–48)

## 2019-10-22 ENCOUNTER — HOSPITAL ENCOUNTER (OUTPATIENT)
Dept: GENERAL RADIOLOGY | Facility: HOSPITAL | Age: 19
Discharge: HOME | End: 2019-10-22
Admitting: CLINICAL NURSE SPECIALIST

## 2019-10-22 DIAGNOSIS — R10.13 DYSPEPSIA: ICD-10-CM

## 2019-10-22 PROCEDURE — 74220 X-RAY XM ESOPHAGUS 1CNTRST: CPT

## 2019-10-22 RX ADMIN — BARIUM SULFATE 120 ML: 980 POWDER, FOR SUSPENSION ORAL at 08:38

## 2019-10-22 RX ADMIN — BARIUM SULFATE 700 MG: 700 TABLET ORAL at 08:38

## 2019-10-22 RX ADMIN — BARIUM SULFATE 120 ML: 960 POWDER, FOR SUSPENSION ORAL at 08:38

## 2019-10-22 RX ADMIN — ANTACID/ANTIFLATULENT 1 TABLET: 380; 550; 10; 10 GRANULE, EFFERVESCENT ORAL at 08:38

## 2019-10-24 ENCOUNTER — APPOINTMENT (OUTPATIENT)
Dept: GENERAL RADIOLOGY | Facility: HOSPITAL | Age: 19
End: 2019-10-24

## 2019-11-04 ENCOUNTER — OFFICE VISIT (OUTPATIENT)
Dept: GASTROENTEROLOGY | Facility: CLINIC | Age: 19
End: 2019-11-04

## 2019-11-04 VITALS
HEIGHT: 61 IN | OXYGEN SATURATION: 98 % | DIASTOLIC BLOOD PRESSURE: 70 MMHG | WEIGHT: 134 LBS | HEART RATE: 73 BPM | SYSTOLIC BLOOD PRESSURE: 128 MMHG | BODY MASS INDEX: 25.3 KG/M2

## 2019-11-04 DIAGNOSIS — E66.9 OBESITY, UNSPECIFIED OBESITY SEVERITY, UNSPECIFIED OBESITY TYPE: ICD-10-CM

## 2019-11-04 DIAGNOSIS — R93.3 ABNORMAL ESOPHAGRAM: Primary | ICD-10-CM

## 2019-11-04 DIAGNOSIS — R13.19 ESOPHAGEAL DYSPHAGIA: ICD-10-CM

## 2019-11-04 DIAGNOSIS — Z78.9 NONSMOKER: ICD-10-CM

## 2019-11-04 PROCEDURE — 99214 OFFICE O/P EST MOD 30 MIN: CPT | Performed by: CLINICAL NURSE SPECIALIST

## 2019-11-04 NOTE — PROGRESS NOTES
Wendy Newby  2000      11/4/2019  Chief Complaint   Patient presents with   • GI Problem     Here to discuss dyspepsia         HPI    Wendy Newby is a  19 y.o. female here for a follow up visit for follow up regarding her dyspepsia and ongoing recurrent upper GI symptoms. She had an esophagram 10/22/2019 showing some delay in clearance of esophageal contents with prox escape, small hiatal hernia. No tertiary contractions. No GERD noted. 13 mm barium tablet passed without difficulty. Her symptoms are persistent to the mid to lower esophageal region with some mild dysphagia. No nausea or vomiting. No fever. No wt loss.     Past Medical History:   Diagnosis Date   • Allergic     seasonal allergies   • PCOS (polycystic ovarian syndrome)    • PCOS (polycystic ovarian syndrome)    • Vertigo     r/t to severe allergy symptoms     Past Surgical History:   Procedure Laterality Date   • NO PAST SURGERIES         Outpatient Medications Marked as Taking for the 11/4/19 encounter (Office Visit) with Isadora Miranda APRN   Medication Sig Dispense Refill   • Alpha-D-Galactosidase (GAS-X PREVENTION) capsule Take  by mouth. Prn     • cetirizine (zyrTEC) 10 MG tablet Take 10 mg by mouth Daily As Needed for allergies.     • escitalopram (LEXAPRO) 10 MG tablet TAKE ONE TABLET BY MOUTH DAILY 30 tablet 2   • fluticasone (FLONASE) 50 MCG/ACT nasal spray 2 sprays into the nostril(s) as directed by provider Daily.     • omeprazole (priLOSEC) 20 MG capsule Take 1 capsule by mouth Daily. 30 capsule 11     Current Facility-Administered Medications for the 11/4/19 encounter (Office Visit) with Isadora Miranda APRN   Medication Dose Route Frequency Provider Last Rate Last Dose   • HPV 9-Valent Recomb Vaccine suspension 1 each  1 each Intramuscular Take As Directed Joceline Lyn APRN   1 each at 07/16/19 1205   • HPV 9-Valent Recomb Vaccine suspension 1 syringe  1 syringe Intramuscular Take As Directed Joceline Lyn  [place], APRN   1 syringe at 10/17/19 1052       No Known Allergies    Social History     Socioeconomic History   • Marital status: Single     Spouse name: Not on file   • Number of children: Not on file   • Years of education: Not on file   • Highest education level: Not on file   Tobacco Use   • Smoking status: Never Smoker   • Smokeless tobacco: Never Used   Substance and Sexual Activity   • Alcohol use: No   • Drug use: No   • Sexual activity: No     Birth control/protection: OCP       Family History   Problem Relation Age of Onset   • Diabetes Paternal Grandmother    • No Known Problems Father    • No Known Problems Mother    • No Known Problems Sister    • Breast cancer Neg Hx    • Ovarian cancer Neg Hx    • Colon cancer Neg Hx    • Colon polyps Neg Hx        Review of Systems   Constitutional: Negative for activity change, appetite change, chills, diaphoresis, fatigue, fever and unexpected weight change.   HENT: Negative for ear pain, hearing loss, mouth sores, sore throat, trouble swallowing and voice change.    Eyes: Negative.    Respiratory: Negative for cough, choking, shortness of breath and wheezing.    Cardiovascular: Negative for chest pain and palpitations.   Gastrointestinal: Negative for abdominal pain, blood in stool, constipation, diarrhea, nausea and vomiting.   Endocrine: Negative for cold intolerance and heat intolerance.   Genitourinary: Negative for decreased urine volume, dysuria, frequency, hematuria and urgency.   Musculoskeletal: Negative for back pain, gait problem and myalgias.   Skin: Negative for color change, pallor and rash.   Allergic/Immunologic: Negative for food allergies and immunocompromised state.   Neurological: Negative for dizziness, tremors, seizures, syncope, weakness, light-headedness, numbness and headaches.   Hematological: Negative for adenopathy. Does not bruise/bleed easily.   Psychiatric/Behavioral: Negative for agitation and confusion. The patient is not  "nervous/anxious.    All other systems reviewed and are negative.      /70   Pulse 73   Ht 154.9 cm (61\")   Wt 60.8 kg (134 lb)   SpO2 98%   Breastfeeding? No   BMI 25.32 kg/m²   Body mass index is 25.32 kg/m².    Physical Exam   Constitutional: She is oriented to person, place, and time. She appears well-developed and well-nourished.   HENT:   Head: Normocephalic and atraumatic.   Eyes: Pupils are equal, round, and reactive to light.   Neck: Normal range of motion. Neck supple. No tracheal deviation present.   Cardiovascular: Normal rate, regular rhythm and normal heart sounds. Exam reveals no gallop and no friction rub.   No murmur heard.  Pulmonary/Chest: Effort normal and breath sounds normal. No respiratory distress. She has no wheezes. She has no rales. She exhibits no tenderness.   Abdominal: Soft. Bowel sounds are normal. She exhibits no distension. There is no hepatosplenomegaly. There is no tenderness. There is no rigidity, no rebound and no guarding.   Musculoskeletal: Normal range of motion. She exhibits no edema, tenderness or deformity.   Neurological: She is alert and oriented to person, place, and time. She has normal reflexes.   Skin: Skin is warm and dry. No rash noted. No pallor.   Psychiatric: She has a normal mood and affect. Her behavior is normal. Judgment and thought content normal.       ASSESSMENT AND PLAN    Patient's Body mass index is 25.32 kg/m². BMI is within normal parameters. No follow-up required..    Wendy was seen today for gi problem.    Diagnoses and all orders for this visit:    Abnormal esophagram  -     Case Request; Standing  -     Follow Anesthesia Guidelines / Standing Orders; Future  -     Obtain Informed Consent; Future  -     Implement Anesthesia Orders Day of Procedure; Standing  -     Obtain Informed Consent; Standing  -     Case Request    Esophageal dysphagia    Obesity, unspecified obesity severity, unspecified obesity type    Nonsmoker    cont PPI " therapy and smaller more frequent meals, chew food well.     ESOPHAGOGASTRODUODENOSCOPY WITH ANESTHESIA (N/A)      CHARLES Salgado  12:44 PM  11/4/2019    Obesity, Adult  Obesity is the condition of having too much total body fat. Being overweight or obese means that your weight is greater than what is considered healthy for your body size. Obesity is determined by a measurement called BMI. BMI is an estimate of body fat and is calculated from height and weight. For adults, a BMI of 30 or higher is considered obese.  Obesity can eventually lead to other health concerns and major illnesses, including:  · Stroke.  · Coronary artery disease (CAD).  · Type 2 diabetes.  · Some types of cancer, including cancers of the colon, breast, uterus, and gallbladder.  · Osteoarthritis.  · High blood pressure (hypertension).  · High cholesterol.  · Sleep apnea.  · Gallbladder stones.  · Infertility problems.  What are the causes?  The main cause of obesity is taking in (consuming) more calories than your body uses for energy. Other factors that contribute to this condition may include:  · Being born with genes that make you more likely to become obese.  · Having a medical condition that causes obesity. These conditions include:  ¨ Hypothyroidism.  ¨ Polycystic ovarian syndrome (PCOS).  ¨ Binge-eating disorder.  ¨ Cushing syndrome.  · Taking certain medicines, such as steroids, antidepressants, and seizure medicines.  · Not being physically active (sedentary lifestyle).  · Living where there are limited places to exercise safely or buy healthy foods.  · Not getting enough sleep.  What increases the risk?  The following factors may increase your risk of this condition:  · Having a family history of obesity.  · Being a woman of -American descent.  · Being a man of  descent.  What are the signs or symptoms?  Having excessive body fat is the main symptom of this condition.  How is this diagnosed?  This condition  may be diagnosed based on:  · Your symptoms.  · Your medical history.  · A physical exam. Your health care provider may measure:  ¨ Your BMI. If you are an adult with a BMI between 25 and less than 30, you are considered overweight. If you are an adult with a BMI of 30 or higher, you are considered obese.  ¨ The distances around your hips and your waist (circumferences). These may be compared to each other to help diagnose your condition.  ¨ Your skinfold thickness. Your health care provider may gently pinch a fold of your skin and measure it.  How is this treated?  Treatment for this condition often includes changing your lifestyle. Treatment may include some or all of the following:  · Dietary changes. Work with your health care provider and a dietitian to set a weight-loss goal that is healthy and reasonable for you. Dietary changes may include eating:  ¨ Smaller portions. A portion size is the amount of a particular food that is healthy for you to eat at one time. This varies from person to person.  ¨ Low-calorie or low-fat options.  ¨ More whole grains, fruits, and vegetables.  · Regular physical activity. This may include aerobic activity (cardio) and strength training.  · Medicine to help you lose weight. Your health care provider may prescribe medicine if you are unable to lose 1 pound a week after 6 weeks of eating more healthily and doing more physical activity.  · Surgery. Surgical options may include gastric banding and gastric bypass. Surgery may be done if:  ¨ Other treatments have not helped to improve your condition.  ¨ You have a BMI of 40 or higher.  ¨ You have life-threatening health problems related to obesity.  Follow these instructions at home:     Eating and drinking     · Follow recommendations from your health care provider about what you eat and drink. Your health care provider may advise you to:  ¨ Limit fast foods, sweets, and processed snack foods.  ¨ Choose low-fat options, such as  low-fat milk instead of whole milk.  ¨ Eat 5 or more servings of fruits or vegetables every day.  ¨ Eat at home more often. This gives you more control over what you eat.  ¨ Choose healthy foods when you eat out.  ¨ Learn what a healthy portion size is.  ¨ Keep low-fat snacks on hand.  ¨ Avoid sugary drinks, such as soda, fruit juice, iced tea sweetened with sugar, and flavored milk.  ¨ Eat a healthy breakfast.  · Drink enough water to keep your urine clear or pale yellow.  · Do not go without eating for long periods of time (do not fast) or follow a fad diet. Fasting and fad diets can be unhealthy and even dangerous.  Physical Activity   · Exercise regularly, as told by your health care provider. Ask your health care provider what types of exercise are safe for you and how often you should exercise.  · Warm up and stretch before being active.  · Cool down and stretch after being active.  · Rest between periods of activity.  Lifestyle   · Limit the time that you spend in front of your TV, computer, or video game system.  · Find ways to reward yourself that do not involve food.  · Limit alcohol intake to no more than 1 drink a day for nonpregnant women and 2 drinks a day for men. One drink equals 12 oz of beer, 5 oz of wine, or 1½ oz of hard liquor.  General instructions   · Keep a weight loss journal to keep track of the food you eat and how much you exercise you get.  · Take over-the-counter and prescription medicines only as told by your health care provider.  · Take vitamins and supplements only as told by your health care provider.  · Consider joining a support group. Your health care provider may be able to recommend a support group.  · Keep all follow-up visits as told by your health care provider. This is important.  Contact a health care provider if:  · You are unable to meet your weight loss goal after 6 weeks of dietary and lifestyle changes.  This information is not intended to replace advice given to you  by your health care provider. Make sure you discuss any questions you have with your health care provider.  Document Released: 01/25/2006 Document Revised: 05/22/2017 Document Reviewed: 10/05/2016  [employer] Interactive Patient Education © 2017 [employer]      IF YOU SMOKE OR USE TOBACCO PLEASE READ THE FOLLOWING:    Why is smoking bad for me?  Smoking increases the risk of heart disease, lung disease, vascular disease, stroke, and cancer.     If you smoke, STOP!    If you would like more information on quitting smoking, please visit the [employer] website: [email or web address]   This link will provide additional resources including the QUIT line and the [employer] support groups.     For more information:    Quit Now [facility]  1-800-QUIT-NOW  https://[employer]vay.quitlogix.org/en-[place]/

## 2019-11-04 NOTE — H&P (VIEW-ONLY)
Wendy Newby  2000      11/4/2019  Chief Complaint   Patient presents with   • GI Problem     Here to discuss dyspepsia         HPI    Wendy Newby is a  19 y.o. female here for a follow up visit for follow up regarding her dyspepsia and ongoing recurrent upper GI symptoms. She had an esophagram 10/22/2019 showing some delay in clearance of esophageal contents with prox escape, small hiatal hernia. No tertiary contractions. No GERD noted. 13 mm barium tablet passed without difficulty. Her symptoms are persistent to the mid to lower esophageal region with some mild dysphagia. No nausea or vomiting. No fever. No wt loss.     Past Medical History:   Diagnosis Date   • Allergic     seasonal allergies   • PCOS (polycystic ovarian syndrome)    • PCOS (polycystic ovarian syndrome)    • Vertigo     r/t to severe allergy symptoms     Past Surgical History:   Procedure Laterality Date   • NO PAST SURGERIES         Outpatient Medications Marked as Taking for the 11/4/19 encounter (Office Visit) with Isadora Miranda APRN   Medication Sig Dispense Refill   • Alpha-D-Galactosidase (GAS-X PREVENTION) capsule Take  by mouth. Prn     • cetirizine (zyrTEC) 10 MG tablet Take 10 mg by mouth Daily As Needed for allergies.     • escitalopram (LEXAPRO) 10 MG tablet TAKE ONE TABLET BY MOUTH DAILY 30 tablet 2   • fluticasone (FLONASE) 50 MCG/ACT nasal spray 2 sprays into the nostril(s) as directed by provider Daily.     • omeprazole (priLOSEC) 20 MG capsule Take 1 capsule by mouth Daily. 30 capsule 11     Current Facility-Administered Medications for the 11/4/19 encounter (Office Visit) with Isadora Miranda APRN   Medication Dose Route Frequency Provider Last Rate Last Dose   • HPV 9-Valent Recomb Vaccine suspension 1 each  1 each Intramuscular Take As Directed Joceline Lyn APRN   1 each at 07/16/19 1205   • HPV 9-Valent Recomb Vaccine suspension 1 syringe  1 syringe Intramuscular Take As Directed Joceline Lyn  [place], APRN   1 syringe at 10/17/19 1052       No Known Allergies    Social History     Socioeconomic History   • Marital status: Single     Spouse name: Not on file   • Number of children: Not on file   • Years of education: Not on file   • Highest education level: Not on file   Tobacco Use   • Smoking status: Never Smoker   • Smokeless tobacco: Never Used   Substance and Sexual Activity   • Alcohol use: No   • Drug use: No   • Sexual activity: No     Birth control/protection: OCP       Family History   Problem Relation Age of Onset   • Diabetes Paternal Grandmother    • No Known Problems Father    • No Known Problems Mother    • No Known Problems Sister    • Breast cancer Neg Hx    • Ovarian cancer Neg Hx    • Colon cancer Neg Hx    • Colon polyps Neg Hx        Review of Systems   Constitutional: Negative for activity change, appetite change, chills, diaphoresis, fatigue, fever and unexpected weight change.   HENT: Negative for ear pain, hearing loss, mouth sores, sore throat, trouble swallowing and voice change.    Eyes: Negative.    Respiratory: Negative for cough, choking, shortness of breath and wheezing.    Cardiovascular: Negative for chest pain and palpitations.   Gastrointestinal: Negative for abdominal pain, blood in stool, constipation, diarrhea, nausea and vomiting.   Endocrine: Negative for cold intolerance and heat intolerance.   Genitourinary: Negative for decreased urine volume, dysuria, frequency, hematuria and urgency.   Musculoskeletal: Negative for back pain, gait problem and myalgias.   Skin: Negative for color change, pallor and rash.   Allergic/Immunologic: Negative for food allergies and immunocompromised state.   Neurological: Negative for dizziness, tremors, seizures, syncope, weakness, light-headedness, numbness and headaches.   Hematological: Negative for adenopathy. Does not bruise/bleed easily.   Psychiatric/Behavioral: Negative for agitation and confusion. The patient is not  "nervous/anxious.    All other systems reviewed and are negative.      /70   Pulse 73   Ht 154.9 cm (61\")   Wt 60.8 kg (134 lb)   SpO2 98%   Breastfeeding? No   BMI 25.32 kg/m²   Body mass index is 25.32 kg/m².    Physical Exam   Constitutional: She is oriented to person, place, and time. She appears well-developed and well-nourished.   HENT:   Head: Normocephalic and atraumatic.   Eyes: Pupils are equal, round, and reactive to light.   Neck: Normal range of motion. Neck supple. No tracheal deviation present.   Cardiovascular: Normal rate, regular rhythm and normal heart sounds. Exam reveals no gallop and no friction rub.   No murmur heard.  Pulmonary/Chest: Effort normal and breath sounds normal. No respiratory distress. She has no wheezes. She has no rales. She exhibits no tenderness.   Abdominal: Soft. Bowel sounds are normal. She exhibits no distension. There is no hepatosplenomegaly. There is no tenderness. There is no rigidity, no rebound and no guarding.   Musculoskeletal: Normal range of motion. She exhibits no edema, tenderness or deformity.   Neurological: She is alert and oriented to person, place, and time. She has normal reflexes.   Skin: Skin is warm and dry. No rash noted. No pallor.   Psychiatric: She has a normal mood and affect. Her behavior is normal. Judgment and thought content normal.       ASSESSMENT AND PLAN    Patient's Body mass index is 25.32 kg/m². BMI is within normal parameters. No follow-up required..    Wendy was seen today for gi problem.    Diagnoses and all orders for this visit:    Abnormal esophagram  -     Case Request; Standing  -     Follow Anesthesia Guidelines / Standing Orders; Future  -     Obtain Informed Consent; Future  -     Implement Anesthesia Orders Day of Procedure; Standing  -     Obtain Informed Consent; Standing  -     Case Request    Esophageal dysphagia    Obesity, unspecified obesity severity, unspecified obesity type    Nonsmoker    cont PPI " therapy and smaller more frequent meals, chew food well.     ESOPHAGOGASTRODUODENOSCOPY WITH ANESTHESIA (N/A)      CHARLES Salgado  12:44 PM  11/4/2019    Obesity, Adult  Obesity is the condition of having too much total body fat. Being overweight or obese means that your weight is greater than what is considered healthy for your body size. Obesity is determined by a measurement called BMI. BMI is an estimate of body fat and is calculated from height and weight. For adults, a BMI of 30 or higher is considered obese.  Obesity can eventually lead to other health concerns and major illnesses, including:  · Stroke.  · Coronary artery disease (CAD).  · Type 2 diabetes.  · Some types of cancer, including cancers of the colon, breast, uterus, and gallbladder.  · Osteoarthritis.  · High blood pressure (hypertension).  · High cholesterol.  · Sleep apnea.  · Gallbladder stones.  · Infertility problems.  What are the causes?  The main cause of obesity is taking in (consuming) more calories than your body uses for energy. Other factors that contribute to this condition may include:  · Being born with genes that make you more likely to become obese.  · Having a medical condition that causes obesity. These conditions include:  ¨ Hypothyroidism.  ¨ Polycystic ovarian syndrome (PCOS).  ¨ Binge-eating disorder.  ¨ Cushing syndrome.  · Taking certain medicines, such as steroids, antidepressants, and seizure medicines.  · Not being physically active (sedentary lifestyle).  · Living where there are limited places to exercise safely or buy healthy foods.  · Not getting enough sleep.  What increases the risk?  The following factors may increase your risk of this condition:  · Having a family history of obesity.  · Being a woman of -American descent.  · Being a man of  descent.  What are the signs or symptoms?  Having excessive body fat is the main symptom of this condition.  How is this diagnosed?  This condition  may be diagnosed based on:  · Your symptoms.  · Your medical history.  · A physical exam. Your health care provider may measure:  ¨ Your BMI. If you are an adult with a BMI between 25 and less than 30, you are considered overweight. If you are an adult with a BMI of 30 or higher, you are considered obese.  ¨ The distances around your hips and your waist (circumferences). These may be compared to each other to help diagnose your condition.  ¨ Your skinfold thickness. Your health care provider may gently pinch a fold of your skin and measure it.  How is this treated?  Treatment for this condition often includes changing your lifestyle. Treatment may include some or all of the following:  · Dietary changes. Work with your health care provider and a dietitian to set a weight-loss goal that is healthy and reasonable for you. Dietary changes may include eating:  ¨ Smaller portions. A portion size is the amount of a particular food that is healthy for you to eat at one time. This varies from person to person.  ¨ Low-calorie or low-fat options.  ¨ More whole grains, fruits, and vegetables.  · Regular physical activity. This may include aerobic activity (cardio) and strength training.  · Medicine to help you lose weight. Your health care provider may prescribe medicine if you are unable to lose 1 pound a week after 6 weeks of eating more healthily and doing more physical activity.  · Surgery. Surgical options may include gastric banding and gastric bypass. Surgery may be done if:  ¨ Other treatments have not helped to improve your condition.  ¨ You have a BMI of 40 or higher.  ¨ You have life-threatening health problems related to obesity.  Follow these instructions at home:     Eating and drinking     · Follow recommendations from your health care provider about what you eat and drink. Your health care provider may advise you to:  ¨ Limit fast foods, sweets, and processed snack foods.  ¨ Choose low-fat options, such as  low-fat milk instead of whole milk.  ¨ Eat 5 or more servings of fruits or vegetables every day.  ¨ Eat at home more often. This gives you more control over what you eat.  ¨ Choose healthy foods when you eat out.  ¨ Learn what a healthy portion size is.  ¨ Keep low-fat snacks on hand.  ¨ Avoid sugary drinks, such as soda, fruit juice, iced tea sweetened with sugar, and flavored milk.  ¨ Eat a healthy breakfast.  · Drink enough water to keep your urine clear or pale yellow.  · Do not go without eating for long periods of time (do not fast) or follow a fad diet. Fasting and fad diets can be unhealthy and even dangerous.  Physical Activity   · Exercise regularly, as told by your health care provider. Ask your health care provider what types of exercise are safe for you and how often you should exercise.  · Warm up and stretch before being active.  · Cool down and stretch after being active.  · Rest between periods of activity.  Lifestyle   · Limit the time that you spend in front of your TV, computer, or video game system.  · Find ways to reward yourself that do not involve food.  · Limit alcohol intake to no more than 1 drink a day for nonpregnant women and 2 drinks a day for men. One drink equals 12 oz of beer, 5 oz of wine, or 1½ oz of hard liquor.  General instructions   · Keep a weight loss journal to keep track of the food you eat and how much you exercise you get.  · Take over-the-counter and prescription medicines only as told by your health care provider.  · Take vitamins and supplements only as told by your health care provider.  · Consider joining a support group. Your health care provider may be able to recommend a support group.  · Keep all follow-up visits as told by your health care provider. This is important.  Contact a health care provider if:  · You are unable to meet your weight loss goal after 6 weeks of dietary and lifestyle changes.  This information is not intended to replace advice given to you  by your health care provider. Make sure you discuss any questions you have with your health care provider.  Document Released: 01/25/2006 Document Revised: 05/22/2017 Document Reviewed: 10/05/2016  [employer] Patient Education © 2017 [employer]      IF YOU SMOKE OR USE TOBACCO PLEASE READ THE FOLLOWING:    Why is smoking bad for me?  Smoking increases the risk of heart disease, lung disease, vascular disease, stroke, and cancer.     If you smoke, STOP!    If you would like more information on quitting smoking, please visit the [employer] website: [email or web address]   This link will provide additional resources including the QUIT line and the [employer] support groups.     For more information:    Quit Now [facility]  1-800-QUIT-NOW  https://elvay.quitlogix.org/en-US/

## 2019-11-14 ENCOUNTER — ANESTHESIA EVENT (OUTPATIENT)
Dept: GASTROENTEROLOGY | Facility: HOSPITAL | Age: 19
End: 2019-11-14

## 2019-11-14 ENCOUNTER — ANESTHESIA (OUTPATIENT)
Dept: GASTROENTEROLOGY | Facility: HOSPITAL | Age: 19
End: 2019-11-14

## 2019-11-14 ENCOUNTER — HOSPITAL ENCOUNTER (OUTPATIENT)
Facility: HOSPITAL | Age: 19
Setting detail: HOSPITAL OUTPATIENT SURGERY
Discharge: HOME | End: 2019-11-14
Attending: INTERNAL MEDICINE | Admitting: INTERNAL MEDICINE

## 2019-11-14 VITALS
DIASTOLIC BLOOD PRESSURE: 74 MMHG | HEART RATE: 74 BPM | SYSTOLIC BLOOD PRESSURE: 113 MMHG | TEMPERATURE: 98 F | BODY MASS INDEX: 29.07 KG/M2 | OXYGEN SATURATION: 95 % | HEIGHT: 61 IN | WEIGHT: 154 LBS | RESPIRATION RATE: 18 BRPM

## 2019-11-14 DIAGNOSIS — R93.3 ABNORMAL ESOPHAGRAM: ICD-10-CM

## 2019-11-14 DIAGNOSIS — R10.13 DYSPEPSIA: Primary | ICD-10-CM

## 2019-11-14 LAB — B-HCG UR QL: NEGATIVE

## 2019-11-14 PROCEDURE — 88305 TISSUE EXAM BY PATHOLOGIST: CPT | Performed by: INTERNAL MEDICINE

## 2019-11-14 PROCEDURE — 25010000002 PROPOFOL 10 MG/ML EMULSION: Performed by: NURSE ANESTHETIST, CERTIFIED REGISTERED

## 2019-11-14 PROCEDURE — 81025 URINE PREGNANCY TEST: CPT | Performed by: ANESTHESIOLOGY

## 2019-11-14 PROCEDURE — 43239 EGD BIOPSY SINGLE/MULTIPLE: CPT | Performed by: INTERNAL MEDICINE

## 2019-11-14 RX ORDER — PSEUDOEPHEDRINE HCL 30 MG
30 TABLET ORAL EVERY 4 HOURS PRN
COMMUNITY
End: 2021-03-25

## 2019-11-14 RX ORDER — SODIUM CHLORIDE 0.9 % (FLUSH) 0.9 %
10 SYRINGE (ML) INJECTION AS NEEDED
Status: DISCONTINUED | OUTPATIENT
Start: 2019-11-14 | End: 2019-11-14 | Stop reason: HOSPADM

## 2019-11-14 RX ORDER — PROPOFOL 10 MG/ML
VIAL (ML) INTRAVENOUS AS NEEDED
Status: DISCONTINUED | OUTPATIENT
Start: 2019-11-14 | End: 2019-11-14 | Stop reason: SURG

## 2019-11-14 RX ORDER — SODIUM CHLORIDE 9 MG/ML
500 INJECTION, SOLUTION INTRAVENOUS CONTINUOUS PRN
Status: DISCONTINUED | OUTPATIENT
Start: 2019-11-14 | End: 2019-11-14 | Stop reason: HOSPADM

## 2019-11-14 RX ADMIN — LIDOCAINE HYDROCHLORIDE 100 MG: 20 INJECTION, SOLUTION INTRAVENOUS at 12:44

## 2019-11-14 RX ADMIN — LIDOCAINE HYDROCHLORIDE 100 MG: 20 INJECTION, SOLUTION INTRAVENOUS at 12:45

## 2019-11-14 RX ADMIN — SODIUM CHLORIDE 500 ML: 9 INJECTION, SOLUTION INTRAVENOUS at 11:49

## 2019-11-14 RX ADMIN — PROPOFOL 220 MG: 10 INJECTION, EMULSION INTRAVENOUS at 12:44

## 2019-11-14 NOTE — DISCHARGE INSTRUCTIONS
If you do not hear from the scheduling department within 48 hours please call [phone] option [phone] thank you

## 2019-11-14 NOTE — ANESTHESIA PREPROCEDURE EVALUATION
Anesthesia Evaluation     NPO Solid Status: > 8 hours  NPO Liquid Status: > 8 hours           Airway   Mallampati: I  TM distance: >3 FB  Neck ROM: full  No difficulty expected  Dental      Pulmonary    Cardiovascular   Exercise tolerance: good (4-7 METS)    (-) hypertension, CAD      Neuro/Psych  (-) seizures, CVA  GI/Hepatic/Renal/Endo    (+)  GERD,    (-) liver disease, no renal disease, diabetes    Musculoskeletal     Abdominal    Substance History      OB/GYN          Other                        Anesthesia Plan    ASA 2     MAC     intravenous induction     Anesthetic plan, all risks, benefits, and alternatives have been provided, discussed and informed consent has been obtained with: patient.

## 2019-11-15 LAB
LAB AP CASE REPORT: NORMAL
PATH REPORT.FINAL DX SPEC: NORMAL
PATH REPORT.GROSS SPEC: NORMAL

## 2019-11-18 RX ORDER — ESCITALOPRAM OXALATE 10 MG/1
TABLET ORAL
Qty: 30 TABLET | Refills: 2 | Status: SHIPPED | OUTPATIENT
Start: 2019-11-18 | End: 2020-02-25

## 2019-11-20 ENCOUNTER — HOSPITAL ENCOUNTER (OUTPATIENT)
Dept: NUCLEAR MEDICINE | Facility: HOSPITAL | Age: 19
Discharge: HOME | End: 2019-11-20

## 2019-11-20 DIAGNOSIS — R10.13 DYSPEPSIA: ICD-10-CM

## 2019-11-20 PROCEDURE — 78264 GASTRIC EMPTYING IMG STUDY: CPT

## 2019-11-20 PROCEDURE — 0 TECHNETIUM SULFUR COLLOID: Performed by: INTERNAL MEDICINE

## 2019-11-20 PROCEDURE — A9541 TC99M SULFUR COLLOID: HCPCS | Performed by: INTERNAL MEDICINE

## 2019-11-20 RX ADMIN — TECHNETIUM TC 99M SULFUR COLLOID 1 DOSE: KIT at 09:54

## 2019-11-22 ENCOUNTER — TELEPHONE (OUTPATIENT)
Dept: GASTROENTEROLOGY | Facility: CLINIC | Age: 19
End: 2019-11-22

## 2019-11-26 ENCOUNTER — OFFICE VISIT (OUTPATIENT)
Dept: GASTROENTEROLOGY | Facility: CLINIC | Age: 19
End: 2019-11-26

## 2019-11-26 VITALS
DIASTOLIC BLOOD PRESSURE: 80 MMHG | SYSTOLIC BLOOD PRESSURE: 122 MMHG | HEART RATE: 68 BPM | BODY MASS INDEX: 25.3 KG/M2 | OXYGEN SATURATION: 98 % | HEIGHT: 61 IN | WEIGHT: 134 LBS

## 2019-11-26 DIAGNOSIS — R10.13 DYSPEPSIA: Primary | ICD-10-CM

## 2019-11-26 PROCEDURE — 99212 OFFICE O/P EST SF 10 MIN: CPT | Performed by: INTERNAL MEDICINE

## 2019-11-26 NOTE — PROGRESS NOTES
[facility] - Office note  11/26/2019    Wendy Newby 2000     Chief Complaint   Patient presents with   • GI Problem     Dyspepsia       HISTORY OF PRESENT ILLNESS    Wendy Newby is a 19 y.o. female who presents for follow-up.  Overall seems to be better.  She is changed her diet.  Endoscopy was normal.  Biopsies were normal.  Gastric emptying study showed significant delay in her gastric emptying.  She is on no medications that should inhibit gastric emptying.  Overall I think she is feeling a bit better.    Past Medical History:   Diagnosis Date   • Allergic     seasonal allergies   • PCOS (polycystic ovarian syndrome)    • PCOS (polycystic ovarian syndrome)    • Vertigo     r/t to severe allergy symptoms       Past Surgical History:   Procedure Laterality Date   • ENDOSCOPY N/A 11/14/2019    Normal exam   • NO PAST SURGERIES           Current Outpatient Medications:   •  Alpha-D-Galactosidase (GAS-X PREVENTION) capsule, Take  by mouth. Prn, Disp: , Rfl:   •  cetirizine (zyrTEC) 10 MG tablet, Take 10 mg by mouth Daily As Needed for allergies., Disp: , Rfl:   •  escitalopram (LEXAPRO) 10 MG tablet, TAKE ONE TABLET BY MOUTH DAILY, Disp: 30 tablet, Rfl: 2  •  fluticasone (FLONASE) 50 MCG/ACT nasal spray, 2 sprays into the nostril(s) as directed by provider Daily., Disp: , Rfl:   •  omeprazole (priLOSEC) 20 MG capsule, Take 1 capsule by mouth Daily., Disp: 30 capsule, Rfl: 11  •  pseudoephedrine (SUDAFED) 30 MG tablet, Take 30 mg by mouth Every 4 (Four) Hours As Needed for Congestion., Disp: , Rfl:     Current Facility-Administered Medications:   •  HPV 9-Valent Recomb Vaccine suspension 1 each, 1 each, Intramuscular, Take As Directed, Joceline Lyn APRN, 1 each at 07/16/19 1205  •  HPV 9-Valent Recomb Vaccine suspension 1 syringe, 1 syringe, Intramuscular, Take As Directed, Joceline Lyn APRN, 1 syringe at 10/17/19 1052    No Known Allergies    Social History  "    Socioeconomic History   • Marital status: Single     Spouse name: Not on file   • Number of children: Not on file   • Years of education: Not on file   • Highest education level: Not on file   Tobacco Use   • Smoking status: Never Smoker   • Smokeless tobacco: Never Used   Substance and Sexual Activity   • Alcohol use: No   • Drug use: No   • Sexual activity: No     Birth control/protection: OCP       Family History   Problem Relation Age of Onset   • Diabetes Paternal Grandmother    • No Known Problems Father    • No Known Problems Mother    • No Known Problems Sister    • Breast cancer Neg Hx    • Ovarian cancer Neg Hx    • Colon cancer Neg Hx    • Colon polyps Neg Hx        Review of Systems   Respiratory: Negative.    Cardiovascular: Negative.    Gastrointestinal: Negative.        Vitals:    11/26/19 1348   BP: 122/80   Pulse: 68   SpO2: 98%   Weight: 60.8 kg (134 lb)   Height: 154.9 cm (61\")    Body mass index is 25.32 kg/m².    Physical Exam    Lab Results - Last 18 Months   Lab Units 08/09/18  0713   BUN mg/dL 12   CREATININE mg/dL 0.71   SODIUM mmol/L 140   POTASSIUM mmol/L 4.6   CHLORIDE mmol/L 103   TOTAL CO2 mmol/L 27.0   ALBUMIN g/dL 4.20   ALT (SGPT) U/L 22   AST (SGOT) U/L 28   ALK PHOS U/L 140*   BILIRUBIN mg/dL 0.4         ASSESSMENT AND PLAN      1. Dyspepsia  Based on her gastric emptying study I would say this is the source of her symptoms.  We talked about modifying her diet.  No risk factors such as diabetes or opioid use.  My guess is this is simply idiopathic gastroparesis which should improve with time.  She is instructed to call if she has progressive symptoms or if anything changes otherwise I will plan to see her back as needed       EMR Dragon/transcription disclaimer: Much of this encounter note is an electronic transcription/translation of spoken language to printed text.  The electronic translation of spoken language may permit erroneous, or at times, nonsensical words or phrases " to be inadvertently transcribed.  Although I have reviewed the note for such errors, some may still exist.    Bebeto Rivera MD  11/26/2019  2:03 PM

## 2020-01-23 ENCOUNTER — OFFICE VISIT (OUTPATIENT)
Dept: OBSTETRICS AND GYNECOLOGY | Facility: CLINIC | Age: 20
End: 2020-01-23

## 2020-01-23 VITALS
HEIGHT: 61 IN | WEIGHT: 154 LBS | DIASTOLIC BLOOD PRESSURE: 76 MMHG | SYSTOLIC BLOOD PRESSURE: 112 MMHG | BODY MASS INDEX: 29.07 KG/M2

## 2020-01-23 DIAGNOSIS — E28.2 PCOS (POLYCYSTIC OVARIAN SYNDROME): Primary | ICD-10-CM

## 2020-01-23 PROCEDURE — 99213 OFFICE O/P EST LOW 20 MIN: CPT | Performed by: NURSE PRACTITIONER

## 2020-01-24 DIAGNOSIS — R79.89 ELEVATED TESTOSTERONE LEVEL: Primary | ICD-10-CM

## 2020-01-24 LAB
INSULIN SERPL-ACNC: 13.8 UIU/ML (ref 2.6–24.9)
TESTOST SERPL-MCNC: 63 NG/DL (ref 8–48)

## 2020-01-24 RX ORDER — SPIRONOLACTONE 25 MG/1
25 TABLET ORAL DAILY
Qty: 60 TABLET | Refills: 2 | Status: SHIPPED | OUTPATIENT
Start: 2020-01-24 | End: 2020-02-13 | Stop reason: SDUPTHER

## 2020-01-24 NOTE — PROGRESS NOTES
Subjective   Wendy Newby is a 19 y.o. female  YOB: 2000      Chief Complaint   Patient presents with   • Polycystic Ovary Syndrome     Patient here for follow up on PCOS with fasting labs.        Patient here for follow-up of PCOS.      The following portions of the patient's history were reviewed and updated as appropriate: allergies, current medications, past family history, past medical history, past social history, past surgical history and problem list.    No Known Allergies    Past Medical History:   Diagnosis Date   • Allergic     seasonal allergies   • PCOS (polycystic ovarian syndrome)    • PCOS (polycystic ovarian syndrome)    • Vertigo     r/t to severe allergy symptoms       Family History   Problem Relation Age of Onset   • Diabetes Paternal Grandmother    • No Known Problems Father    • No Known Problems Mother    • No Known Problems Sister    • Breast cancer Neg Hx    • Ovarian cancer Neg Hx    • Colon cancer Neg Hx    • Colon polyps Neg Hx        Social History     Socioeconomic History   • Marital status: Single     Spouse name: Not on file   • Number of children: Not on file   • Years of education: Not on file   • Highest education level: Not on file   Tobacco Use   • Smoking status: Never Smoker   • Smokeless tobacco: Never Used   Substance and Sexual Activity   • Alcohol use: No   • Drug use: No   • Sexual activity: Never     Birth control/protection: OCP         Current Outpatient Medications:   •  Alpha-D-Galactosidase (GAS-X PREVENTION) capsule, Take  by mouth. Prn, Disp: , Rfl:   •  cetirizine (zyrTEC) 10 MG tablet, Take 10 mg by mouth Daily As Needed for allergies., Disp: , Rfl:   •  escitalopram (LEXAPRO) 10 MG tablet, TAKE ONE TABLET BY MOUTH DAILY, Disp: 30 tablet, Rfl: 2  •  fluticasone (FLONASE) 50 MCG/ACT nasal spray, 2 sprays into the nostril(s) as directed by provider Daily., Disp: , Rfl:   •  omeprazole (priLOSEC) 20 MG capsule, Take 1 capsule by mouth Daily.,  Disp: 30 capsule, Rfl: 11  •  pseudoephedrine (SUDAFED) 30 MG tablet, Take 30 mg by mouth Every 4 (Four) Hours As Needed for Congestion., Disp: , Rfl:   •  spironolactone (ALDACTONE) 25 MG tablet, Take 1 tablet by mouth Daily., Disp: 60 tablet, Rfl: 2    Current Facility-Administered Medications:   •  HPV 9-Valent Recomb Vaccine suspension 1 each, 1 each, Intramuscular, Take As Directed, [place], Joceline Ricardo, APRN, 1 each at 07/16/19 1205  •  HPV 9-Valent Recomb Vaccine suspension 1 syringe, 1 syringe, Intramuscular, Take As Directed, [place], Joceline Haleigh, APRN, 1 syringe at 10/17/19 1052    No LMP recorded.    Sexual History:         Could not be calculated    Past Surgical History:   Procedure Laterality Date   • ENDOSCOPY N/A 11/14/2019    Normal exam   • NO PAST SURGERIES         Review of Systems   Constitutional: Negative for activity change, appetite change, chills, diaphoresis, fatigue, fever and unexpected weight change.   HENT: Negative for congestion, dental problem, drooling, ear discharge, ear pain, facial swelling, hearing loss, mouth sores, nosebleeds, postnasal drip, rhinorrhea, sinus pressure, sinus pain, sneezing, sore throat, tinnitus, trouble swallowing and voice change.    Eyes: Negative for photophobia, pain, discharge, redness, itching and visual disturbance.   Respiratory: Negative for apnea, cough, choking, chest tightness, shortness of breath, wheezing and stridor.    Cardiovascular: Negative for chest pain, palpitations and leg swelling.   Gastrointestinal: Negative for abdominal distention, abdominal pain, anal bleeding, blood in stool, constipation, diarrhea, nausea, rectal pain and vomiting.   Endocrine: Negative for cold intolerance, heat intolerance, polydipsia, polyphagia and polyuria.   Genitourinary: Negative for decreased urine volume, difficulty urinating, dyspareunia, dysuria, enuresis, flank pain, frequency, genital sores, hematuria, menstrual problem, pelvic pain, urgency,  "vaginal bleeding, vaginal discharge and vaginal pain.   Musculoskeletal: Negative for arthralgias, back pain, gait problem, joint swelling, myalgias, neck pain and neck stiffness.   Skin: Negative for color change, pallor, rash and wound.   Allergic/Immunologic: Negative for environmental allergies, food allergies and immunocompromised state.   Neurological: Negative for dizziness, tremors, seizures, syncope, facial asymmetry, speech difficulty, weakness, light-headedness, numbness and headaches.   Hematological: Negative for adenopathy. Does not bruise/bleed easily.   Psychiatric/Behavioral: Negative for agitation, behavioral problems, confusion, decreased concentration, dysphoric mood, hallucinations, self-injury, sleep disturbance and suicidal ideas. The patient is not nervous/anxious and is not hyperactive.        Objective   Physical Exam   Constitutional: She is oriented to person, place, and time. She appears well-developed and well-nourished.   HENT:   Head: Normocephalic.   Eyes: Pupils are equal, round, and reactive to light.   Neck: Normal range of motion.   Cardiovascular: Normal rate and regular rhythm.   Pulmonary/Chest: Effort normal and breath sounds normal.   Abdominal: Soft.   Musculoskeletal: Normal range of motion.   Neurological: She is alert and oriented to person, place, and time.   Skin: Skin is warm and dry.   Psychiatric: She has a normal mood and affect. Her behavior is normal.   Nursing note and vitals reviewed.        Vitals:    01/23/20 0810   BP: 112/76   Weight: 69.9 kg (154 lb)   Height: 154.9 cm (61\")       Wendy was seen today for polycystic ovary syndrome.    Diagnoses and all orders for this visit:    PCOS (polycystic ovarian syndrome)  Comments:  Patient here for follow-up of PCOS.  PCOS labs today -follow-up pending results.  Orders:  -     Insulin, Total  -     Testosterone          Patient's Body mass index is 29.1 kg/m². BMI is above normal parameters. Recommendations " include: exercise counseling and nutrition counseling.             Non-Smoker    MyChart Instructions Given

## 2020-01-28 ENCOUNTER — OFFICE VISIT (OUTPATIENT)
Dept: FAMILY MEDICINE CLINIC | Facility: CLINIC | Age: 20
End: 2020-01-28

## 2020-01-28 VITALS
BODY MASS INDEX: 29.07 KG/M2 | WEIGHT: 154 LBS | DIASTOLIC BLOOD PRESSURE: 74 MMHG | HEIGHT: 61 IN | OXYGEN SATURATION: 98 % | HEART RATE: 69 BPM | RESPIRATION RATE: 16 BRPM | SYSTOLIC BLOOD PRESSURE: 108 MMHG

## 2020-01-28 DIAGNOSIS — R10.13 DYSPEPSIA: Primary | ICD-10-CM

## 2020-01-28 PROCEDURE — 99213 OFFICE O/P EST LOW 20 MIN: CPT | Performed by: FAMILY MEDICINE

## 2020-01-28 NOTE — PROGRESS NOTES
Subjective   Wendy Newby is a 19 y.o. female.     No chief complaint on file.       History of Present Illness     she nots her dyspepsia is better with prilosec--taking spironolactone from her gyn..      Current Outpatient Medications:   •  Alpha-D-Galactosidase (GAS-X PREVENTION) capsule, Take  by mouth. Prn, Disp: , Rfl:   •  cetirizine (zyrTEC) 10 MG tablet, Take 10 mg by mouth Daily As Needed for allergies., Disp: , Rfl:   •  escitalopram (LEXAPRO) 10 MG tablet, TAKE ONE TABLET BY MOUTH DAILY, Disp: 30 tablet, Rfl: 2  •  fluticasone (FLONASE) 50 MCG/ACT nasal spray, 2 sprays into the nostril(s) as directed by provider Daily., Disp: , Rfl:   •  omeprazole (priLOSEC) 20 MG capsule, Take 1 capsule by mouth Daily., Disp: 30 capsule, Rfl: 11  •  spironolactone (ALDACTONE) 25 MG tablet, Take 1 tablet by mouth Daily., Disp: 60 tablet, Rfl: 2  •  pseudoephedrine (SUDAFED) 30 MG tablet, Take 30 mg by mouth Every 4 (Four) Hours As Needed for Congestion., Disp: , Rfl:     Current Facility-Administered Medications:   •  HPV 9-Valent Recomb Vaccine suspension 1 each, 1 each, Intramuscular, Take As Directed, Joceline Lyn APRN, 1 each at 07/16/19 1205  •  HPV 9-Valent Recomb Vaccine suspension 1 syringe, 1 syringe, Intramuscular, Take As Directed, Joceline Lyn APRN, 1 syringe at 10/17/19 1052  No Known Allergies    Past Medical History:   Diagnosis Date   • Allergic     seasonal allergies   • PCOS (polycystic ovarian syndrome)    • PCOS (polycystic ovarian syndrome)    • Vertigo     r/t to severe allergy symptoms     Past Surgical History:   Procedure Laterality Date   • ENDOSCOPY N/A 11/14/2019    Normal exam   • NO PAST SURGERIES         Review of Systems   Constitutional: Negative.    HENT: Negative.    Eyes: Negative.    Respiratory: Negative.    Cardiovascular: Negative.    Gastrointestinal: Negative.    Endocrine: Negative.    Genitourinary: Negative.    Musculoskeletal: Negative.    Skin: Negative.   "  Allergic/Immunologic: Negative.    Neurological: Negative.    Hematological: Negative.    Psychiatric/Behavioral: Negative.        Objective  /74   Pulse 69   Resp 16   Ht 154.9 cm (61\")   Wt 69.9 kg (154 lb)   SpO2 98%   BMI 29.10 kg/m²   Physical Exam   Constitutional: She is oriented to person, place, and time. She appears well-developed and well-nourished.   HENT:   Head: Normocephalic and atraumatic.   Right Ear: External ear normal.   Left Ear: External ear normal.   Nose: Nose normal.   Mouth/Throat: Oropharynx is clear and moist.   Eyes: Pupils are equal, round, and reactive to light. Conjunctivae and EOM are normal.   Neck: Normal range of motion. Neck supple.   Cardiovascular: Normal rate, regular rhythm, normal heart sounds and intact distal pulses.   Pulmonary/Chest: Effort normal and breath sounds normal.   Abdominal: Soft.   Musculoskeletal: Normal range of motion.   Neurological: She is alert and oriented to person, place, and time.   Skin: Skin is warm. Capillary refill takes less than 2 seconds.   Psychiatric: She has a normal mood and affect. Her behavior is normal. Judgment and thought content normal.   Nursing note and vitals reviewed.      Assessment/Plan   Diagnoses and all orders for this visit:    Dyspepsia  -     TSH  -     T4, Free  -     Ambulatory Referral to Nutrition Services    she requets to have her thyroid retested           Orders Placed This Encounter   Procedures   • TSH   • T4, Free   • Ambulatory Referral to Nutrition Services     Referral Priority:   Routine     Referral Type:   Health Education     Referral Reason:   Specialty Services Required     Number of Visits Requested:   1       Follow up: 6 month(s)  "

## 2020-01-31 LAB
T4 FREE SERPL-MCNC: 1.07 NG/DL (ref 0.93–1.7)
TSH SERPL DL<=0.005 MIU/L-ACNC: 2.05 UIU/ML (ref 0.27–4.2)

## 2020-02-13 ENCOUNTER — TELEPHONE (OUTPATIENT)
Dept: OBSTETRICS AND GYNECOLOGY | Facility: CLINIC | Age: 20
End: 2020-02-13

## 2020-02-13 ENCOUNTER — PATIENT MESSAGE (OUTPATIENT)
Dept: OBSTETRICS AND GYNECOLOGY | Facility: CLINIC | Age: 20
End: 2020-02-13

## 2020-02-13 DIAGNOSIS — R79.89 ELEVATED TESTOSTERONE LEVEL: ICD-10-CM

## 2020-02-13 RX ORDER — SPIRONOLACTONE 25 MG/1
25 TABLET ORAL 2 TIMES DAILY
Qty: 60 TABLET | Refills: 2 | Status: SHIPPED | OUTPATIENT
Start: 2020-02-13 | End: 2020-04-27 | Stop reason: SDUPTHER

## 2020-02-13 NOTE — TELEPHONE ENCOUNTER
Patient called stating that her script for aldactone on the bottle read take 1 tablet daily, but in her my chart message from Joceline it states take 2 tablets daily,... She is just wanting to clarify if someone will please call her back.

## 2020-02-25 RX ORDER — ESCITALOPRAM OXALATE 10 MG/1
TABLET ORAL
Qty: 30 TABLET | Refills: 1 | Status: SHIPPED | OUTPATIENT
Start: 2020-02-25 | End: 2020-05-04

## 2020-04-09 ENCOUNTER — APPOINTMENT (OUTPATIENT)
Dept: NUTRITION | Facility: HOSPITAL | Age: 20
End: 2020-04-09

## 2020-04-24 ENCOUNTER — PATIENT MESSAGE (OUTPATIENT)
Dept: OBSTETRICS AND GYNECOLOGY | Facility: CLINIC | Age: 20
End: 2020-04-24

## 2020-04-24 DIAGNOSIS — R79.89 ELEVATED TESTOSTERONE LEVEL: ICD-10-CM

## 2020-04-27 RX ORDER — SPIRONOLACTONE 25 MG/1
25 TABLET ORAL 2 TIMES DAILY
Qty: 60 TABLET | Refills: 2 | Status: SHIPPED | OUTPATIENT
Start: 2020-04-27 | End: 2021-02-05

## 2020-04-27 NOTE — TELEPHONE ENCOUNTER
From: Wendy Newby  To: Joceline Lyn APRN  Sent: 4/24/2020 1:58 PM CDT  Subject: Prescription Question    Veronika Car,     My perscription has been messed up again at my pharmacy. It says to take 1 by mouth daily instead of 2. Can you contact them to get that fixed?     Thanks!   Wendy

## 2020-05-04 RX ORDER — ESCITALOPRAM OXALATE 10 MG/1
TABLET ORAL
Qty: 30 TABLET | Refills: 2 | Status: SHIPPED | OUTPATIENT
Start: 2020-05-04 | End: 2020-08-10

## 2020-05-28 ENCOUNTER — APPOINTMENT (OUTPATIENT)
Dept: NUTRITION | Facility: HOSPITAL | Age: 20
End: 2020-05-28

## 2020-06-25 ENCOUNTER — RESULTS ENCOUNTER (OUTPATIENT)
Dept: FAMILY MEDICINE CLINIC | Facility: CLINIC | Age: 20
End: 2020-06-25

## 2020-06-25 ENCOUNTER — TELEPHONE (OUTPATIENT)
Dept: FAMILY MEDICINE CLINIC | Facility: CLINIC | Age: 20
End: 2020-06-25

## 2020-06-25 DIAGNOSIS — R50.9 FEVER, UNSPECIFIED FEVER CAUSE: ICD-10-CM

## 2020-06-25 DIAGNOSIS — R05.9 COUGHING: Primary | ICD-10-CM

## 2020-06-25 DIAGNOSIS — R05.9 COUGHING: ICD-10-CM

## 2020-06-25 NOTE — TELEPHONE ENCOUNTER
PT called, states she's experiencing the following symptoms: headache, sore throat, and fatigue. She'd like Dr. Wheeler to place orders for her to complete COVID-19 testing, preferably at [facility].     Please add orders and reach out to Wendy when they're available: [phone].

## 2020-06-29 ENCOUNTER — TELEPHONE (OUTPATIENT)
Dept: FAMILY MEDICINE CLINIC | Facility: CLINIC | Age: 20
End: 2020-06-29

## 2020-08-10 RX ORDER — ESCITALOPRAM OXALATE 10 MG/1
TABLET ORAL
Qty: 30 TABLET | Refills: 5 | Status: SHIPPED | OUTPATIENT
Start: 2020-08-10 | End: 2021-02-22

## 2020-08-26 ENCOUNTER — HOSPITAL ENCOUNTER (OUTPATIENT)
Dept: NUTRITION | Facility: HOSPITAL | Age: 20
Discharge: HOME | End: 2020-08-26

## 2020-08-26 VITALS — BODY MASS INDEX: 29.64 KG/M2 | HEIGHT: 61 IN | WEIGHT: 157 LBS

## 2020-08-26 NOTE — PROGRESS NOTES
"Adult Outpatient Nutrition  Assessment/PES    Patient Name:  Wendy Newby  YOB: 2000  MRN: [id number]    Assessment Date:  8/26/2020    Comments:  reports that she has had reflux and gastroparesis diagnosed several months ago. Pt reports she has been having increased stomach pain,nausea,unable to eat her normal amount,fatigue and stomach cramping. Pt follows gluten free and non dairy diet. Discussed foods to avoid with reflux disease, encouraged small frequent meals,limited caffeine beverages, and drinking fluids between meals. Recommended pt to avoid high fat foods,discussed fiber intake, consideration of Low FodMap diet and a food diary to monitor symptoms. RDN to electronically mail and mail written material on subjects discussed. RDN contact information available for any additional concerns or questions.    General Info     Row Name 08/26/20 1151       Today's Session    Person(s) attending today's session  Patient     Services Used Today?  No       General Information    How Well Do You Speak English?  well    Preferred Language  English        Physical Findings     Row Name 08/26/20 1154          Physical Findings    Overall Physical Appearance  overweight         Anthropometrics     Row Name 08/26/20 1154          Anthropometrics    Height  154.9 cm (61\")     Weight  71.2 kg (157 lb)        Ideal Body Weight (IBW)    Ideal Body Weight (IBW) (kg)  48.15     % Ideal Body Weight  147.91        Usual Body Weight (UBW)    Usual Body Weight  70.3 kg (155 lb) 155-159lbs     % Usual Body Weight  101.29        Body Mass Index (BMI)    BMI (kg/m2)  29.73         Nutritional Info/Activity     Row Name 08/26/20 1158       Nutritional Information    History of eating disorder?  No    Do you have difficulty chewing food?  No    Functional Status  ambulatory;able to purchase food;able to prepare meals    List any food cravings/trigger foods you have  has been Gluten Free & Dairy Free. " "Trigger foods: raw vegetables,apples,citrus juice/fruits and beans    How many times do you drink milk per day?  -- non dairy milk/ almond milk    How many times do you eat fruit per day?  1    How many times do you eat vegetables per day?  1    How many diet sodas do you drink per day?  0    How many regular sodas do you drink per day?  0    How many times do you have caffeine per day?  1 1-2    How many servings of artificial sweetner do you have per day?  0    What is the biggest challenge you have with your diet?  Food causing negative symptoms    Enter everything you can remember eating in the last 24 hours (1 day)  gluten free bread,banana,and peanut butter,smoothie made w/ Pea Protein powder and almond milk,turkey burger and potatoes,water        Home Nutrition Report     Row Name 08/26/20 1159          Home Nutrition Report    Diet  No specific     Typical Food/Fluid Intake  cereal or eggs at breakfast, smoothie at lunch, meat and vegetables w/ dinner, water, 1 cup coffee/day, and 1 12 oz. glass of tea     Supplemental Drinks/Foods/Additives  uses a pea protein based powder; Powdered electroylte used q 48 hrs.     Factors Affecting Nutritional Intake  pain;other (see comments) stomach cramping,stomach pain         Estimated/Assessed Needs     Row Name 08/26/20 1203 08/26/20 1154       Calculation Measurements    Weight Used For Calculations  71.2 kg (157 lb)  --    Height  --  154.9 cm (61\")       Estimated/Assessed Needs    Additional Documentation  KCAL/KG (Group);Calorie Requirements (Group);Protein Requirements (Group);Fluid Requirements (Group)  --       Calorie Requirements    Estimated Calorie Requirement (kcal/day)  1780  --    Estimated Calorie Need Method  kcal/kg  --    Estimated Calorie Requirement Comment  9226-1064  --       KCAL/KG    KCAL/KG  25 Kcal/Kg (kcal)  --    25 Kcal/Kg (kcal)  1780.375  --       Protein Requirements    Weight Used For Protein Calculations  71.2 kg (157 lb)  --    Est " Protein Requirement Amount (gms/kg)  1.0 gm protein  --    Estimated Protein Requirements (gms/day)  71.22  --       Fluid Requirements    Estimated Fluid Requirements (mL/day)  1780  --    Estimated Fluid Requirement Method  RDA Method  --    RDA Method (mL)  1780  --        Problem/Interventions:  Problem 1     Row Name 08/26/20 1205          Nutrition Diagnoses Problem 1    Problem 1  Knowledge Deficit     Etiology (related to)  Medical Diagnosis;Factors Affecting Nutrition     Gastrointestinal  Gastroparesis;GERD/Reflux     Reported/Observed By  Patient     Appetite  Early Satiety     Reported GI Symptoms  Abdominal Pain;Reflux;Other cramping     Signs/Symptoms (evidenced by)  Reported  Information Deficit           Intervention Goal     Row Name 08/26/20 1207          Intervention Goal    General  Maintain nutrition;Disease management/therapy     PO  Tolerate PO     Weight  No significant weight loss           Education/Evaluation     Row Name 08/26/20 1209          Education    Education  Provided education regarding;Education topics;Advised regarding habits/behavior     Provided education regarding  Diet rationale;Key food habit change;Medical diagnosis     Education Topics  Fiber;GI disease;Other (comment) GERD/Gastroparesis     Advised Regarding Habits/Behavior  Appropriate beverage;Snacks;Self monitor;Food choices;Label reading;Meal planning;Seasoning food        Monitor/Evaluation    Monitor  Per protocol         Electronically signed by:  Lela Quezada MS,RDN,LD  08/26/20 12:13

## 2020-09-01 ENCOUNTER — OFFICE VISIT (OUTPATIENT)
Dept: GASTROENTEROLOGY | Facility: CLINIC | Age: 20
End: 2020-09-01

## 2020-09-01 VITALS
WEIGHT: 156 LBS | TEMPERATURE: 97.8 F | SYSTOLIC BLOOD PRESSURE: 132 MMHG | OXYGEN SATURATION: 98 % | BODY MASS INDEX: 29.45 KG/M2 | HEART RATE: 83 BPM | DIASTOLIC BLOOD PRESSURE: 84 MMHG | HEIGHT: 61 IN

## 2020-09-01 DIAGNOSIS — R11.0 NAUSEA: ICD-10-CM

## 2020-09-01 DIAGNOSIS — K31.84 GASTROPARESIS: ICD-10-CM

## 2020-09-01 DIAGNOSIS — Z78.9 NONSMOKER: ICD-10-CM

## 2020-09-01 DIAGNOSIS — E66.9 OBESITY, UNSPECIFIED OBESITY SEVERITY, UNSPECIFIED OBESITY TYPE: ICD-10-CM

## 2020-09-01 DIAGNOSIS — R68.81 EARLY SATIETY: ICD-10-CM

## 2020-09-01 DIAGNOSIS — R93.3 ABNORMAL ESOPHAGRAM: Primary | ICD-10-CM

## 2020-09-01 PROCEDURE — 99213 OFFICE O/P EST LOW 20 MIN: CPT | Performed by: CLINICAL NURSE SPECIALIST

## 2020-09-01 NOTE — PROGRESS NOTES
Wendy Newby  2000    9/4/2020  Chief Complaint   Patient presents with   • GI Problem     Bloating lots of gas     Subjective   HPI  Wendy Newby is a 20 y.o. female who presents with a complaint of recurrent abdominal pain and feeling full quickly after a meal or even a glass of water. She has fullness and nausea. Moderate to severe at times worse progressive for the past 2 weeks. Workup has included a normal Endoscopy in 11/2019 and gastroparesis noted on gastric emptying with time to half of emptying of the contents from the stomach being 123 minutes. Esophagram showed delayed clearance of her esophagus. She does have PCOS and states that she has not been diagnosed with diabetes. She eats smaller more frequent meals with continued symptoms.    Past Medical History:   Diagnosis Date   • Allergic     seasonal allergies   • PCOS (polycystic ovarian syndrome)    • PCOS (polycystic ovarian syndrome)    • Vertigo     r/t to severe allergy symptoms     Past Surgical History:   Procedure Laterality Date   • ENDOSCOPY N/A 11/14/2019    Normal exam   • NO PAST SURGERIES         Outpatient Medications Marked as Taking for the 9/1/20 encounter (Office Visit) with Isadora Miranda APRN   Medication Sig Dispense Refill   • Alpha-D-Galactosidase (GAS-X PREVENTION) capsule Take  by mouth. Prn     • cetirizine (zyrTEC) 10 MG tablet Take 10 mg by mouth Daily As Needed for allergies.     • escitalopram (LEXAPRO) 10 MG tablet TAKE ONE TABLET BY MOUTH DAILY 30 tablet 5   • fluticasone (FLONASE) 50 MCG/ACT nasal spray 2 sprays into the nostril(s) as directed by provider Daily.     • omeprazole (priLOSEC) 20 MG capsule Take 1 capsule by mouth Daily. 30 capsule 11   • pseudoephedrine (SUDAFED) 30 MG tablet Take 30 mg by mouth Every 4 (Four) Hours As Needed for Congestion.     • spironolactone (Aldactone) 25 MG tablet Take 1 tablet by mouth 2 (Two) Times a Day. 60 tablet 2     Current Facility-Administered Medications for the  9/1/20 encounter (Office Visit) with Isadora Miranda APRN   Medication Dose Route Frequency Provider Last Rate Last Dose   • HPV 9-Valent Recomb Vaccine suspension 1 each  1 each Intramuscular Take As Directed Joceline Lyn APRN   1 each at 07/16/19 1205   • HPV 9-Valent Recomb Vaccine suspension 1 syringe  1 syringe Intramuscular Take As Directed Joceline Lyn APRN   1 syringe at 10/17/19 1052     No Known Allergies  Social History     Socioeconomic History   • Marital status: Single     Spouse name: Not on file   • Number of children: Not on file   • Years of education: Not on file   • Highest education level: Not on file   Tobacco Use   • Smoking status: Never Smoker   • Smokeless tobacco: Never Used   Substance and Sexual Activity   • Alcohol use: No   • Drug use: No   • Sexual activity: Never     Birth control/protection: OCP     Family History   Problem Relation Age of Onset   • Diabetes Paternal Grandmother    • No Known Problems Father    • No Known Problems Mother    • No Known Problems Sister    • Breast cancer Neg Hx    • Ovarian cancer Neg Hx    • Colon cancer Neg Hx    • Colon polyps Neg Hx      Health Maintenance   Topic Date Due   • ANNUAL PHYSICAL  03/07/2001   • TDAP/TD VACCINES (1 - Tdap) 03/07/2009   • HEPATITIS C SCREENING  08/08/2018   • CHLAMYDIA SCREENING  08/08/2018   • HPV VACCINES (3 - Female 3-dose series) 02/17/2020   • INFLUENZA VACCINE  08/01/2020   • PAP SMEAR  03/22/2022   • MENINGOCOCCAL VACCINE (Normal Risk)  Aged Out     Review of Systems   Constitutional: Negative for activity change, appetite change, chills, diaphoresis, fatigue, fever and unexpected weight change.   HENT: Negative for ear pain, hearing loss, mouth sores, sore throat, trouble swallowing and voice change.    Eyes: Negative.    Respiratory: Negative for cough, choking, shortness of breath and wheezing.    Cardiovascular: Negative for chest pain and palpitations.   Gastrointestinal: Positive for  "abdominal pain and nausea. Negative for blood in stool, constipation, diarrhea and vomiting.   Endocrine: Negative for cold intolerance and heat intolerance.   Genitourinary: Negative for decreased urine volume, dysuria, frequency, hematuria and urgency.   Musculoskeletal: Negative for back pain, gait problem and myalgias.   Skin: Negative for color change, pallor and rash.   Allergic/Immunologic: Negative for food allergies and immunocompromised state.   Neurological: Negative for dizziness, tremors, seizures, syncope, weakness, light-headedness, numbness and headaches.   Hematological: Negative for adenopathy. Does not bruise/bleed easily.   Psychiatric/Behavioral: Negative for agitation and confusion. The patient is not nervous/anxious.    All other systems reviewed and are negative.    Objective   Vitals:    09/01/20 0947   BP: 132/84   Pulse: 83   Temp: 97.8 °F (36.6 °C)   SpO2: 98%   Weight: 70.8 kg (156 lb)   Height: 154.9 cm (61\")     Body mass index is 29.48 kg/m².  Physical Exam   Constitutional: She is oriented to person, place, and time. She appears well-developed and well-nourished.   HENT:   Head: Normocephalic and atraumatic.   Eyes: Pupils are equal, round, and reactive to light.   Neck: Normal range of motion. Neck supple. No tracheal deviation present.   Cardiovascular: Normal rate, regular rhythm and normal heart sounds. Exam reveals no gallop and no friction rub.   No murmur heard.  Pulmonary/Chest: Effort normal and breath sounds normal. No respiratory distress. She has no wheezes. She has no rales. She exhibits no tenderness.   Abdominal: Soft. Bowel sounds are normal. She exhibits no distension. There is no hepatosplenomegaly. There is no tenderness. There is no rigidity, no rebound and no guarding.   Musculoskeletal: Normal range of motion. She exhibits no edema, tenderness or deformity.   Neurological: She is alert and oriented to person, place, and time. She has normal reflexes.   Skin: " Skin is warm and dry. No rash noted. No pallor.   Psychiatric: She has a normal mood and affect. Her behavior is normal. Judgment and thought content normal.     Assessment/Plan   Wendy was seen today for gi problem.    Diagnoses and all orders for this visit:    Abnormal esophagram    Gastroparesis  -     Ambulatory Referral to Gastroenterology    Nausea    Early satiety    Nonsmoker    Obesity, unspecified obesity severity, unspecified obesity type    Recurrent/persistent symptoms of what is suspected to be related to poor gastric emptying/gastroparesis. Will refer her to specialist for further evaluation and other recommendations.     Part of this note may be an electronic transcription/translation of spoken language to printed text using the Dragon Dictation System.  Body mass index is 29.48 kg/m².      Patient's Body mass index is 29.48 kg/m². BMI is above normal parameters. Recommendations include: nutrition counseling.      All risks, benefits, alternatives, and indications of colonoscopy and/or Endoscopy procedure have been discussed with the patient. Risks to include perforation of the colon requiring possible surgery or colostomy, risk of bleeding from biopsies or removal of colon tissue, possibility of missing a colon polyp or cancer, or adverse drug reaction.  Benefits to include the diagnosis and management of disease of the colon and rectum. Alternatives to include barium enema, radiographic evaluation, lab testing or no intervention. Pt verbalizes understanding and agrees.     Isadora Miranda, APRN  9/4/2020  10:18      Obesity, Adult  Obesity is the condition of having too much total body fat. Being overweight or obese means that your weight is greater than what is considered healthy for your body size. Obesity is determined by a measurement called BMI. BMI is an estimate of body fat and is calculated from height and weight. For adults, a BMI of 30 or higher is considered obese.  Obesity can  eventually lead to other health concerns and major illnesses, including:  · Stroke.  · Coronary artery disease (CAD).  · Type 2 diabetes.  · Some types of cancer, including cancers of the colon, breast, uterus, and gallbladder.  · Osteoarthritis.  · High blood pressure (hypertension).  · High cholesterol.  · Sleep apnea.  · Gallbladder stones.  · Infertility problems.  What are the causes?  The main cause of obesity is taking in (consuming) more calories than your body uses for energy. Other factors that contribute to this condition may include:  · Being born with genes that make you more likely to become obese.  · Having a medical condition that causes obesity. These conditions include:  ¨ Hypothyroidism.  ¨ Polycystic ovarian syndrome (PCOS).  ¨ Binge-eating disorder.  ¨ Cushing syndrome.  · Taking certain medicines, such as steroids, antidepressants, and seizure medicines.  · Not being physically active (sedentary lifestyle).  · Living where there are limited places to exercise safely or buy healthy foods.  · Not getting enough sleep.  What increases the risk?  The following factors may increase your risk of this condition:  · Having a family history of obesity.  · Being a woman of -American descent.  · Being a man of  descent.  What are the signs or symptoms?  Having excessive body fat is the main symptom of this condition.  How is this diagnosed?  This condition may be diagnosed based on:  · Your symptoms.  · Your medical history.  · A physical exam. Your health care provider may measure:  ¨ Your BMI. If you are an adult with a BMI between 25 and less than 30, you are considered overweight. If you are an adult with a BMI of 30 or higher, you are considered obese.  ¨ The distances around your hips and your waist (circumferences). These may be compared to each other to help diagnose your condition.  ¨ Your skinfold thickness. Your health care provider may gently pinch a fold of your skin and  measure it.  How is this treated?  Treatment for this condition often includes changing your lifestyle. Treatment may include some or all of the following:  · Dietary changes. Work with your health care provider and a dietitian to set a weight-loss goal that is healthy and reasonable for you. Dietary changes may include eating:  ¨ Smaller portions. A portion size is the amount of a particular food that is healthy for you to eat at one time. This varies from person to person.  ¨ Low-calorie or low-fat options.  ¨ More whole grains, fruits, and vegetables.  · Regular physical activity. This may include aerobic activity (cardio) and strength training.  · Medicine to help you lose weight. Your health care provider may prescribe medicine if you are unable to lose 1 pound a week after 6 weeks of eating more healthily and doing more physical activity.  · Surgery. Surgical options may include gastric banding and gastric bypass. Surgery may be done if:  ¨ Other treatments have not helped to improve your condition.  ¨ You have a BMI of 40 or higher.  ¨ You have life-threatening health problems related to obesity.  Follow these instructions at home:     Eating and drinking     · Follow recommendations from your health care provider about what you eat and drink. Your health care provider may advise you to:  ¨ Limit fast foods, sweets, and processed snack foods.  ¨ Choose low-fat options, such as low-fat milk instead of whole milk.  ¨ Eat 5 or more servings of fruits or vegetables every day.  ¨ Eat at home more often. This gives you more control over what you eat.  ¨ Choose healthy foods when you eat out.  ¨ Learn what a healthy portion size is.  ¨ Keep low-fat snacks on hand.  ¨ Avoid sugary drinks, such as soda, fruit juice, iced tea sweetened with sugar, and flavored milk.  ¨ Eat a healthy breakfast.  · Drink enough water to keep your urine clear or pale yellow.  · Do not go without eating for long periods of time (do not  fast) or follow a fad diet. Fasting and fad diets can be unhealthy and even dangerous.  Physical Activity   · Exercise regularly, as told by your health care provider. Ask your health care provider what types of exercise are safe for you and how often you should exercise.  · Warm up and stretch before being active.  · Cool down and stretch after being active.  · Rest between periods of activity.  Lifestyle   · Limit the time that you spend in front of your TV, computer, or video game system.  · Find ways to reward yourself that do not involve food.  · Limit alcohol intake to no more than 1 drink a day for nonpregnant women and 2 drinks a day for men. One drink equals 12 oz of beer, 5 oz of wine, or 1½ oz of hard liquor.  General instructions   · Keep a weight loss journal to keep track of the food you eat and how much you exercise you get.  · Take over-the-counter and prescription medicines only as told by your health care provider.  · Take vitamins and supplements only as told by your health care provider.  · Consider joining a support group. Your health care provider may be able to recommend a support group.  · Keep all follow-up visits as told by your health care provider. This is important.  Contact a health care provider if:  · You are unable to meet your weight loss goal after 6 weeks of dietary and lifestyle changes.  This information is not intended to replace advice given to you by your health care provider. Make sure you discuss any questions you have with your health care provider.  Document Released: 01/25/2006 Document Revised: 05/22/2017 Document Reviewed: 10/05/2016  [employer] Interactive Patient Education © 2017 [employer]      If you smoke or use tobacco, 4 minutes reading provided  Steps to Quit Smoking  Smoking tobacco can be harmful to your health and can affect almost every organ in your body. Smoking puts you, and those around you, at risk for developing many serious chronic diseases.  Quitting smoking is difficult, but it is one of the best things that you can do for your health. It is never too late to quit.  What are the benefits of quitting smoking?  When you quit smoking, you lower your risk of developing serious diseases and conditions, such as:  · Lung cancer or lung disease, such as COPD.  · Heart disease.  · Stroke.  · Heart attack.  · Infertility.  · Osteoporosis and bone fractures.  Additionally, symptoms such as coughing, wheezing, and shortness of breath may get better when you quit. You may also find that you get sick less often because your body is stronger at fighting off colds and infections. If you are pregnant, quitting smoking can help to reduce your chances of having a baby of low birth weight.  How do I get ready to quit?  When you decide to quit smoking, create a plan to make sure that you are successful. Before you quit:  · Pick a date to quit. Set a date within the next two weeks to give you time to prepare.  · Write down the reasons why you are quitting. Keep this list in places where you will see it often, such as on your bathroom mirror or in your car or wallet.  · Identify the people, places, things, and activities that make you want to smoke (triggers) and avoid them. Make sure to take these actions:  ¨ Throw away all cigarettes at home, at work, and in your car.  ¨ Throw away smoking accessories, such as ashtrays and lighters.  ¨ Clean your car and make sure to empty the ashtray.  ¨ Clean your home, including curtains and carpets.  · Tell your family, friends, and coworkers that you are quitting. Support from your loved ones can make quitting easier.  · Talk with your health care provider about your options for quitting smoking.  · Find out what treatment options are covered by your health insurance.  What strategies can I use to quit smoking?  Talk with your healthcare provider about different strategies to quit smoking. Some strategies include:  · Quitting smoking  altogether instead of gradually lessening how much you smoke over a period of time. Research shows that quitting “cold turkey” is more successful than gradually quitting.  · Attending in-person counseling to help you build problem-solving skills. You are more likely to have success in quitting if you attend several counseling sessions. Even short sessions of 10 minutes can be effective.  · Finding resources and support systems that can help you to quit smoking and remain smoke-free after you quit. These resources are most helpful when you use them often. They can include:  ¨ Online chats with a counselor.  ¨ Telephone quitlines.  ¨ Printed self-help materials.  ¨ Support groups or group counseling.  ¨ Text messaging programs.  ¨ Mobile phone applications.  · Taking medicines to help you quit smoking. (If you are pregnant or breastfeeding, talk with your health care provider first.) Some medicines contain nicotine and some do not. Both types of medicines help with cravings, but the medicines that include nicotine help to relieve withdrawal symptoms. Your health care provider may recommend:  ¨ Nicotine patches, gum, or lozenges.  ¨ Nicotine inhalers or sprays.  ¨ Non-nicotine medicine that is taken by mouth.  Talk with your health care provider about combining strategies, such as taking medicines while you are also receiving in-person counseling. Using these two strategies together makes you more likely to succeed in quitting than if you used either strategy on its own.  If you are pregnant or breastfeeding, talk with your health care provider about finding counseling or other support strategies to quit smoking. Do not take medicine to help you quit smoking unless told to do so by your health care provider.  What things can I do to make it easier to quit?  Quitting smoking might feel overwhelming at first, but there is a lot that you can do to make it easier. Take these important actions:  · Reach out to your family  and friends and ask that they support and encourage you during this time. Call telephone quitlines, reach out to support groups, or work with a counselor for support.  · Ask people who smoke to avoid smoking around you.  · Avoid places that trigger you to smoke, such as bars, parties, or smoke-break areas at work.  · Spend time around people who do not smoke.  · Lessen stress in your life, because stress can be a smoking trigger for some people. To lessen stress, try:  ¨ Exercising regularly.  ¨ Deep-breathing exercises.  ¨ Yoga.  ¨ Meditating.  ¨ Performing a body scan. This involves closing your eyes, scanning your body from head to toe, and noticing which parts of your body are particularly tense. Purposefully relax the muscles in those areas.  · Download or purchase mobile phone or tablet apps (applications) that can help you stick to your quit plan by providing reminders, tips, and encouragement. There are many free apps, such as QuitGuide from the CDC (Centers for Disease Control and Prevention). You can find other support for quitting smoking (smoking cessation) through smokefree.gov and other websites.  How will I feel when I quit smoking?  Within the first 24 hours of quitting smoking, you may start to feel some withdrawal symptoms. These symptoms are usually most noticeable 2-3 days after quitting, but they usually do not last beyond 2-3 weeks. Changes or symptoms that you might experience include:  · Mood swings.  · Restlessness, anxiety, or irritation.  · Difficulty concentrating.  · Dizziness.  · Strong cravings for sugary foods in addition to nicotine.  · Mild weight gain.  · Constipation.  · Nausea.  · Coughing or a sore throat.  · Changes in how your medicines work in your body.  · A depressed mood.  · Difficulty sleeping (insomnia).  After the first 2-3 weeks of quitting, you may start to notice more positive results, such as:  · Improved sense of smell and taste.  · Decreased coughing and sore  throat.  · Slower heart rate.  · Lower blood pressure.  · Clearer skin.  · The ability to breathe more easily.  · Fewer sick days.  Quitting smoking is very challenging for most people. Do not get discouraged if you are not successful the first time. Some people need to make many attempts to quit before they achieve long-term success. Do your best to stick to your quit plan, and talk with your health care provider if you have any questions or concerns.  This information is not intended to replace advice given to you by your health care provider. Make sure you discuss any questions you have with your health care provider.  Document Released: 12/12/2002 Document Revised: 08/15/2017 Document Reviewed: 05/03/2016  [employer] Interactive Patient Education © 2017 [employer]

## 2020-10-12 ENCOUNTER — TELEPHONE (OUTPATIENT)
Dept: FAMILY MEDICINE CLINIC | Facility: CLINIC | Age: 20
End: 2020-10-12

## 2020-10-12 ENCOUNTER — RESULTS ENCOUNTER (OUTPATIENT)
Dept: FAMILY MEDICINE CLINIC | Facility: CLINIC | Age: 20
End: 2020-10-12

## 2020-10-12 DIAGNOSIS — Z20.822 EXPOSURE TO COVID-19 VIRUS: ICD-10-CM

## 2020-10-12 DIAGNOSIS — Z20.822 EXPOSURE TO COVID-19 VIRUS: Primary | ICD-10-CM

## 2020-10-12 NOTE — TELEPHONE ENCOUNTER
Pt was exposed Satuday to covid positve person. Requesting order to [facility] for testing. Order entered and faxed.

## 2020-10-13 ENCOUNTER — TELEPHONE (OUTPATIENT)
Dept: GASTROENTEROLOGY | Facility: CLINIC | Age: 20
End: 2020-10-13

## 2020-10-13 DIAGNOSIS — R79.89 ELEVATED TESTOSTERONE LEVEL: ICD-10-CM

## 2020-10-13 RX ORDER — SPIRONOLACTONE 25 MG/1
TABLET ORAL
Qty: 60 TABLET | Refills: 1 | OUTPATIENT
Start: 2020-10-13

## 2020-10-13 NOTE — TELEPHONE ENCOUNTER
----- Message from Wendy Newby sent at 10/13/2020 12:41 PM CDT -----  Regarding: Prescription Question  Contact: [phone]  Ольга Muir,     So I saw Dr Lopez in [place] and his opinion is that I don't have Gastroparesis and instead have functional dypepsia. He wants me to to Bentyl to see if that can help. He also wants to do a 4 hour emptying test as he said that my 2 hour test looked normal to him.  I wanted to know what your opinion is of taking Bentyl and of this diagnosis.     Thanks  Wendy Newby

## 2020-10-14 ENCOUNTER — TELEPHONE (OUTPATIENT)
Dept: FAMILY MEDICINE CLINIC | Facility: CLINIC | Age: 20
End: 2020-10-14

## 2020-10-14 NOTE — TELEPHONE ENCOUNTER
Discharge Note - Hand Therapy      Current Date:  11/9/2017    Initial Evaluation Date:  10/23/17  Reporting period is  to 11/9/2017  Number of Visits:  2    Current Date:  11/8/17     Diagnosis:  Right  thumb pain    M18.11 Arthritis of carpometacarpal (CMC) joint of right thumb  Date of hand therapy orders: 10/19/17  Procedure:  CMC R thumb injection on 10/19/17  Referring MD: Lucina Ramirez MD   Next MD visit: January 2018    Subjective:    I haven't had any pain at night. I do think it has gotten better. I think I can manage the symptoms better.    Imaging per chart 10/20/17:  XR R wrist demonstrates R thumb CMC>STT arthritis    Occupational Profile Information:  Current occupation is retired (worked as an RN in pediatrics)  Daily Tasks: prolonged sitting, operating a machine/assembly, prolonged standing, driving, lifting/carrying, other sewing, cooking / cutting.  Prior functional level:  no limitations  Barriers include:none  Mobility: No difficulty  Transportation: drives  Leisure activities/hobbies: Pt enjoys sewing and reports it is difficult to  a needle. She would like to resume playign the flute, piano, ceramics. She likes cooking and baking including kneading dough.  Other: Pt notes she has difficulty using scissors and would like this to be easier. She would live to be able to shovel this winter.    Functional Outcome Measure:  Please refer to flow sheet.      Objective:  Pain Level Report  VAS(0-10) 10/23/2017 11/8/17   At Rest: 0 0   With Use: 3-5/10 2-3     Report of Pain:  Location:  Thumb of R hand, CMCj  Pain Quality:  Aching  Frequency: intermittent    Pain is worst:  daytime or nighttime  Exacerbated by:  Key pinch motion  Relieved by:  rest  Progression:  Improving since injection  ROM:  Pain Report:  - none    + mild    ++ moderate    +++ severe   Thumb 10/23/2017    AROM(PROM) R L   MP  62  60   IP  62 77    RAbd       PAbd       Retropulsion (0-4)       Kapandji Opposition Scale  covid neg    (0-10/10) 10   10       Thumb Observation/Appearance:  Key: + = present/ - = not observed    10/23/2017   Tenderness at CMC R:  tends to be sore to volar CMC, just proximal to CMC of thumb (not today, 5 days post injection  L:      Provocative Tests: deferred, please refer to chart for recent MD diagnosis and imaging.    Thumb ROM IMD  Intermetacarpal Distance Measure in mm-digital caliper  10/23/2017  Note: Pt is 5 days s/p injection to R thumb CMC Rabd PABD    RIGHT LEFT RIGHT LEFT   IMD RATER #1 65.87 59.84 66.83 59.58   IMD RATER #2 65.49 59.90 67.39 61.75   PAIN (NRS) #1 0 0 0 0   PAIN (NRS) #2 0 0 0 0       Strength (Pain free):   (Measured in pounds)  Pain Report:  - none    + mild    ++ moderate    +++ severe    10/23/2017    Trials R L   1   69 83     Lat Pinch 10/23/2017    Trials R L   1   16 17     3 Pt Pinch 10/23/2017    Trials R L   1 13 12.5     Assessment:  Response to therapy has been improvement to:  Pain:  frequency is less, duration of pain is decreased and less tender over affected area  Appropriateness of Rx I have re-evaluated this patient and find that the nature, scope, duration and intensity of the therapy is appropriate for the medical condition of the patient.  Overall Assessment:  Patient is progressing well.  Patient is ready to be discharged from therapy and continue their home treatment program.  Patient would benefit from further evaluation of:  Thumb CMC  STG/LTG:  See goal sheet for details and updates.    Home Program:    Thumb webspace stretch after clip use  1st DI with RB  C for opposition with RB  Adaptive equipment training and trialed in clinic

## 2020-10-26 ENCOUNTER — TELEPHONE (OUTPATIENT)
Dept: FAMILY MEDICINE CLINIC | Facility: CLINIC | Age: 20
End: 2020-10-26

## 2020-10-26 DIAGNOSIS — R63.4 WEIGHT LOSS: ICD-10-CM

## 2020-10-26 DIAGNOSIS — R68.81 EARLY SATIETY: Primary | ICD-10-CM

## 2020-10-26 DIAGNOSIS — R10.84 GENERALIZED ABDOMINAL PAIN: ICD-10-CM

## 2020-10-26 NOTE — TELEPHONE ENCOUNTER
----- Message from Muriel Pappas MA sent at 10/26/2020  8:46 AM CDT -----  Regarding: FW: Referral Request  Contact: [phone]    ----- Message -----  From: Wendy Newby  Sent: 10/25/2020   1:14 PM CDT  To: [facility]  Subject: Referral Request                                 Veronika Wheeler,    I have been fighting quite a bit a nausea, stomach pain, stomach cramps, belching, early satiety, and diarrhea.   I have lost about 10 pounds from not being able to eat, as well as a lot of fatigue. I have seen a Dr Yann Lopez at [place], but have not been satisfied being under his care.     Do you have any contacts or recommendations for another GI? They don't have to be in [place], I'm willing go anywhere in the [place] for care.     Thanks  Wendy Newby    What about [facility]?

## 2020-11-16 DIAGNOSIS — R10.13 DYSPEPSIA: ICD-10-CM

## 2020-11-16 RX ORDER — OMEPRAZOLE 20 MG/1
CAPSULE, DELAYED RELEASE ORAL
Qty: 30 CAPSULE | Refills: 5 | Status: SHIPPED | OUTPATIENT
Start: 2020-11-16 | End: 2021-04-13 | Stop reason: SDUPTHER

## 2020-11-19 ENCOUNTER — TELEPHONE (OUTPATIENT)
Dept: FAMILY MEDICINE CLINIC | Facility: CLINIC | Age: 20
End: 2020-11-19

## 2020-11-19 DIAGNOSIS — Z20.822 EXPOSURE TO COVID-19 VIRUS: Primary | ICD-10-CM

## 2020-12-19 ENCOUNTER — RESULTS ENCOUNTER (OUTPATIENT)
Dept: FAMILY MEDICINE CLINIC | Facility: CLINIC | Age: 20
End: 2020-12-19

## 2020-12-19 DIAGNOSIS — Z20.822 EXPOSURE TO COVID-19 VIRUS: ICD-10-CM

## 2021-01-01 NOTE — FLOWSHEET NOTE
10/12/17 1943   Encounter Summary   Services provided to: Patient and family together   Referral/Consult From: Rounding  (Family approached me while still in waiting room.)   Support System Parent; Family members;Friends/neighbors; [employer]/la[employer] community   Complexity of Encounter Moderate   Length of Encounter 30 minutes   Routine   Type Initial   Assessment Calm; Approachable   Intervention Active listening;Nurtured hope   Outcome Comfort;Expressed gratitude 1% lidocaine

## 2021-02-02 DIAGNOSIS — R79.89 ELEVATED TESTOSTERONE LEVEL: ICD-10-CM

## 2021-02-05 RX ORDER — SPIRONOLACTONE 25 MG/1
TABLET ORAL
Qty: 60 TABLET | Refills: 1 | Status: SHIPPED | OUTPATIENT
Start: 2021-02-05 | End: 2021-04-08 | Stop reason: SDUPTHER

## 2021-02-22 RX ORDER — ESCITALOPRAM OXALATE 10 MG/1
10 TABLET ORAL DAILY
Qty: 30 TABLET | Refills: 0 | Status: SHIPPED | OUTPATIENT
Start: 2021-02-22 | End: 2021-03-22

## 2021-02-22 NOTE — TELEPHONE ENCOUNTER
Requested Prescriptions     Pending Prescriptions Disp Refills   • escitalopram (LEXAPRO) 10 MG tablet [Pharmacy Med Name: ESCITALOPRAM 10 MG TABLET] 30 tablet 0     Sig: Take 1 tablet by mouth Daily. CALL OFFICE FOR APPOINTMENT

## 2021-03-19 PROBLEM — R14.2 BELCHING: Status: ACTIVE | Noted: 2020-12-07

## 2021-03-22 RX ORDER — ESCITALOPRAM OXALATE 10 MG/1
10 TABLET ORAL DAILY
Qty: 30 TABLET | Refills: 0 | Status: SHIPPED | OUTPATIENT
Start: 2021-03-22 | End: 2021-04-20

## 2021-03-26 ENCOUNTER — TELEPHONE (OUTPATIENT)
Dept: VASCULAR SURGERY | Facility: CLINIC | Age: 21
End: 2021-03-26

## 2021-04-01 ENCOUNTER — TELEPHONE (OUTPATIENT)
Dept: PODIATRY | Facility: CLINIC | Age: 21
End: 2021-04-01

## 2021-04-01 NOTE — TELEPHONE ENCOUNTER
Left message for patient regarding appointment with [facility] on 4/2. Also informed patient if they had any questions or needed to reschedule to call the office at [phone].

## 2021-04-02 ENCOUNTER — OFFICE VISIT (OUTPATIENT)
Dept: OBSTETRICS AND GYNECOLOGY | Facility: CLINIC | Age: 21
End: 2021-04-02

## 2021-04-02 ENCOUNTER — OFFICE VISIT (OUTPATIENT)
Dept: PODIATRY | Facility: CLINIC | Age: 21
End: 2021-04-02

## 2021-04-02 VITALS
HEIGHT: 61 IN | WEIGHT: 164 LBS | SYSTOLIC BLOOD PRESSURE: 128 MMHG | BODY MASS INDEX: 30.96 KG/M2 | DIASTOLIC BLOOD PRESSURE: 72 MMHG

## 2021-04-02 VITALS
BODY MASS INDEX: 31.15 KG/M2 | WEIGHT: 165 LBS | HEIGHT: 61 IN | HEART RATE: 83 BPM | DIASTOLIC BLOOD PRESSURE: 68 MMHG | OXYGEN SATURATION: 97 % | SYSTOLIC BLOOD PRESSURE: 124 MMHG

## 2021-04-02 DIAGNOSIS — M79.672 FOOT PAIN, LEFT: ICD-10-CM

## 2021-04-02 DIAGNOSIS — L60.1 ONYCHOLYSIS OF TOENAIL: Primary | ICD-10-CM

## 2021-04-02 DIAGNOSIS — R79.89 ELEVATED TESTOSTERONE LEVEL: ICD-10-CM

## 2021-04-02 PROCEDURE — 99213 OFFICE O/P EST LOW 20 MIN: CPT | Performed by: NURSE PRACTITIONER

## 2021-04-02 PROCEDURE — 11720 DEBRIDE NAIL 1-5: CPT | Performed by: NURSE PRACTITIONER

## 2021-04-02 RX ORDER — SPIRONOLACTONE 25 MG/1
25 TABLET ORAL 2 TIMES DAILY
Qty: 60 TABLET | Refills: 2 | Status: CANCELLED | OUTPATIENT
Start: 2021-04-02

## 2021-04-02 NOTE — PROGRESS NOTES
Subjective   Wendy Newby is a 21 y.o. female  YOB: 2000      Chief Complaint   Patient presents with   • Polycystic Ovary Syndrome     Patient here to follow up on PCOS. Last labs done 01/2020. Patient has been taking Aldactone 25mg and has been doing well with it. Patient needs refills sent in.        Patient here for follow-up of PCOS/elevated testosterone.      The following portions of the patient's history were reviewed and updated as appropriate: allergies, current medications, past family history, past medical history, past social history, past surgical history and problem list.    No Known Allergies    Past Medical History:   Diagnosis Date   • Allergic     seasonal allergies   • PCOS (polycystic ovarian syndrome)    • PCOS (polycystic ovarian syndrome)    • Vertigo     r/t to severe allergy symptoms       Family History   Problem Relation Age of Onset   • Diabetes Paternal Grandmother    • No Known Problems Father    • No Known Problems Mother    • No Known Problems Sister    • Breast cancer Neg Hx    • Ovarian cancer Neg Hx    • Colon cancer Neg Hx    • Colon polyps Neg Hx        Social History     Socioeconomic History   • Marital status: Single     Spouse name: Not on file   • Number of children: Not on file   • Years of education: Not on file   • Highest education level: Not on file   Tobacco Use   • Smoking status: Never Smoker   • Smokeless tobacco: Never Used   Vaping Use   • Vaping Use: Never used   Substance and Sexual Activity   • Alcohol use: No   • Drug use: No   • Sexual activity: Never     Birth control/protection: OCP         Current Outpatient Medications:   •  cetirizine (zyrTEC) 10 MG tablet, Take 10 mg by mouth Daily As Needed for allergies., Disp: , Rfl:   •  escitalopram (LEXAPRO) 10 MG tablet, Take 1 tablet by mouth Daily. CALL OFFICE FOR APPOINTMENT, Disp: 30 tablet, Rfl: 0  •  omeprazole (priLOSEC) 20 MG capsule, TAKE ONE CAPSULE BY MOUTH DAILY, Disp: 30 capsule,  Rfl: 5  •  ondansetron (ZOFRAN) 4 MG tablet, Take 4 mg by mouth., Disp: , Rfl:   •  spironolactone (ALDACTONE) 25 MG tablet, TAKE ONE TABLET BY MOUTH TWICE A DAY, Disp: 60 tablet, Rfl: 1    Patient's last menstrual period was 03/17/2021.    Sexual History:         Could not be calculated    Past Surgical History:   Procedure Laterality Date   • ENDOSCOPY N/A 11/14/2019    Normal exam   • NO PAST SURGERIES         Review of Systems   Constitutional: Negative for activity change, appetite change, chills, diaphoresis, fatigue, fever and unexpected weight change.   HENT: Negative for congestion, dental problem, drooling, ear discharge, ear pain, facial swelling, hearing loss, mouth sores, nosebleeds, postnasal drip, rhinorrhea, sinus pressure, sinus pain, sneezing, sore throat, tinnitus, trouble swallowing and voice change.    Eyes: Negative for photophobia, pain, discharge, redness, itching and visual disturbance.   Respiratory: Negative for apnea, cough, choking, chest tightness, shortness of breath, wheezing and stridor.    Cardiovascular: Negative for chest pain, palpitations and leg swelling.   Gastrointestinal: Negative for abdominal distention, abdominal pain, anal bleeding, blood in stool, constipation, diarrhea, nausea, rectal pain and vomiting.   Endocrine: Negative for cold intolerance, heat intolerance, polydipsia, polyphagia and polyuria.   Genitourinary: Negative for decreased urine volume, difficulty urinating, dyspareunia, dysuria, enuresis, flank pain, frequency, genital sores, hematuria, menstrual problem, pelvic pain, urgency, vaginal bleeding, vaginal discharge and vaginal pain.   Musculoskeletal: Negative for arthralgias, back pain, gait problem, joint swelling, myalgias, neck pain and neck stiffness.   Skin: Negative for color change, pallor, rash and wound.   Allergic/Immunologic: Negative for environmental allergies, food allergies and immunocompromised state.   Neurological: Negative for  "dizziness, tremors, seizures, syncope, facial asymmetry, speech difficulty, weakness, light-headedness, numbness and headaches.   Hematological: Negative for adenopathy. Does not bruise/bleed easily.   Psychiatric/Behavioral: Negative for agitation, behavioral problems, confusion, decreased concentration, dysphoric mood, hallucinations, self-injury, sleep disturbance and suicidal ideas. The patient is not nervous/anxious and is not hyperactive.        Objective   Physical Exam  Vitals and nursing note reviewed.   Constitutional:       Appearance: She is well-developed.   HENT:      Head: Normocephalic.   Eyes:      Pupils: Pupils are equal, round, and reactive to light.   Cardiovascular:      Rate and Rhythm: Normal rate and regular rhythm.   Pulmonary:      Effort: Pulmonary effort is normal.      Breath sounds: Normal breath sounds.   Abdominal:      Palpations: Abdomen is soft.   Musculoskeletal:         General: Normal range of motion.      Cervical back: Normal range of motion.   Skin:     General: Skin is warm and dry.   Neurological:      Mental Status: She is alert and oriented to person, place, and time.   Psychiatric:         Behavior: Behavior normal.           Vitals:    04/02/21 1450   BP: 128/72   Weight: 74.4 kg (164 lb)   Height: 154.9 cm (61\")       Diagnoses and all orders for this visit:    1. Elevated testosterone level  Comments:  Patient is currently on spironolactone 25 mg twice daily.  To repeat fasting insulin and testosterone but patient is not fasting today.  Labs ordered-to come back when fasting.  Orders:  -     Insulin, Total  -     Testosterone    Other orders  -     Cancel: spironolactone (ALDACTONE) 25 MG tablet; Take 1 tablet by mouth 2 (Two) Times a Day.  Dispense: 60 tablet; Refill: 2          Patient's Body mass index is 30.99 kg/m². BMI is above normal parameters. Recommendations include: exercise counseling and nutrition counseling.             Non-Smoker    MyChart " Instructions Given

## 2021-04-06 LAB
INSULIN SERPL-ACNC: 13.9 UIU/ML (ref 2.6–24.9)
TESTOST SERPL-MCNC: 44 NG/DL (ref 8–48)

## 2021-04-08 ENCOUNTER — PATIENT MESSAGE (OUTPATIENT)
Dept: OBSTETRICS AND GYNECOLOGY | Facility: CLINIC | Age: 21
End: 2021-04-08

## 2021-04-08 DIAGNOSIS — R79.89 ELEVATED TESTOSTERONE LEVEL: ICD-10-CM

## 2021-04-08 RX ORDER — SPIRONOLACTONE 25 MG/1
25 TABLET ORAL 2 TIMES DAILY
Qty: 60 TABLET | Refills: 2 | Status: SHIPPED | OUTPATIENT
Start: 2021-04-08 | End: 2021-04-19 | Stop reason: SDUPTHER

## 2021-04-08 NOTE — TELEPHONE ENCOUNTER
From: Wendy Newby  To: CHARLES Herrera  Sent: 4/8/2021 12:45 PM CDT  Subject: Test Results Question    Veronika Car,     I just saw my test results came back within the normal range, does that mean I can continue the spironolactone at the dosage I'm currently on? If so, could you update the prescription so I can get it filled?       Thanks,   Wendy

## 2021-04-11 DIAGNOSIS — R79.89 ELEVATED TESTOSTERONE LEVEL: ICD-10-CM

## 2021-04-12 RX ORDER — SPIRONOLACTONE 25 MG/1
TABLET ORAL
Qty: 60 TABLET | Refills: 0 | OUTPATIENT
Start: 2021-04-12

## 2021-04-13 ENCOUNTER — OFFICE VISIT (OUTPATIENT)
Dept: FAMILY MEDICINE CLINIC | Facility: CLINIC | Age: 21
End: 2021-04-13

## 2021-04-13 VITALS
HEIGHT: 61 IN | DIASTOLIC BLOOD PRESSURE: 80 MMHG | RESPIRATION RATE: 16 BRPM | BODY MASS INDEX: 31.34 KG/M2 | OXYGEN SATURATION: 99 % | SYSTOLIC BLOOD PRESSURE: 116 MMHG | HEART RATE: 79 BPM | WEIGHT: 166 LBS

## 2021-04-13 DIAGNOSIS — R00.2 PALPITATIONS: ICD-10-CM

## 2021-04-13 DIAGNOSIS — K31.84 GASTROPARESIS: Primary | ICD-10-CM

## 2021-04-13 DIAGNOSIS — R79.89 ELEVATED TESTOSTERONE LEVEL: ICD-10-CM

## 2021-04-13 PROCEDURE — 99213 OFFICE O/P EST LOW 20 MIN: CPT | Performed by: FAMILY MEDICINE

## 2021-04-13 RX ORDER — OMEPRAZOLE 40 MG/1
40 CAPSULE, DELAYED RELEASE ORAL 2 TIMES DAILY
COMMUNITY

## 2021-04-13 RX ORDER — SPIRONOLACTONE 25 MG/1
25 TABLET ORAL 2 TIMES DAILY
Qty: 60 TABLET | Refills: 2 | OUTPATIENT
Start: 2021-04-13

## 2021-04-13 RX ORDER — METOCLOPRAMIDE 5 MG/1
5 TABLET ORAL 3 TIMES DAILY
COMMUNITY
Start: 2021-04-06 | End: 2021-05-07

## 2021-04-13 NOTE — PROGRESS NOTES
"Subjective   Wendy Newby is a 21 y.o. female.     Chief Complaint   Patient presents with   • Dizziness     heart palpitation & lightheadedness      x 8 mo       History of Present Illness     her gastroenterologist has dx her with gastroparesis---she also n otes having palpitations for several mos--occ eipisodes of near syncope      Current Outpatient Medications:   •  cetirizine (zyrTEC) 10 MG tablet, Take 10 mg by mouth Daily As Needed for allergies., Disp: , Rfl:   •  escitalopram (LEXAPRO) 10 MG tablet, Take 1 tablet by mouth Daily. CALL OFFICE FOR APPOINTMENT, Disp: 30 tablet, Rfl: 0  •  metoclopramide (REGLAN) 5 MG tablet, Take 5 mg by mouth 3 (Three) Times a Day., Disp: , Rfl:   •  omeprazole (priLOSEC) 40 MG capsule, Take 40 mg by mouth 2 (two) times a day., Disp: , Rfl:   •  ondansetron (ZOFRAN) 4 MG tablet, Take 4 mg by mouth., Disp: , Rfl:   •  spironolactone (ALDACTONE) 25 MG tablet, Take 1 tablet by mouth 2 (Two) Times a Day., Disp: 60 tablet, Rfl: 2  No Known Allergies    Past Medical History:   Diagnosis Date   • Allergic     seasonal allergies   • PCOS (polycystic ovarian syndrome)    • PCOS (polycystic ovarian syndrome)    • Vertigo     r/t to severe allergy symptoms     Past Surgical History:   Procedure Laterality Date   • ENDOSCOPY N/A 11/14/2019    Normal exam   • NO PAST SURGERIES         Review of Systems   Constitutional: Negative.    HENT: Negative.    Eyes: Negative.    Respiratory: Negative.    Cardiovascular: Positive for palpitations.   Gastrointestinal: Negative.    Endocrine: Negative.    Genitourinary: Negative.    Musculoskeletal: Negative.    Skin: Negative.    Allergic/Immunologic: Negative.    Neurological: Negative.    Hematological: Negative.    Psychiatric/Behavioral: Negative.        Objective  /80   Pulse 79   Resp 16   Ht 154.9 cm (61\")   Wt 75.3 kg (166 lb)   LMP 03/17/2021   SpO2 99%   BMI 31.37 kg/m²   Physical Exam  Vitals and nursing note reviewed. "   Constitutional:       Appearance: Normal appearance.   HENT:      Head: Normocephalic and atraumatic.      Mouth/Throat:      Mouth: Mucous membranes are moist.      Pharynx: Oropharynx is clear.   Eyes:      Conjunctiva/sclera: Conjunctivae normal.      Pupils: Pupils are equal, round, and reactive to light.   Cardiovascular:      Rate and Rhythm: Normal rate and regular rhythm.      Pulses: Normal pulses.   Pulmonary:      Effort: Pulmonary effort is normal.   Abdominal:      General: Abdomen is flat. Bowel sounds are normal.      Palpations: Abdomen is soft.   Musculoskeletal:         General: Normal range of motion.      Cervical back: Normal range of motion and neck supple.   Skin:     General: Skin is warm and dry.      Capillary Refill: Capillary refill takes less than 2 seconds.   Neurological:      General: No focal deficit present.      Mental Status: She is alert and oriented to person, place, and time. Mental status is at baseline.   Psychiatric:         Mood and Affect: Mood normal.         Behavior: Behavior normal.         Thought Content: Thought content normal.         Judgment: Judgment normal.         Assessment/Plan   Diagnoses and all orders for this visit:    1. Gastroparesis (Primary)    2. Palpitations  -     CBC & Differential  -     Comprehensive metabolic panel  -     TSH  -     T4, Free  -     Ambulatory Referral to Cardiology                 Orders Placed This Encounter   Procedures   • Comprehensive metabolic panel     Order Specific Question:   Release to patient     Answer:   Immediate   • TSH     Order Specific Question:   Release to patient     Answer:   Immediate   • T4, Free     Order Specific Question:   Release to patient     Answer:   Immediate   • Ambulatory Referral to Cardiology     Referral Priority:   Routine     Referral Type:   Consultation     Referral Reason:   Specialty Services Required     Requested Specialty:   Cardiology     Number of Visits Requested:   1   •  CBC & Differential       Follow up: 6 month(s)

## 2021-04-14 ENCOUNTER — TELEPHONE (OUTPATIENT)
Dept: FAMILY MEDICINE CLINIC | Facility: CLINIC | Age: 21
End: 2021-04-14

## 2021-04-14 LAB
ALBUMIN SERPL-MCNC: 4.4 G/DL (ref 3.5–5.2)
ALBUMIN/GLOB SERPL: 1.6 G/DL
ALP SERPL-CCNC: 99 U/L (ref 39–117)
ALT SERPL-CCNC: 14 U/L (ref 1–33)
AST SERPL-CCNC: 26 U/L (ref 1–32)
BASOPHILS # BLD AUTO: 0.03 10*3/MM3 (ref 0–0.2)
BASOPHILS NFR BLD AUTO: 0.6 % (ref 0–1.5)
BILIRUB SERPL-MCNC: 0.6 MG/DL (ref 0–1.2)
BUN SERPL-MCNC: 9 MG/DL (ref 6–20)
BUN/CREAT SERPL: 10.3 (ref 7–25)
CALCIUM SERPL-MCNC: 9.9 MG/DL (ref 8.6–10.5)
CHLORIDE SERPL-SCNC: 102 MMOL/L (ref 98–107)
CO2 SERPL-SCNC: 28.6 MMOL/L (ref 22–29)
CREAT SERPL-MCNC: 0.87 MG/DL (ref 0.57–1)
EOSINOPHIL # BLD AUTO: 0.1 10*3/MM3 (ref 0–0.4)
EOSINOPHIL NFR BLD AUTO: 2 % (ref 0.3–6.2)
ERYTHROCYTE [DISTWIDTH] IN BLOOD BY AUTOMATED COUNT: 12.8 % (ref 12.3–15.4)
GLOBULIN SER CALC-MCNC: 2.7 GM/DL
GLUCOSE SERPL-MCNC: 91 MG/DL (ref 65–99)
HCT VFR BLD AUTO: 39.4 % (ref 34–46.6)
HGB BLD-MCNC: 13.4 G/DL (ref 12–15.9)
IMM GRANULOCYTES # BLD AUTO: 0.03 10*3/MM3 (ref 0–0.05)
IMM GRANULOCYTES NFR BLD AUTO: 0.6 % (ref 0–0.5)
LYMPHOCYTES # BLD AUTO: 1.2 10*3/MM3 (ref 0.7–3.1)
LYMPHOCYTES NFR BLD AUTO: 23.7 % (ref 19.6–45.3)
MCH RBC QN AUTO: 30 PG (ref 26.6–33)
MCHC RBC AUTO-ENTMCNC: 34 G/DL (ref 31.5–35.7)
MCV RBC AUTO: 88.3 FL (ref 79–97)
MONOCYTES # BLD AUTO: 0.44 10*3/MM3 (ref 0.1–0.9)
MONOCYTES NFR BLD AUTO: 8.7 % (ref 5–12)
NEUTROPHILS # BLD AUTO: 3.27 10*3/MM3 (ref 1.7–7)
NEUTROPHILS NFR BLD AUTO: 64.4 % (ref 42.7–76)
NRBC BLD AUTO-RTO: 0 /100 WBC (ref 0–0.2)
PLATELET # BLD AUTO: 307 10*3/MM3 (ref 140–450)
POTASSIUM SERPL-SCNC: 4.5 MMOL/L (ref 3.5–5.2)
PROT SERPL-MCNC: 7.1 G/DL (ref 6–8.5)
RBC # BLD AUTO: 4.46 10*6/MM3 (ref 3.77–5.28)
SODIUM SERPL-SCNC: 139 MMOL/L (ref 136–145)
T4 FREE SERPL-MCNC: 1.13 NG/DL (ref 0.93–1.7)
TSH SERPL DL<=0.005 MIU/L-ACNC: 2.01 UIU/ML (ref 0.27–4.2)
WBC # BLD AUTO: 5.07 10*3/MM3 (ref 3.4–10.8)

## 2021-04-14 NOTE — TELEPHONE ENCOUNTER
Caller: Wendy Newby    Relationship to patient: Self    Best call back number: [phone]     Patient is needing: CARDIOLOGY REFERRAL SWITCHED TO DR. NYE AT [facility].

## 2021-04-15 RX ORDER — OMEPRAZOLE 40 MG/1
40 CAPSULE, DELAYED RELEASE ORAL 2 TIMES DAILY
COMMUNITY
Start: 2020-12-07 | End: 2022-10-18

## 2021-04-15 RX ORDER — METOCLOPRAMIDE 5 MG/1
5 TABLET ORAL 3 TIMES DAILY
COMMUNITY
Start: 2021-04-06 | End: 2022-10-18 | Stop reason: ALTCHOICE

## 2021-04-15 RX ORDER — ONDANSETRON 4 MG/1
4 TABLET, FILM COATED ORAL EVERY 8 HOURS PRN
COMMUNITY
Start: 2021-04-05

## 2021-04-15 RX ORDER — ESCITALOPRAM OXALATE 10 MG/1
10 TABLET ORAL DAILY
COMMUNITY
Start: 2020-09-12

## 2021-04-15 RX ORDER — SPIRONOLACTONE 25 MG/1
25 TABLET ORAL DAILY
COMMUNITY
Start: 2020-09-12

## 2021-04-19 DIAGNOSIS — R79.89 ELEVATED TESTOSTERONE LEVEL: ICD-10-CM

## 2021-04-19 RX ORDER — SPIRONOLACTONE 25 MG/1
25 TABLET ORAL 2 TIMES DAILY
Qty: 60 TABLET | Refills: 2 | Status: SHIPPED | OUTPATIENT
Start: 2021-04-19 | End: 2021-11-22

## 2021-04-20 RX ORDER — ESCITALOPRAM OXALATE 10 MG/1
TABLET ORAL
Qty: 30 TABLET | Refills: 0 | Status: SHIPPED | OUTPATIENT
Start: 2021-04-20 | End: 2021-05-24

## 2021-05-05 ENCOUNTER — TELEPHONE (OUTPATIENT)
Dept: FAMILY MEDICINE CLINIC | Facility: CLINIC | Age: 21
End: 2021-05-05

## 2021-05-05 NOTE — TELEPHONE ENCOUNTER
----- Message from Muriel Pappas MA sent at 5/5/2021  9:08 AM CDT -----  Regarding: FW: Visit Follow-Up Question  Contact: [phone]    ----- Message -----  From: Wendy Newby  Sent: 5/3/2021   9:04 AM CDT  To: [facility]  Subject: Visit Follow-Up Question                         Veronika Wheeler,    I have a few questions for you. Lately I have noticed that I have gained some weight rather than losing it. I can't eat a lot of foods so my calorie count has only been around 850 on a good day. Do you have any ideas why this is happening? Could it be my body just holding on to anything it can? Or possibly PCOS related?     I've also noticed that I've been having to sleep a lot. I need about 10-12 hours to be able to function during the day, and I could still take a nap after that. Could this also be due to lack of proper food?     Thanks,   Wendy    I think we need to considre pcos in the differential of things to cauase the distribution of weight

## 2021-05-13 ENCOUNTER — OFFICE VISIT (OUTPATIENT)
Dept: CARDIOLOGY CLINIC | Age: 21
End: 2021-05-13
Payer: MEDICARE

## 2021-05-13 VITALS
WEIGHT: 178 LBS | SYSTOLIC BLOOD PRESSURE: 112 MMHG | HEART RATE: 81 BPM | BODY MASS INDEX: 33.61 KG/M2 | HEIGHT: 61 IN | DIASTOLIC BLOOD PRESSURE: 84 MMHG

## 2021-05-13 DIAGNOSIS — R00.2 PALPITATIONS: Primary | ICD-10-CM

## 2021-05-13 PROCEDURE — 93000 ELECTROCARDIOGRAM COMPLETE: CPT | Performed by: INTERNAL MEDICINE

## 2021-05-13 PROCEDURE — 99204 OFFICE O/P NEW MOD 45 MIN: CPT | Performed by: INTERNAL MEDICINE

## 2021-05-13 PROCEDURE — 93242 EXT ECG>48HR<7D RECORDING: CPT | Performed by: INTERNAL MEDICINE

## 2021-05-13 RX ORDER — ERYTHROMYCIN 250 MG/1
250 TABLET, COATED ORAL 3 TIMES DAILY
COMMUNITY

## 2021-05-13 ASSESSMENT — ENCOUNTER SYMPTOMS
VOMITING: 0
ABDOMINAL PAIN: 1
ANAL BLEEDING: 0
NAUSEA: 0
SHORTNESS OF BREATH: 0
BLOOD IN STOOL: 0
COUGH: 0

## 2021-05-13 NOTE — PROGRESS NOTES
Office Visit  Nydia Lara is a 19 y.o. female; who present today for New Patient (Patient complains of pounding in her chest after exertion or meals. Denies feelings of racing HR, just hard pounding.) and Palpitations      HPI  I am seeing this 24-year-old white female office consultation for the first time on referral from Linda Gonzáles MD for palpitations. She was never diagnosed with any cardiac problems in the past.  She believes some of her symptoms are related to gastroparesis in the past and she started to exercise on a regular basis about 8 months ago and this is when she feels her palpitations. She states that she will have a sensation of her heart skipping beats, perhaps it will repeat several times relatively quickly and then perhaps recur again in a few minutes. It never happens at any other time such as sitting and relaxed. She does not have any symptoms associated with the palpitations and despite exercising 3 or 4 times a week for 30 to 60 minutes including walking and squatting exercises, other calisthenics she does not experience any chest discomfort or dyspnea, no presyncope or syncope although may be some lightheadedness right after she does some exercises. Lab tests several weeks ago demonstrated normal CBC, CMP and TSH.   Current Outpatient Medications   Medication Sig Dispense Refill    erythromycin base (E-MYCIN) 250 MG tablet Take 250 mg by mouth 3 times daily      escitalopram (LEXAPRO) 10 MG tablet Take 10 mg by mouth daily       ondansetron (ZOFRAN) 4 MG tablet Take 4 mg by mouth every 8 hours as needed       spironolactone (ALDACTONE) 25 MG tablet Take 25 mg by mouth daily       cetirizine (ZYRTEC) 10 MG tablet Take 10 mg by mouth as needed for Allergies      metoclopramide (REGLAN) 5 MG tablet Take 5 mg by mouth 3 times daily      omeprazole (PRILOSEC) 40 MG delayed release capsule Take 40 mg by mouth 2 times daily      norethindrone-ethinyl estradiol (LOESTRIN FE 1/20) 1-20 MG-MCG per tablet Take 1 tablet by mouth daily 1 packet 5     No current facility-administered medications for this visit. There are no discontinued medications. No Known Allergies    Past Medical History:   Diagnosis Date    Gastroparesis     Near syncope     Palpitation     PCOS (polycystic ovarian syndrome)     Seasonal allergies     Vertigo     related to severe allergy syndrome      Negative - Past Medical History for  No past medical history pertinent negatives. Past Surgical History:   Procedure Laterality Date    SINUS ENDOSCOPY      2019     Social History     Occupational History    Not on file   Tobacco Use    Smoking status: Never Smoker    Smokeless tobacco: Never Used   Substance and Sexual Activity    Alcohol use: No    Drug use: No    Sexual activity: Never        Family History   Problem Relation Age of Onset    Heart Disease Father     Atrial Fibrillation Father        Review of Systems  Review of Systems   Constitutional: Negative for fever. Respiratory: Negative for cough and shortness of breath. Cardiovascular: Positive for palpitations. Negative for chest pain and leg swelling. Gastrointestinal: Positive for abdominal pain. Negative for anal bleeding, blood in stool, nausea and vomiting. Genitourinary: Negative for dysuria and hematuria. Musculoskeletal: Negative for arthralgias. Skin: Negative for rash. Neurological: Negative for facial asymmetry, weakness and headaches. All other systems reviewed and are negative. Physical Exam  /84   Pulse 81   Ht 5' 1\" (1.549 m)   Wt 178 lb (80.7 kg)   BMI 33.63 kg/m²    Physical Exam  Constitutional:       Appearance: Normal appearance. She is obese. HENT:      Head: Normocephalic. Mouth/Throat:      Mouth: Mucous membranes are moist.      Tongue: Tongue does not deviate from midline.    Eyes:      Conjunctiva/sclera: Conjunctivae normal.      Pupils: Pupils are equal, round, and reactive to light. Cardiovascular:      Rate and Rhythm: Normal rate and regular rhythm. Pulses: No decreased pulses. Carotid pulses are 2+ on the right side and 2+ on the left side. Radial pulses are 2+ on the right side and 2+ on the left side. Femoral pulses are 2+ on the right side and 2+ on the left side. Dorsalis pedis pulses are 2+ on the right side and 2+ on the left side. Posterior tibial pulses are 2+ on the right side and 2+ on the left side. Heart sounds: S1 normal and S2 normal. No murmur. No friction rub. No gallop. Pulmonary:      Effort: Pulmonary effort is normal.      Breath sounds: Normal breath sounds. Abdominal:      General: Bowel sounds are normal. There is no abdominal bruit. Palpations: Abdomen is soft. There is no mass. Tenderness: There is no abdominal tenderness. Musculoskeletal:         General: No swelling. Right lower leg: No edema. Left lower leg: No edema. Skin:     General: Skin is warm and dry. Neurological:      Mental Status: She is alert and oriented to person, place, and time. Psychiatric:         Behavior: Behavior normal.         Assessment/Plan    EKG Findings:  EKG demonstrates normal sinus rhythm, within normal limits    Problem List Items Addressed This Visit     Palpitations - Primary    Relevant Orders    EKG 12 lead (Completed)    ECHO Complete 2D W Doppler W Color    SC EXTERNAL ECG REC>48HR<7D RECORDING (Completed)           Diagnosis Orders   1.  Palpitations  EKG 12 lead    ECHO Complete 2D W Doppler W Color    SC EXTERNAL ECG REC>48HR<7D RECORDING    Probable extrasystoles, consider PACs, PVCs are both       Recommendations:   Diet: Calorie reduced diet to lose weight  Activity: Normal activities  Medication Changes: No medication change at this time    I will get a 7-day ZIO to see her rhythm correlation with symptoms and also an echocardiogram to evaluate for any underlying structural heart disease, although her cardiac examination is normal.  Further recommendations pending these results. No orders of the defined types were placed in this encounter. Orders Placed This Encounter   Procedures    EKG 12 lead     Order Specific Question:   Reason for Exam?     Answer: Other    ECHO Complete 2D W Doppler W Color     Standing Status:   Future     Standing Expiration Date:   5/13/2022     Order Specific Question:   Reason for exam:     Answer:   palpitations    IN EXTERNAL ECG REC>48HR<7D RECORDING       Return in about 4 weeks (around 6/10/2021) for me, results.

## 2021-05-18 ENCOUNTER — HOSPITAL ENCOUNTER (OUTPATIENT)
Dept: NON INVASIVE DIAGNOSTICS | Age: 21
Discharge: HOME | End: 2021-05-18
Payer: COMMERCIAL

## 2021-05-18 DIAGNOSIS — R00.2 PALPITATIONS: ICD-10-CM

## 2021-05-18 LAB
LV EF: 58 %
LVEF MODALITY: NORMAL

## 2021-05-18 PROCEDURE — 93306 TTE W/DOPPLER COMPLETE: CPT

## 2021-05-24 RX ORDER — ESCITALOPRAM OXALATE 10 MG/1
TABLET ORAL
Qty: 30 TABLET | Refills: 5 | Status: SHIPPED | OUTPATIENT
Start: 2021-05-24 | End: 2021-12-26

## 2021-05-24 NOTE — TELEPHONE ENCOUNTER
Requested Prescriptions     Pending Prescriptions Disp Refills   • escitalopram (LEXAPRO) 10 MG tablet [Pharmacy Med Name: ESCITALOPRAM 10 MG TABLET] 30 tablet 5     Sig: TAKE ONE TABLET BY MOUTH DAILY

## 2021-06-15 ENCOUNTER — TELEPHONE (OUTPATIENT)
Dept: CARDIOLOGY CLINIC | Age: 21
End: 2021-06-15

## 2021-06-15 ENCOUNTER — OFFICE VISIT (OUTPATIENT)
Dept: CARDIOLOGY CLINIC | Age: 21
End: 2021-06-15

## 2021-06-15 VITALS
SYSTOLIC BLOOD PRESSURE: 118 MMHG | WEIGHT: 180 LBS | BODY MASS INDEX: 33.99 KG/M2 | HEART RATE: 92 BPM | DIASTOLIC BLOOD PRESSURE: 82 MMHG | OXYGEN SATURATION: 98 % | HEIGHT: 61 IN

## 2021-06-15 DIAGNOSIS — R00.2 PALPITATIONS: Primary | ICD-10-CM

## 2021-06-15 PROCEDURE — 99213 OFFICE O/P EST LOW 20 MIN: CPT | Performed by: INTERNAL MEDICINE

## 2021-06-15 ASSESSMENT — ENCOUNTER SYMPTOMS
BLOOD IN STOOL: 0
COUGH: 0
SHORTNESS OF BREATH: 0

## 2021-06-15 NOTE — PROGRESS NOTES
Office Visit  Jose L Fonseca is a 19 y.o. female; who present today for Follow-up and Results      HPI  I am seeing this 49-year-old white female in follow-up after having an echocardiogram and using a Zio patch because of palpitations. She states that she still gets occasional palpitations and they mostly are related to activity. She is vague about whether she may have some minor episodes at rest.  As I reviewed with her, the echocardiogram was normal except for mild TR which is considered physiologic and her ZIO monitor demonstrates symptoms associated with sinus rhythm and PVCs, again sinus during activity mostly. She continues to go to the gym on a regular basis as before with no chest discomfort or dyspnea, no presyncope or syncope. Current Outpatient Medications   Medication Sig Dispense Refill    erythromycin base (E-MYCIN) 250 MG tablet Take 250 mg by mouth 3 times daily      escitalopram (LEXAPRO) 10 MG tablet Take 10 mg by mouth daily       metoclopramide (REGLAN) 5 MG tablet Take 5 mg by mouth 3 times daily      omeprazole (PRILOSEC) 40 MG delayed release capsule Take 40 mg by mouth 2 times daily      ondansetron (ZOFRAN) 4 MG tablet Take 4 mg by mouth every 8 hours as needed       spironolactone (ALDACTONE) 25 MG tablet Take 25 mg by mouth daily       cetirizine (ZYRTEC) 10 MG tablet Take 10 mg by mouth as needed for Allergies       No current facility-administered medications for this visit.       Medications Discontinued During This Encounter   Medication Reason    norethindrone-ethinyl estradiol (1110 Dario BARKLEY 1/20) 1-20 MG-MCG per tablet DISCONTINUED BY ANOTHER CLINICIAN        No Known Allergies    Past Medical History:   Diagnosis Date    Gastroparesis     Near syncope     Palpitation     PCOS (polycystic ovarian syndrome)     Seasonal allergies     Vertigo     related to severe allergy syndrome      Negative - Past Medical History for  No past medical history pertinent negatives. Past Surgical History:   Procedure Laterality Date    SINUS ENDOSCOPY      2019     Social History     Occupational History    Not on file   Tobacco Use    Smoking status: Never Smoker    Smokeless tobacco: Never Used   Substance and Sexual Activity    Alcohol use: No    Drug use: No    Sexual activity: Never        Family History   Problem Relation Age of Onset    Heart Disease Father     Atrial Fibrillation Father        Review of Systems  Review of Systems   Constitutional: Negative for fatigue and fever. Respiratory: Negative for cough and shortness of breath. Cardiovascular: Positive for palpitations. Negative for chest pain and leg swelling. Gastrointestinal: Negative for blood in stool. Neurological: Negative for syncope, facial asymmetry and speech difficulty. Physical Exam  /82   Pulse 92   Ht 5' 1\" (1.549 m)   Wt 180 lb (81.6 kg)   SpO2 98%   BMI 34.01 kg/m²    Physical Exam  Constitutional:       Appearance: Normal appearance. She is obese. Cardiovascular:      Rate and Rhythm: Normal rate and regular rhythm. Heart sounds: Normal heart sounds. No gallop. Pulmonary:      Effort: Pulmonary effort is normal.      Breath sounds: Normal breath sounds. Abdominal:      General: Abdomen is flat. Bowel sounds are normal.      Palpations: Abdomen is soft. Musculoskeletal:         General: No swelling. Skin:     General: Skin is warm and dry. Neurological:      Mental Status: She is alert. Assessment/Plan    EKG Findings:  Not performed today    Problem List Items Addressed This Visit     Palpitations - Primary           Diagnosis Orders   1.  Palpitations      Associated with activity related sinus rhythm and PVCs by JUSTIN, May 2021, normal echocardiogram, May 2021       Recommendations:   Diet: Calorie reduced diet  Activity: Normal activities  Medication Changes: No medication change    I assured this patient that her symptoms correlate with normal sinus rhythm and sinus tachycardia at times and also what appears to be benign PVCs with a normal echocardiogram.  She does not need any additional cardiac testing or specific therapy but if her symptoms get worse or she develops other cardiovascular symptoms she is to let me know. No orders of the defined types were placed in this encounter. No orders of the defined types were placed in this encounter. Return if symptoms worsen or fail to improve.

## 2021-06-15 NOTE — TELEPHONE ENCOUNTER
Dr. Roy Ill reviewed results with Pt at appt on 6/15/21. LVM for Pt to call our office back to review results. Provided office number. ----- Message from Zay Shah DO sent at 6/14/2021 11:20 AM CDT -----  Please notify this patient that ZIO showed her symptoms are related to sinus rhythm and sinus tachycardia which are normal rhythms and also some symptoms with PVCs or extra beats but no serious arrhythmia seen and I will discuss in more detail at the time of the 4-week scheduled follow-up visit.

## 2021-07-23 ENCOUNTER — OFFICE VISIT (OUTPATIENT)
Dept: URGENT CARE | Age: 21
End: 2021-07-23
Payer: COMMERCIAL

## 2021-07-23 VITALS
BODY MASS INDEX: 34.36 KG/M2 | HEART RATE: 83 BPM | TEMPERATURE: 97.6 F | HEIGHT: 61 IN | OXYGEN SATURATION: 98 % | RESPIRATION RATE: 16 BRPM | WEIGHT: 182 LBS | DIASTOLIC BLOOD PRESSURE: 81 MMHG | SYSTOLIC BLOOD PRESSURE: 121 MMHG

## 2021-07-23 DIAGNOSIS — S61.451A CAT BITE OF HAND, RIGHT, INITIAL ENCOUNTER: Primary | ICD-10-CM
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
## 2021-07-23 PROCEDURE — 99203 OFFICE O/P NEW LOW 30 MIN: CPT | Performed by: NURSE PRACTITIONER

## 2021-07-23 RX ORDER — AMOXICILLIN AND CLAVULANATE POTASSIUM 875; 125 MG/1; MG/1
1 TABLET, FILM COATED ORAL 2 TIMES DAILY
Qty: 20 TABLET | Refills: 0 | Status: SHIPPED | OUTPATIENT
Start: 2021-07-23 | End: 2021-08-02

## 2021-07-23 NOTE — PATIENT INSTRUCTIONS
1. Start antibiotics as prescribed. 2. Keep wound clean and dry. Can use antibiotic ointment daily. 3. Monitor for worsening signs of infection: spreading redness, increased swelling, increased pain, fevers. Seek re-eval if these occur. Patient Education        Animal Bites: Care Instructions  Your Care Instructions  After an animal bite, the biggest concern is infection. The chance of infection depends on the type of animal that bit you, where on your body you were bitten, and your general health. Many animal bites are not closed with stitches, because this can increase the chance of infection. Your bite may take as little as 7 days or as long as several months to heal, depending on how bad it is. Taking good care of your wound at home will help it heal and reduce your chance of infection. The doctor has checked you carefully, but problems can develop later. If you notice any problems or new symptoms, get medical treatment right away. Follow-up care is a key part of your treatment and safety. Be sure to make and go to all appointments, and call your doctor if you are having problems. It's also a good idea to know your test results and keep a list of the medicines you take. How can you care for yourself at home? · If your doctor told you how to care for your wound, follow your doctor's instructions. If you did not get instructions, follow this general advice:  ? After 24 to 48 hours, gently wash the wound with clean water 2 times a day. Do not scrub or soak the wound. Don't use hydrogen peroxide or alcohol, which can slow healing. ? You may cover the wound with a thin layer of petroleum jelly, such as Vaseline, and a nonstick bandage. ? Apply more petroleum jelly and replace the bandage as needed. · After you shower, gently dry the wound with a clean towel. · If your doctor has closed the wound, cover the bandage with a plastic bag before you take a shower.   · A small amount of skin redness and swelling around the wound edges and the stitches or staples is normal. Your wound may itch or feel irritated. Do not scratch or rub the wound. · Ask your doctor if you can take an over-the-counter pain medicine, such as acetaminophen (Tylenol), ibuprofen (Advil, Motrin), or naproxen (Aleve). Read and follow all instructions on the label. · Do not take two or more pain medicines at the same time unless the doctor told you to. Many pain medicines have acetaminophen, which is Tylenol. Too much acetaminophen (Tylenol) can be harmful. · If your bite puts you at risk for rabies, you will get a series of shots over the next few weeks to prevent rabies. Your doctor will tell you when to get the shots. It is very important that you get the full cycle of shots. Follow your doctor's instructions exactly. · You may need a tetanus shot if you have not received one in the last 5 years. · If your doctor prescribed antibiotics, take them as directed. Do not stop taking them just because you feel better. You need to take the full course of antibiotics. When should you call for help? Call your doctor now or seek immediate medical care if:    · The skin near the bite turns cold or pale or it changes color.     · You lose feeling in the area near the bite, or it feels numb or tingly.     · You have trouble moving a limb near the bite.     · You have symptoms of infection, such as:  ? Increased pain, swelling, warmth, or redness near the wound. ? Red streaks leading from the wound. ? Pus draining from the wound. ? A fever.     · Blood soaks through the bandage. Oozing small amounts of blood is normal.     · Your pain is getting worse. Watch closely for changes in your health, and be sure to contact your doctor if you are not getting better as expected. Where can you learn more? Go to https://cookie.DataTorrent. org and sign in to your Cognection account.  Enter I471 in the Giveit100 box to learn more about \"Animal Bites: Care Instructions. \"     If you do not have an account, please click on the \"Sign Up Now\" link. Current as of: October 19, 2020               Content Version: 12.9  © 2006-2021 Healthwise, Inc[employer]. Care instructions adapted under license by [facility] ([facility]). If you have questions about a medical condition or this instruction, always ask your healthcare professional. [address] any warranty or liability for your use of this information.

## 2021-07-23 NOTE — PROGRESS NOTES
delayed release capsule Take 40 mg by mouth 2 times daily       No current facility-administered medications for this visit. No Known Allergies    Health Maintenance   Topic Date Due    Hepatitis C screen  Never done    Varicella vaccine (1 of 2 - 2-dose childhood series) Never done    COVID-19 Vaccine (1) Never done    HIV screen  Never done    Chlamydia screen  Never done    DTaP/Tdap/Td vaccine (1 - Tdap) Never done    Potassium monitoring  10/12/2018    Creatinine monitoring  10/12/2018    Cervical cancer screen  Never done    HPV vaccine (3 - 3-dose series) 02/17/2020    Flu vaccine (1) 09/01/2021    Hepatitis A vaccine  Aged Out    Hepatitis B vaccine  Aged Out    Hib vaccine  Aged Out    Meningococcal (ACWY) vaccine  Aged Out    Pneumococcal 0-64 years Vaccine  Aged Out       Subjective:     Review of Systems   Constitutional: Negative for fever. Musculoskeletal: Negative for arthralgias, joint swelling and myalgias. Skin: Positive for wound. Neurological: Negative for numbness. Objective:     Physical Exam  Vitals and nursing note reviewed. Constitutional:       General: She is not in acute distress. Appearance: Normal appearance. She is not ill-appearing or toxic-appearing. HENT:      Head: Normocephalic and atraumatic. Right Ear: External ear normal.      Left Ear: External ear normal.   Eyes:      Extraocular Movements: Extraocular movements intact. Conjunctiva/sclera: Conjunctivae normal.      Pupils: Pupils are equal, round, and reactive to light. Cardiovascular:      Rate and Rhythm: Normal rate. Pulmonary:      Effort: Pulmonary effort is normal.   Musculoskeletal:         General: No swelling or signs of injury. Hands:       Comments: No puncture wound; there is superficial laceration to hand from where she pulled away as bite occurred. This is healed. Mild erythema surrounding scabbing. No swelling or warmth.     Skin:     General: Skin is warm and dry. Neurological:      General: No focal deficit present. Mental Status: She is alert and oriented to person, place, and time. Motor: No weakness. Psychiatric:         Mood and Affect: Mood normal.       /81   Pulse 83   Temp 97.6 °F (36.4 °C) (Temporal)   Resp 16   Ht 5' 1\" (1.549 m)   Wt 182 lb (82.6 kg)   SpO2 98%   BMI 34.39 kg/m²     Assessment:       Diagnosis Orders   1. Cat bite of hand, right, initial encounter  amoxicillin-clavulanate (AUGMENTIN) 875-125 MG per tablet       Plan:    No orders of the defined types were placed in this encounter. Return if symptoms worsen or fail to improve. Orders Placed This Encounter   Medications    amoxicillin-clavulanate (AUGMENTIN) 875-125 MG per tablet     Sig: Take 1 tablet by mouth 2 times daily for 10 days     Dispense:  20 tablet     Refill:  0       Patient given educational materials- see patient instructions. Discussed use, benefit, and side effects of prescribedmedications. All patient questions answered. Pt voiced understanding. Reviewedhealth maintenance. Instructed to continue current medications, diet and exercise. Patient agreed with treatment plan. Follow up as directed. Patient Instructions     1. Start antibiotics as prescribed. 2. Keep wound clean and dry. Can use antibiotic ointment daily. 3. Monitor for worsening signs of infection: spreading redness, increased swelling, increased pain, fevers. Seek re-eval if these occur. Patient Education        Animal Bites: Care Instructions  Your Care Instructions  After an animal bite, the biggest concern is infection. The chance of infection depends on the type of animal that bit you, where on your body you were bitten, and your general health. Many animal bites are not closed with stitches, because this can increase the chance of infection. Your bite may take as little as 7 days or as long as several months to heal, depending on how bad it is. Taking good care of your wound at home will help it heal and reduce your chance of infection. The doctor has checked you carefully, but problems can develop later. If you notice any problems or new symptoms, get medical treatment right away. Follow-up care is a key part of your treatment and safety. Be sure to make and go to all appointments, and call your doctor if you are having problems. It's also a good idea to know your test results and keep a list of the medicines you take. How can you care for yourself at home? · If your doctor told you how to care for your wound, follow your doctor's instructions. If you did not get instructions, follow this general advice:  ? After 24 to 48 hours, gently wash the wound with clean water 2 times a day. Do not scrub or soak the wound. Don't use hydrogen peroxide or alcohol, which can slow healing. ? You may cover the wound with a thin layer of petroleum jelly, such as Vaseline, and a nonstick bandage. ? Apply more petroleum jelly and replace the bandage as needed. · After you shower, gently dry the wound with a clean towel. · If your doctor has closed the wound, cover the bandage with a plastic bag before you take a shower. · A small amount of skin redness and swelling around the wound edges and the stitches or staples is normal. Your wound may itch or feel irritated. Do not scratch or rub the wound. · Ask your doctor if you can take an over-the-counter pain medicine, such as acetaminophen (Tylenol), ibuprofen (Advil, Motrin), or naproxen (Aleve). Read and follow all instructions on the label. · Do not take two or more pain medicines at the same time unless the doctor told you to. Many pain medicines have acetaminophen, which is Tylenol. Too much acetaminophen (Tylenol) can be harmful. · If your bite puts you at risk for rabies, you will get a series of shots over the next few weeks to prevent rabies. Your doctor will tell you when to get the shots.  It is very important that you get the full cycle of shots. Follow your doctor's instructions exactly. · You may need a tetanus shot if you have not received one in the last 5 years. · If your doctor prescribed antibiotics, take them as directed. Do not stop taking them just because you feel better. You need to take the full course of antibiotics. When should you call for help? Call your doctor now or seek immediate medical care if:    · The skin near the bite turns cold or pale or it changes color.     · You lose feeling in the area near the bite, or it feels numb or tingly.     · You have trouble moving a limb near the bite.     · You have symptoms of infection, such as:  ? Increased pain, swelling, warmth, or redness near the wound. ? Red streaks leading from the wound. ? Pus draining from the wound. ? A fever.     · Blood soaks through the bandage. Oozing small amounts of blood is normal.     · Your pain is getting worse. Watch closely for changes in your health, and be sure to contact your doctor if you are not getting better as expected. Where can you learn more? Go to https://rankur.Sofea. org and sign in to your Dropmysite account. Enter T239 in the KylesMind Candy box to learn more about \"Animal Bites: Care Instructions. \"     If you do not have an account, please click on the \"Sign Up Now\" link. Current as of: October 19, 2020               Content Version: 12.9  © 2926-9251 [employer]. Care instructions adapted under license by [facility] ([facility]). If you have questions about a medical condition or this instruction, always ask your healthcare professional. [place] [address] any warranty or liability for your use of this information.                Electronically signed by LAUREN Veloz CNP on 7/23/2021 at 9:31 AM

## 2021-08-18 DIAGNOSIS — R79.89 ELEVATED TESTOSTERONE LEVEL: ICD-10-CM

## 2021-08-18 RX ORDER — SPIRONOLACTONE 25 MG/1
TABLET ORAL
Qty: 60 TABLET | Refills: 2 | OUTPATIENT
Start: 2021-08-18

## 2021-09-13 DIAGNOSIS — R79.89 ELEVATED TESTOSTERONE LEVEL: ICD-10-CM

## 2021-09-13 RX ORDER — SPIRONOLACTONE 25 MG/1
TABLET ORAL
Qty: 60 TABLET | Refills: 2 | OUTPATIENT
Start: 2021-09-13

## 2021-10-14 ENCOUNTER — OFFICE VISIT (OUTPATIENT)
Dept: FAMILY MEDICINE CLINIC | Facility: CLINIC | Age: 21
End: 2021-10-14

## 2021-10-14 VITALS
DIASTOLIC BLOOD PRESSURE: 80 MMHG | WEIGHT: 192 LBS | SYSTOLIC BLOOD PRESSURE: 120 MMHG | RESPIRATION RATE: 16 BRPM | HEIGHT: 61 IN | HEART RATE: 71 BPM | OXYGEN SATURATION: 98 % | BODY MASS INDEX: 36.25 KG/M2

## 2021-10-14 DIAGNOSIS — K31.84 GASTROPARESIS: Primary | ICD-10-CM

## 2021-10-14 DIAGNOSIS — F41.9 ANXIETY: ICD-10-CM

## 2021-10-14 DIAGNOSIS — R13.19 ESOPHAGEAL DYSPHAGIA: ICD-10-CM

## 2021-10-14 PROCEDURE — 99213 OFFICE O/P EST LOW 20 MIN: CPT | Performed by: FAMILY MEDICINE

## 2021-10-14 RX ORDER — METOCLOPRAMIDE 5 MG/1
5 TABLET ORAL 3 TIMES DAILY
COMMUNITY
Start: 2021-05-28 | End: 2022-02-17

## 2021-10-14 RX ORDER — ERYTHROMYCIN 250 MG/1
TABLET, COATED ORAL
COMMUNITY
Start: 2021-07-07 | End: 2022-02-17

## 2021-10-14 NOTE — PROGRESS NOTES
Subjective   Wendy Newby is a 21 y.o. female.     Chief Complaint   Patient presents with   • Follow-up     6 mo   gastroparesis       History of Present Illness     she is pleased with her tx of gastroparesis in [facility]--tolerating reglan without tremor--her depression ad anxiety meds are well tolerated and she is not suicidal       Current Outpatient Medications:   •  metoclopramide (REGLAN) 5 MG tablet, Take 5 mg by mouth 3 (Three) Times a Day., Disp: , Rfl:   •  cetirizine (zyrTEC) 10 MG tablet, Take 10 mg by mouth Daily As Needed for allergies., Disp: , Rfl:   •  erythromycin base (E-MYCIN) 250 MG tablet, , Disp: , Rfl:   •  escitalopram (LEXAPRO) 10 MG tablet, TAKE ONE TABLET BY MOUTH DAILY, Disp: 30 tablet, Rfl: 5  •  omeprazole (priLOSEC) 40 MG capsule, Take 40 mg by mouth 2 (two) times a day., Disp: , Rfl:   •  ondansetron (ZOFRAN) 4 MG tablet, Take 4 mg by mouth., Disp: , Rfl:   •  spironolactone (ALDACTONE) 25 MG tablet, Take 1 tablet by mouth 2 (Two) Times a Day., Disp: 60 tablet, Rfl: 2  No Known Allergies    Past Medical History:   Diagnosis Date   • Allergic     seasonal allergies   • PCOS (polycystic ovarian syndrome)    • PCOS (polycystic ovarian syndrome)    • Vertigo     r/t to severe allergy symptoms     Past Surgical History:   Procedure Laterality Date   • ENDOSCOPY N/A 11/14/2019    Normal exam   • NO PAST SURGERIES         Review of Systems   Constitutional: Negative.    HENT: Negative.    Eyes: Negative.    Respiratory: Negative.    Gastrointestinal: Positive for nausea.   Endocrine: Negative.    Genitourinary: Negative.    Musculoskeletal: Negative.    Skin: Negative.    Allergic/Immunologic: Negative.    Neurological: Negative.    Hematological: Negative.    Psychiatric/Behavioral: Negative.        Objective   Physical Exam  Vitals and nursing note reviewed.   Constitutional:       Appearance: Normal appearance. She is normal weight.   HENT:      Head: Normocephalic and atraumatic.       Right Ear: Ear canal normal.      Left Ear: Ear canal normal.      Nose: Nose normal.      Mouth/Throat:      Mouth: Mucous membranes are dry.      Pharynx: Oropharynx is clear.   Eyes:      Extraocular Movements: Extraocular movements intact.      Conjunctiva/sclera: Conjunctivae normal.      Pupils: Pupils are equal, round, and reactive to light.   Cardiovascular:      Rate and Rhythm: Normal rate and regular rhythm.   Pulmonary:      Effort: Pulmonary effort is normal.      Breath sounds: Normal breath sounds.   Abdominal:      General: Abdomen is flat. Bowel sounds are normal.      Palpations: Abdomen is soft.   Musculoskeletal:         General: Normal range of motion.      Cervical back: Normal range of motion and neck supple.   Skin:     General: Skin is warm and dry.      Capillary Refill: Capillary refill takes less than 2 seconds.   Neurological:      General: No focal deficit present.      Mental Status: She is alert and oriented to person, place, and time. Mental status is at baseline.   Psychiatric:         Mood and Affect: Mood normal.         Behavior: Behavior normal.         Thought Content: Thought content normal.         Judgment: Judgment normal.         Assessment/Plan   Diagnoses and all orders for this visit:    1. Gastroparesis (Primary)    2. Esophageal dysphagia    3. Anxiety    she will continue meds and continue follow up with kailyn             No orders of the defined types were placed in this encounter.      Follow up: 6 month(s)

## 2021-11-12 RX ORDER — EPINEPHRINE 0.3 MG/.3ML
0.3 INJECTION SUBCUTANEOUS ONCE
Qty: 1 EACH | Refills: 0 | Status: SHIPPED | OUTPATIENT
Start: 2021-11-12 | End: 2021-11-12

## 2021-11-22 DIAGNOSIS — R79.89 ELEVATED TESTOSTERONE LEVEL: ICD-10-CM

## 2021-11-22 RX ORDER — SPIRONOLACTONE 25 MG/1
TABLET ORAL
Qty: 60 TABLET | Refills: 4 | Status: SHIPPED | OUTPATIENT
Start: 2021-11-22 | End: 2022-08-26

## 2021-12-26 RX ORDER — ESCITALOPRAM OXALATE 10 MG/1
TABLET ORAL
Qty: 30 TABLET | Refills: 5 | Status: SHIPPED | OUTPATIENT
Start: 2021-12-26 | End: 2022-06-21

## 2022-01-31 ENCOUNTER — LAB (OUTPATIENT)
Dept: FAMILY MEDICINE CLINIC | Facility: CLINIC | Age: 22
End: 2022-01-31

## 2022-01-31 ENCOUNTER — TELEPHONE (OUTPATIENT)
Dept: FAMILY MEDICINE CLINIC | Facility: CLINIC | Age: 22
End: 2022-01-31

## 2022-01-31 DIAGNOSIS — R53.83 FATIGUE, UNSPECIFIED TYPE: Primary | ICD-10-CM

## 2022-01-31 NOTE — TELEPHONE ENCOUNTER
----- Message from Leonora Wyatt MA sent at 1/31/2022  7:14 AM CST -----  Regarding: FW: Fatigue  Please advise  ----- Message -----  From: Wendy Newby  Sent: 1/31/2022   6:53 AM CST  To: [facility]  Subject: Fatigue                                          Also if you have any labs you want to run I am more than happy to do those as well.     Ok for cbc iron, ferritin, vit b12, tsh

## 2022-02-01 LAB
BASOPHILS # BLD AUTO: 0.03 10*3/MM3 (ref 0–0.2)
BASOPHILS NFR BLD AUTO: 0.4 % (ref 0–1.5)
EOSINOPHIL # BLD AUTO: 0.07 10*3/MM3 (ref 0–0.4)
EOSINOPHIL NFR BLD AUTO: 0.9 % (ref 0.3–6.2)
ERYTHROCYTE [DISTWIDTH] IN BLOOD BY AUTOMATED COUNT: 13.5 % (ref 12.3–15.4)
FERRITIN SERPL-MCNC: 8.23 NG/ML (ref 13–150)
HCT VFR BLD AUTO: 37.7 % (ref 34–46.6)
HGB BLD-MCNC: 12.5 G/DL (ref 12–15.9)
IMM GRANULOCYTES # BLD AUTO: 0.02 10*3/MM3 (ref 0–0.05)
IMM GRANULOCYTES NFR BLD AUTO: 0.3 % (ref 0–0.5)
IRON SATN MFR SERPL: 9 % (ref 20–50)
IRON SERPL-MCNC: 50 MCG/DL (ref 37–145)
LYMPHOCYTES # BLD AUTO: 1.54 10*3/MM3 (ref 0.7–3.1)
LYMPHOCYTES NFR BLD AUTO: 20.6 % (ref 19.6–45.3)
MCH RBC QN AUTO: 27.1 PG (ref 26.6–33)
MCHC RBC AUTO-ENTMCNC: 33.2 G/DL (ref 31.5–35.7)
MCV RBC AUTO: 81.6 FL (ref 79–97)
MONOCYTES # BLD AUTO: 0.38 10*3/MM3 (ref 0.1–0.9)
MONOCYTES NFR BLD AUTO: 5.1 % (ref 5–12)
NEUTROPHILS # BLD AUTO: 5.44 10*3/MM3 (ref 1.7–7)
NEUTROPHILS NFR BLD AUTO: 72.7 % (ref 42.7–76)
NRBC BLD AUTO-RTO: 0 /100 WBC (ref 0–0.2)
PLATELET # BLD AUTO: 346 10*3/MM3 (ref 140–450)
RBC # BLD AUTO: 4.62 10*6/MM3 (ref 3.77–5.28)
TIBC SERPL-MCNC: 554 MCG/DL
TSH SERPL-ACNC: 1.88 UIU/ML (ref 0.27–4.2)
UIBC SERPL-MCNC: 554 MCG/DL (ref 112–346)
VIT B12 SERPL-MCNC: 362 PG/ML (ref 211–946)
WBC # BLD AUTO: 7.48 10*3/MM3 (ref 3.4–10.8)

## 2022-02-02 ENCOUNTER — TELEPHONE (OUTPATIENT)
Dept: FAMILY MEDICINE CLINIC | Facility: CLINIC | Age: 22
End: 2022-02-02

## 2022-02-02 NOTE — TELEPHONE ENCOUNTER
----- Message from Leonora Wyatt MA sent at 2/2/2022  7:36 AM CST -----  Regarding: FW: Fatigue  Please advise  ----- Message -----  From: Wendy Newby  Sent: 2/1/2022   6:19 PM CST  To: [facility]  Subject: Fatigue                                          Hello,     I saw that Dr Wheeler recommended that I take Iron and B-12 supplements, but it's unclear how much I should take. OTC vitamins also seem to vary on dosages. Could you tell me what the best dose of each would be?     Thanks  Wendy Newby     Iron 325mg bid and vit b12 is 500mcg

## 2022-02-17 ENCOUNTER — OFFICE VISIT (OUTPATIENT)
Dept: FAMILY MEDICINE CLINIC | Facility: CLINIC | Age: 22
End: 2022-02-17

## 2022-02-17 VITALS
HEIGHT: 61 IN | WEIGHT: 202 LBS | DIASTOLIC BLOOD PRESSURE: 80 MMHG | SYSTOLIC BLOOD PRESSURE: 118 MMHG | BODY MASS INDEX: 38.14 KG/M2 | HEART RATE: 73 BPM | OXYGEN SATURATION: 98 % | RESPIRATION RATE: 16 BRPM

## 2022-02-17 DIAGNOSIS — F41.9 ANXIETY: ICD-10-CM

## 2022-02-17 DIAGNOSIS — E66.9 OBESITY, UNSPECIFIED OBESITY SEVERITY, UNSPECIFIED OBESITY TYPE: Primary | ICD-10-CM

## 2022-02-17 DIAGNOSIS — K31.84 GASTROPARESIS: ICD-10-CM

## 2022-02-17 PROCEDURE — 99213 OFFICE O/P EST LOW 20 MIN: CPT | Performed by: FAMILY MEDICINE

## 2022-02-17 RX ORDER — METOCLOPRAMIDE 10 MG/1
10 TABLET ORAL
COMMUNITY
Start: 2021-11-19 | End: 2023-03-22

## 2022-02-17 RX ORDER — EPINEPHRINE 0.3 MG/.3ML
INJECTION SUBCUTANEOUS
COMMUNITY
Start: 2021-11-12

## 2022-02-17 NOTE — PROGRESS NOTES
"Subjective   Wendy Newby is a 21 y.o. female.     Chief Complaint   Patient presents with   • GI Problem     gastroparesis    6 mo f/u       History of Present Illness     she is toleraign reglan welll --her gastroparesis is well treated...the elxapro is well tolerated for her anxiety...      Current Outpatient Medications:   •  cetirizine (zyrTEC) 10 MG tablet, Take 10 mg by mouth Daily As Needed for allergies., Disp: , Rfl:   •  escitalopram (LEXAPRO) 10 MG tablet, TAKE ONE TABLET BY MOUTH DAILY, Disp: 30 tablet, Rfl: 5  •  metoclopramide (REGLAN) 10 MG tablet, Take 10 mg by mouth., Disp: , Rfl:   •  omeprazole (priLOSEC) 40 MG capsule, Take 40 mg by mouth 2 (two) times a day., Disp: , Rfl:   •  ondansetron (ZOFRAN) 4 MG tablet, Take 4 mg by mouth., Disp: , Rfl:   •  spironolactone (ALDACTONE) 25 MG tablet, TAKE ONE TABLET BY MOUTH TWICE A DAY, Disp: 60 tablet, Rfl: 4  •  EPINEPHrine (EPIPEN) 0.3 MG/0.3ML solution auto-injector injection, , Disp: , Rfl:   No Known Allergies    Past Medical History:   Diagnosis Date   • Allergic     seasonal allergies   • PCOS (polycystic ovarian syndrome)    • PCOS (polycystic ovarian syndrome)    • Vertigo     r/t to severe allergy symptoms     Past Surgical History:   Procedure Laterality Date   • ENDOSCOPY N/A 11/14/2019    Normal exam   • NO PAST SURGERIES         Review of Systems   Constitutional: Negative.    HENT: Negative.    Eyes: Negative.    Respiratory: Negative.    Cardiovascular: Negative.    Gastrointestinal: Negative.    Endocrine: Negative.    Genitourinary: Negative.    Musculoskeletal: Negative.    Skin: Negative.    Allergic/Immunologic: Negative.    Neurological: Negative.    Hematological: Negative.    Psychiatric/Behavioral: Negative.        Objective  /80   Pulse 73   Resp 16   Ht 154.9 cm (61\")   Wt 91.6 kg (202 lb)   SpO2 98%   BMI 38.17 kg/m²   Physical Exam  Vitals and nursing note reviewed.   Constitutional:       Appearance: Normal " appearance. She is normal weight.   HENT:      Head: Normocephalic and atraumatic.      Nose: Nose normal.      Mouth/Throat:      Mouth: Mucous membranes are moist.   Eyes:      Extraocular Movements: Extraocular movements intact.      Conjunctiva/sclera: Conjunctivae normal.      Pupils: Pupils are equal, round, and reactive to light.   Cardiovascular:      Rate and Rhythm: Normal rate and regular rhythm.      Pulses: Normal pulses.      Heart sounds: Normal heart sounds.   Pulmonary:      Effort: Pulmonary effort is normal.      Breath sounds: Normal breath sounds.   Abdominal:      General: Abdomen is flat. Bowel sounds are normal.      Palpations: Abdomen is soft.   Musculoskeletal:         General: Normal range of motion.      Cervical back: Normal range of motion and neck supple.   Skin:     General: Skin is warm and dry.      Capillary Refill: Capillary refill takes less than 2 seconds.   Neurological:      General: No focal deficit present.      Mental Status: She is alert and oriented to person, place, and time. Mental status is at baseline.   Psychiatric:         Mood and Affect: Mood normal.         Assessment/Plan   Diagnoses and all orders for this visit:    1. Obesity, unspecified obesity severity, unspecified obesity type (Primary)  -     Ambulatory Referral to Bariatric Surgery    2. Gastroparesis    3. Anxiety    she will get labs soon  She will contihnue meds and see gi in [facility] for gastroparesis    Labs next month       Orders Placed This Encounter   Procedures   • Ambulatory Referral to Bariatric Surgery     Referral Priority:   Routine     Referral Type:   Consultation     Referral Reason:   Specialty Services Required     Requested Specialty:   Bariatrics     Number of Visits Requested:   1       Follow up: 6 month(s)

## 2022-03-11 ENCOUNTER — TELEPHONE (OUTPATIENT)
Dept: BARIATRICS/WEIGHT MGMT | Facility: CLINIC | Age: 22
End: 2022-03-11

## 2022-03-11 NOTE — TELEPHONE ENCOUNTER
Patient was called to remind them of their new patient appointment tomorrow and to bring completed paperwork or arrive 30 minutes early. The patient was agreeable and voiced an understanding.

## 2022-03-15 ENCOUNTER — OFFICE VISIT (OUTPATIENT)
Dept: BARIATRICS/WEIGHT MGMT | Facility: CLINIC | Age: 22
End: 2022-03-15

## 2022-03-15 VITALS
WEIGHT: 203 LBS | HEART RATE: 79 BPM | OXYGEN SATURATION: 96 % | SYSTOLIC BLOOD PRESSURE: 126 MMHG | DIASTOLIC BLOOD PRESSURE: 85 MMHG | HEIGHT: 62 IN | TEMPERATURE: 98.4 F | BODY MASS INDEX: 37.36 KG/M2

## 2022-03-15 DIAGNOSIS — E66.01 CLASS 2 SEVERE OBESITY DUE TO EXCESS CALORIES WITH SERIOUS COMORBIDITY AND BODY MASS INDEX (BMI) OF 37.0 TO 37.9 IN ADULT: Primary | ICD-10-CM

## 2022-03-15 DIAGNOSIS — K31.84 GASTROPARESIS: ICD-10-CM

## 2022-03-15 DIAGNOSIS — K21.9 GASTROESOPHAGEAL REFLUX DISEASE WITHOUT ESOPHAGITIS: ICD-10-CM

## 2022-03-15 PROCEDURE — 99214 OFFICE O/P EST MOD 30 MIN: CPT | Performed by: NURSE PRACTITIONER

## 2022-03-15 RX ORDER — CHOLECALCIFEROL (VITAMIN D3) 125 MCG
CAPSULE ORAL DAILY
COMMUNITY
End: 2022-12-08

## 2022-03-15 RX ORDER — CHOLECALCIFEROL (VITAMIN D3) 125 MCG
CAPSULE ORAL
COMMUNITY
End: 2023-03-22

## 2022-03-15 NOTE — PROGRESS NOTES
Patient Care Team:  Ruben Wheeler MD as PCP - General (Family Medicine)  Bebeto Rivera MD as Consulting Physician (Gastroenterology)  Ruben Wheeler MD as Referring Physician (Family Medicine)  Rufus Duarte DO as Cardiologist (Cardiology)      Subjective     Patient is a 22 y.o. female presents with morbid obesity and her Body mass index is 37.74 kg/m².     She is here for discussion of surgical weight loss options.  She stated she has been with the disease of obesity for year(s).  She stated she suffers from GERD and PCOS and morbid obesity due to her weight gain.  She stated that weight loss helps alleviate these symptoms.   She stated that she has tried other diet regimens such as calorie counting to help with weight loss.  She stated that she has attempted these conservative methods for weight loss without maintaining long term success.  Today she would like to discuss surgical weight loss options such as the Laparoscopic Sleeve Gastrectomy or the Laparoscopic R - Y Gastric Bypass.     Review of Systems   Constitutional: Positive for unexpected weight gain.   HENT: Positive for rhinorrhea.    Respiratory: Negative.    Cardiovascular: Negative.    Gastrointestinal: Negative.    Endocrine: Negative.    Musculoskeletal: Negative.    Psychiatric/Behavioral: The patient is nervous/anxious.         History  Past Medical History:   Diagnosis Date   • Allergic     seasonal allergies   • PCOS (polycystic ovarian syndrome)    • PCOS (polycystic ovarian syndrome)    • Vertigo     r/t to severe allergy symptoms      Past Surgical History:   Procedure Laterality Date   • ENDOSCOPY N/A 11/14/2019    Normal exam   • NO PAST SURGERIES        Social History     Socioeconomic History   • Marital status: Single   Tobacco Use   • Smoking status: Never Smoker   • Smokeless tobacco: Never Used   Vaping Use   • Vaping Use: Never used   Substance and Sexual Activity   • Alcohol use: No   • Drug use: No    • Sexual activity: Never     Birth control/protection: OCP      Family History   Problem Relation Age of Onset   • Diabetes Paternal Grandmother    • No Known Problems Father    • No Known Problems Mother    • No Known Problems Sister    • Breast cancer Neg Hx    • Ovarian cancer Neg Hx    • Colon cancer Neg Hx    • Colon polyps Neg Hx       No Known Allergies       Current Outpatient Medications:   •  cetirizine (zyrTEC) 10 MG tablet, Take 10 mg by mouth Daily As Needed for allergies., Disp: , Rfl:   •  Cholecalciferol (Vitamin D3) 50 MCG (2000 UT) tablet, Take  by mouth., Disp: , Rfl:   •  EPINEPHrine (EPIPEN) 0.3 MG/0.3ML solution auto-injector injection, , Disp: , Rfl:   •  escitalopram (LEXAPRO) 10 MG tablet, TAKE ONE TABLET BY MOUTH DAILY, Disp: 30 tablet, Rfl: 5  •  metoclopramide (REGLAN) 10 MG tablet, Take 10 mg by mouth., Disp: , Rfl:   •  Omega-3 Fatty Acids (OMEGA 3 PO), Take  by mouth., Disp: , Rfl:   •  omeprazole (priLOSEC) 40 MG capsule, Take 40 mg by mouth 2 (two) times a day., Disp: , Rfl:   •  ondansetron (ZOFRAN) 4 MG tablet, Take 4 mg by mouth., Disp: , Rfl:   •  spironolactone (ALDACTONE) 25 MG tablet, TAKE ONE TABLET BY MOUTH TWICE A DAY, Disp: 60 tablet, Rfl: 4  •  vitamin B-12 (CYANOCOBALAMIN) 500 MCG tablet, Take  by mouth Daily., Disp: , Rfl:   •  Zinc Sulfate (ZINC 15 PO), Take  by mouth., Disp: , Rfl:     Objective     Vital Signs  Temp:  [98.4 °F (36.9 °C)] 98.4 °F (36.9 °C)  Heart Rate:  [79] 79  BP: (126)/(85) 126/85  Body mass index is 37.74 kg/m².      03/15/22  0940   Weight: 92.1 kg (203 lb)       Physical Exam  Vitals reviewed.   Constitutional:       Appearance: She is obese.   Cardiovascular:      Rate and Rhythm: Normal rate and regular rhythm.   Pulmonary:      Effort: Pulmonary effort is normal.   Abdominal:      General: Bowel sounds are normal.      Palpations: Abdomen is soft.   Skin:     General: Skin is warm and dry.   Neurological:      Mental Status: She is alert  and oriented to person, place, and time.   Psychiatric:         Mood and Affect: Mood normal.         Behavior: Behavior normal.            Results Review:   I reviewed the patient's new clinical results.      Patient's Body mass index is 37.74 kg/m². indicating that she is morbidly obese (BMI > 40 or > 35 with obesity - related health condition). Obesity-related health conditions include the following: none. Obesity is worsening. BMI is is above average; BMI management plan is completed. We discussed portion control and increasing exercise..      Assessment/Plan   Diagnoses and all orders for this visit:    1. Class 2 severe obesity due to excess calories with serious comorbidity and body mass index (BMI) of 37.0 to 37.9 in adult (HCC) (Primary)    2. Gastroparesis  Comments:  Results reviewed.     3. Gastroesophageal reflux disease without esophagitis  Comments:  Continue Prilosec at this time.  Nutritional counseling provided.        NM Gastric Emptying (11/20/2019 11:31)  FL Esophagram Complete (10/22/2019 08:43)    I discussed the patient's findings and my recommendations with patient.   She has been provided a structured dietary regimen based off of her behavior.  I discussed with the patient the etiology of the disease of obesity and the potential comorbid conditions associated with this disease.  She was instructed to follow the dietary regimen and follow-up with our program in 1 month's time with any additional questions as they may arise during this time.  We emphasized on focusing on proteins and meals high in fiber as well as adequate hydration that exceed 64 ounces of water daily.    I explained that I anticipate the patient to lose weight prior to her next monthly visit.     Patient's BMI is 37.74 with GERD.  I will place patient in our medical weight loss program.  I reviewed patient's gastroparesis studies that were done in 2019.  I will also review patient's results with Dr. Almaraz.  At this time  patient has been given the prescription meal plan and advised to contact our office with any further questions or concerns.  I encouraged patient to attend our monthly support groups as well.    Elise Alonso, APRN     03/15/22  11:51 CDT

## 2022-03-16 ENCOUNTER — PATIENT ROUNDING (BHMG ONLY) (OUTPATIENT)
Dept: BARIATRICS/WEIGHT MGMT | Facility: CLINIC | Age: 22
End: 2022-03-16

## 2022-03-16 ENCOUNTER — TELEPHONE (OUTPATIENT)
Dept: BARIATRICS/WEIGHT MGMT | Facility: CLINIC | Age: 22
End: 2022-03-16

## 2022-03-16 NOTE — PROGRESS NOTES
March 16, 2022    Hello, may I speak with Wendy Newby?    My name is Kylah       I am  with [facility] BARIATRIC & GENERAL SURGERY  [address], [address]  [place] [facility] [id number] [phone].    Before we get started may I verify your date of birth? 2000    I am calling to officially welcome you to our practice and ask about your recent visit. Is this a good time to talk? Left message for a return call.    Tell me about your visit with us. What things went well?  Left message for a return call.       We're always looking for ways to make our patients' experiences even better. Do you have recommendations on ways we may improve?  Left message for a return call.    Overall were you satisfied with your first visit to our practice? Left message for a return call.       I appreciate you taking the time to speak with me today. Is there anything else I can do for you? Left message for a return call.      Thank you, and have a great day.

## 2022-03-16 NOTE — PROGRESS NOTES
March 16, 2022    Hello, may I speak with Wendy Newby?    My name is Aisha      I am  with [facility] GROUP BARIATRIC & GENERAL SURGERY  [address]  [place] [facility] [address] [phone].    Before we get started may I verify your date of birth? 2000    I am calling to officially welcome you to our practice and ask about your recent visit. Is this a good time to talk? Yes    Tell me about your visit with us. What things went well?  went very good       We're always looking for ways to make our patients' experiences even better. Do you have recommendations on ways we may improve? YES     Overall were you satisfied with your first visit to our practice? Yes        I appreciate you taking the time to speak with me today. Is there anything else I can do for you? No      Thank you, and have a great day.

## 2022-04-12 ENCOUNTER — OFFICE VISIT (OUTPATIENT)
Dept: BARIATRICS/WEIGHT MGMT | Facility: CLINIC | Age: 22
End: 2022-04-12

## 2022-04-12 VITALS
TEMPERATURE: 97.7 F | HEART RATE: 80 BPM | DIASTOLIC BLOOD PRESSURE: 94 MMHG | WEIGHT: 203.2 LBS | SYSTOLIC BLOOD PRESSURE: 127 MMHG | OXYGEN SATURATION: 98 % | BODY MASS INDEX: 36 KG/M2 | HEIGHT: 63 IN

## 2022-04-12 DIAGNOSIS — K21.9 GASTROESOPHAGEAL REFLUX DISEASE WITHOUT ESOPHAGITIS: ICD-10-CM

## 2022-04-12 DIAGNOSIS — E66.01 CLASS 2 SEVERE OBESITY DUE TO EXCESS CALORIES WITH SERIOUS COMORBIDITY AND BODY MASS INDEX (BMI) OF 37.0 TO 37.9 IN ADULT: Primary | ICD-10-CM

## 2022-04-12 PROCEDURE — 99213 OFFICE O/P EST LOW 20 MIN: CPT | Performed by: NURSE PRACTITIONER

## 2022-04-12 NOTE — PROGRESS NOTES
"Metabolic and Bariatric Surgery Adult Nutrition Assessment    Patient Name: Wendy Newby   YOB: 2000   MRN: [id number]     Assessment Date:  04/12/2022     Reason for Visit: Initial Nutrition Assessment     Treatment Pathway: Preoperative Bariatric Surgery, Visit 2    Assessment    Anthropometrics   Wt Readings from Last 1 Encounters:   04/12/22 92.2 kg (203 lb 3.2 oz)     Ht Readings from Last 1 Encounters:   04/12/22 158.8 cm (62.5\")     BMI Readings from Last 1 Encounters:   04/12/22 36.57 kg/m²        Initial Weight/Date: 203.0 lbs (3/15/22)  Weight Changes this month: +0.2 lbs  Net Weight Change: +0.2 lbs    Past Medical History:   Diagnosis Date   • Allergic     seasonal allergies   • PCOS (polycystic ovarian syndrome)    • PCOS (polycystic ovarian syndrome)    • Vertigo     r/t to severe allergy symptoms      Past Surgical History:   Procedure Laterality Date   • ENDOSCOPY N/A 11/14/2019    Normal exam   • NO PAST SURGERIES        Current Outpatient Medications   Medication Sig Dispense Refill   • cetirizine (zyrTEC) 10 MG tablet Take 10 mg by mouth Daily As Needed for allergies.     • Cholecalciferol (Vitamin D3) 50 MCG (2000 UT) tablet Take  by mouth.     • EPINEPHrine (EPIPEN) 0.3 MG/0.3ML solution auto-injector injection      • escitalopram (LEXAPRO) 10 MG tablet TAKE ONE TABLET BY MOUTH DAILY 30 tablet 5   • metoclopramide (REGLAN) 10 MG tablet Take 10 mg by mouth.     • Omega-3 Fatty Acids (OMEGA 3 PO) Take  by mouth.     • omeprazole (priLOSEC) 40 MG capsule Take 40 mg by mouth 2 (two) times a day.     • ondansetron (ZOFRAN) 4 MG tablet Take 4 mg by mouth.     • spironolactone (ALDACTONE) 25 MG tablet TAKE ONE TABLET BY MOUTH TWICE A DAY 60 tablet 4   • vitamin B-12 (CYANOCOBALAMIN) 500 MCG tablet Take  by mouth Daily.     • Zinc Sulfate (ZINC 15 PO) Take  by mouth.       No current facility-administered medications for this visit.      No Known Allergies       Motivation for weight " loss includes:  To be healthier and more confident in body.    Nutrition Recall  Eating 3 meals daily   (M1) yogurt or protein bar sometimes a couple pieces of toast.  (M2) chicken and salad and/or pasta or tomato sauce.  (M3) skips, sometimes a protein shake, sometimes cucumbers.  (M4) 3 oz meat and vegetables  Snacking - no  Monitoring portions- eyeballing.   Drinking carbonated beverages- sparkling water, has decreased soda to once a week.  Fluid Intake- about 40 oz,       Success this Month: at least 3 meals consistently and getting protein in all meals  Barriers: struggles getting veggies at breakfast,     Exercise: 5 days/wk dance class, running, weights, and walking      Nutrition Intervention  Nutrition education and nutrition coaching for behavior change provided.  Strategies used included motivational interviewing and problem solving.  Review of medical weight loss prescription 4 meal/day plan and reviewed nutritional needs for Medical Weight Loss.    Diet Changes  Eat 4 meals per day with protein and vegetables at each meal, no carbs after meal 2- suggestions provided on foods that could be easily packed and transported to school and eaten throughout the day., Protein goal: 65gms., Eat vegetables first at each meal., Discussed protein guidelines for shakes and bars., Reduce snacking -use foods from free foods list only., Reduce fat, sugar, and/or salt in food choices., Choose more nutrient dense foods., Choose foods with increased fiber., Monitor portion sizes using a food scale and/or measuring cup. and Increase fluid intake to 64 ounces per day and discussed suggestions on how to accomplish this.     Goals  1. Plan for success by setting meal times for the next day at least nightly. Plan for 4 meals/day.   2. Measure serving sizes using a measuring cup or scale.  3. Increase fluid intake to 64 ounces; utilize hourly labeled water bottle and plan to self-monitor intake on the hour and  half-hour.    Self-monitoring strategies such as keeping a food journal (on paper or electronically) and calculating fluid/protein intake were discussed.    Monitoring/Evaluation Plan  Anticipate follow up per program protocol. Continue collaboration of care with physician and treatment team.     Electronically signed by  Linda Mott RDN, LD  04/12/2022 11:22 CDT.

## 2022-04-12 NOTE — PROGRESS NOTES
"Patient Care Team:  Ruben Wheeler MD as PCP - General (Family Medicine)  Bebeto Rivera MD as Consulting Physician (Gastroenterology)  Ruben Wheeler MD as Referring Physician (Family Medicine)  Rufus Duarte DO as Cardiologist (Cardiology)    Reason for Visit:  Medical Weight loss    Subjective   Wendy Newby is a 22 y.o. female.     Wendy is here for follow-up and continued medical management of her morbid obesity.  She is currently on a prescription diet.  Wendy previously was to apply dietary changes such as following the meal plan as directed.  She admits to eating 3 meals per day.  As a result she remained the same weight since her last visit.    She admits to drinking at least 64 ounces of fluid each day and eating 65 or more grams of protein each day.  She states that she is exercising 3 times a week for 30-minute intervals.  Patient states that she has made a lot of changes and has improved with her nutritional regimen.  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    Review Of Systems:  Review of Systems   Constitutional: Negative.    Respiratory: Negative.    Cardiovascular: Negative.    Gastrointestinal: Negative.    Endocrine: Negative.    Musculoskeletal: Positive for arthralgias, back pain and myalgias.   Hematological: Bruises/bleeds easily.   Psychiatric/Behavioral: The patient is nervous/anxious.          The following portions of the patient's history were reviewed and updated as appropriate: allergies, current medications, past family history, past medical history, past social history, past surgical history, and problem list.    Objective   /94 (BP Location: Right arm, Patient Position: Sitting, Cuff Size: Adult)   Pulse 80   Temp 97.7 °F (36.5 °C)   Ht 158.8 cm (62.5\")   Wt 92.2 kg (203 lb 3.2 oz)   SpO2 98%   BMI 36.57 kg/m²       04/12/22  1043   Weight: 92.2 kg (203 lb 3.2 oz)       Physical " Exam  Vitals reviewed.   Constitutional:       Appearance: She is obese.   Cardiovascular:      Rate and Rhythm: Normal rate and regular rhythm.   Pulmonary:      Effort: Pulmonary effort is normal.   Skin:     General: Skin is warm and dry.   Neurological:      Mental Status: She is alert and oriented to person, place, and time.   Psychiatric:         Mood and Affect: Mood normal.         Behavior: Behavior normal.         Patient's Body mass index is 36.57 kg/m². indicating that she is morbidly obese (BMI > 40 or > 35 with obesity - related health condition). Obesity-related health conditions include the following: none. Obesity is unchanged. BMI is is above average; BMI management plan is completed. We discussed portion control and increasing exercise..     Assessment/Plan   Diagnoses and all orders for this visit:    1. Class 2 severe obesity due to excess calories with serious comorbidity and body mass index (BMI) of 37.0 to 37.9 in adult (HCC) (Primary)  Comments:  Attritional counseling provided today.  Patient will meet with dietitian today.  Patient has brought in 1 week food journal in for review.    2. Gastroesophageal reflux disease without esophagitis  Comments:  Continue current regimen.  Nutritional counseling provided.         Wendy Newby was seen today for follow-up, obesity, nutrition counseling and weight loss.  She has remained the same weight since her last visit.  Today we discussed healthy changes in lifestyle, diet, and exercise. Dietician consultation obtained.  Wendy Newby had received handouts to her explaining the recommendation on portion sizes/appetite control/reading nutrition labels.   Intensive behavioral therapy for obesity was done today as well.     Goals for this month are:   1.  Increase meals from 3/day to 4/day.  Continue logging food journal.  2. Patient will see dietitian today   3. Encouraged to set timer to eat 4 meals per day.     Follow up in one month for a weight  recheck.A total of 20 minutes was spent face to face with this patient and over half of the time was spent on counseling and coordination of care for the disease of obesity. We specifically reviewed the dietary prescription and I made recommendations toward increasing exercise as tolerated as well as focusing on training their behavior toward storing less.

## 2022-05-01 DIAGNOSIS — R79.89 ELEVATED TESTOSTERONE LEVEL: ICD-10-CM

## 2022-05-02 RX ORDER — SPIRONOLACTONE 25 MG/1
TABLET ORAL
Qty: 60 TABLET | Refills: 4 | OUTPATIENT
Start: 2022-05-02

## 2022-05-10 ENCOUNTER — OFFICE VISIT (OUTPATIENT)
Dept: BARIATRICS/WEIGHT MGMT | Facility: CLINIC | Age: 22
End: 2022-05-10

## 2022-05-10 VITALS
BODY MASS INDEX: 35.9 KG/M2 | WEIGHT: 202.6 LBS | HEIGHT: 63 IN | OXYGEN SATURATION: 98 % | SYSTOLIC BLOOD PRESSURE: 121 MMHG | DIASTOLIC BLOOD PRESSURE: 87 MMHG | HEART RATE: 75 BPM | TEMPERATURE: 99.1 F

## 2022-05-10 DIAGNOSIS — E66.01 CLASS 2 SEVERE OBESITY DUE TO EXCESS CALORIES WITH SERIOUS COMORBIDITY AND BODY MASS INDEX (BMI) OF 37.0 TO 37.9 IN ADULT: Primary | ICD-10-CM

## 2022-05-10 PROCEDURE — 99213 OFFICE O/P EST LOW 20 MIN: CPT | Performed by: NURSE PRACTITIONER

## 2022-05-10 NOTE — ASSESSMENT & PLAN NOTE
Patient's (Body mass index is 36.47 kg/m².) indicates that they are morbidly obese (BMI > 40 or > 35 with obesity - related health condition) with health conditions that include none . Weight is unchanged. BMI is is above average; BMI management plan is completed. We discussed portion control and increasing exercise.

## 2022-05-10 NOTE — PROGRESS NOTES
"Patient Care Team:  Ruben Wheeler MD as PCP - General (Family Medicine)  Bebeto Rivera MD as Consulting Physician (Gastroenterology)  Ruben Wheeler MD as Referring Physician (Family Medicine)  Rufus Duarte DO as Cardiologist (Cardiology)    Reason for Visit:  Medical Weight loss    Subjective   Wendy Newby is a 22 y.o. female.     Wendy is here for follow-up and continued medical management of her morbid obesity.  She is currently on a prescription diet.  Wendy previously was to apply dietary changes such as following the meal plan as directed.  She admits to eating 3-4 meals per day.  As a result she lost weight since her last visit.  She has lost 1 lb since her last appointment. She has increased her water intake to about 60 ounces each day. She states she has weaned off of soda intake and has had about two this month which is improvement.    Admits to drinking around 60 ounces of fluid each day and eating 50 g of protein.  She states that she is exercising 5 days/week for 30 minutes or more intervals by running, weights, walking, and dancing.  She has lost 1 pound since her last appointment with us.      Review Of Systems:  Review of Systems   Constitutional: Negative.    Respiratory: Negative.    Cardiovascular: Negative.    Gastrointestinal: Negative.    Endocrine: Negative.    Musculoskeletal: Negative.    Psychiatric/Behavioral: Negative.          The following portions of the patient's history were reviewed and updated as appropriate: allergies, current medications, past family history, past medical history, past social history, past surgical history, and problem list.    Objective   /87 (BP Location: Right arm, Patient Position: Sitting, Cuff Size: Adult)   Pulse 75   Temp 99.1 °F (37.3 °C)   Ht 158.8 cm (62.5\")   Wt 91.9 kg (202 lb 9.6 oz)   SpO2 98%   BMI 36.47 kg/m²       05/10/22  1006   Weight: 91.9 kg (202 lb 9.6 oz)       Physical Exam  Vitals " reviewed.   Constitutional:       Appearance: She is obese.   Cardiovascular:      Rate and Rhythm: Normal rate and regular rhythm.   Pulmonary:      Effort: Pulmonary effort is normal.   Abdominal:      General: Bowel sounds are normal.      Palpations: Abdomen is soft.   Musculoskeletal:         General: Normal range of motion.   Skin:     General: Skin is warm and dry.   Neurological:      Mental Status: She is alert and oriented to person, place, and time.   Psychiatric:         Mood and Affect: Mood normal.         Behavior: Behavior normal.         Class 2 Severe Obesity (BMI >=35 and <=39.9). Obesity is unchanged. BMI is is above average; BMI management plan is completed. We discussed portion control and increasing exercise.       Diagnoses and all orders for this visit:    1. Class 2 severe obesity due to excess calories with serious comorbidity and body mass index (BMI) of 37.0 to 37.9 in adult (HCC) (Primary)  Assessment & Plan:  Patient's (Body mass index is 36.47 kg/m².) indicates that they are morbidly obese (BMI > 40 or > 35 with obesity - related health condition) with health conditions that include none . Weight is unchanged. BMI is is above average; BMI management plan is completed. We discussed portion control and increasing exercise.          Wendy Newby was seen today for follow-up, obesity, nutrition counseling and weight loss.  She has lost weight since her last visit.  Today we discussed healthy changes in lifestyle, diet, and exercise. Dietician consultation obtained.  Wendy Newby had received handouts to her explaining the recommendation on portion sizes/appetite control/reading nutrition labels.   Intensive behavioral therapy for obesity was done today as well.     Goals for this month are:   1.  Patient has been encouraged to work on increasing vegetable intake preferably at meal 1.  Patient brought a 1 week food journal into her appointment and she is not eating vegetables at her  first meal.  I also encouraged her to continue working towards eliminating group C at her fourth meal.  2.  Patient will meet with dietitian today to further discuss nutritional guidance.  3.  Overall patient has made a lot of improvement.  She is down to 2 sodas per month so I encouraged her to work on fully eliminating soda intake.    Follow up in one month for a weight recheck.A total of 20 minutes was spent face to face with this patient and over half of the time was spent on counseling and coordination of care for the disease of obesity. We specifically reviewed the dietary prescription and I made recommendations toward increasing exercise as tolerated as well as focusing on training their behavior toward storing less.

## 2022-06-09 ENCOUNTER — OFFICE VISIT (OUTPATIENT)
Dept: BARIATRICS/WEIGHT MGMT | Facility: CLINIC | Age: 22
End: 2022-06-09

## 2022-06-09 VITALS — HEIGHT: 63 IN | WEIGHT: 204.4 LBS | BODY MASS INDEX: 36.21 KG/M2

## 2022-06-09 NOTE — PROGRESS NOTES
"Metabolic and Bariatric Surgery Adult Nutrition Assessment    Patient Name: Wendy Newby   YOB: 2000   MRN: [id number]     Assessment Date:  06/09/2022     Reason for Visit: Follow-up Nutrition Assessment     Treatment Pathway: Medical Weight Loss    Assessment    Anthropometrics   Wt Readings from Last 1 Encounters:   06/09/22 92.7 kg (204 lb 6.4 oz)     Ht Readings from Last 1 Encounters:   06/09/22 158.8 cm (62.5\")     BMI Readings from Last 1 Encounters:   06/09/22 36.79 kg/m²       Initial Weight/Date: 203 lb (3/15/22)  Weight Changes since last visit: +1.8 lb   Net Weight Change: +1.4 lb    Past Medical History:   Diagnosis Date   • Allergic     seasonal allergies   • PCOS (polycystic ovarian syndrome)    • PCOS (polycystic ovarian syndrome)    • Vertigo     r/t to severe allergy symptoms      Past Surgical History:   Procedure Laterality Date   • ENDOSCOPY N/A 11/14/2019    Normal exam   • NO PAST SURGERIES        Current Outpatient Medications   Medication Sig Dispense Refill   • cetirizine (zyrTEC) 10 MG tablet Take 10 mg by mouth Daily As Needed for allergies.     • Cholecalciferol (Vitamin D3) 50 MCG (2000 UT) tablet Take  by mouth.     • escitalopram (LEXAPRO) 10 MG tablet TAKE ONE TABLET BY MOUTH DAILY 30 tablet 5   • Ferrous Sulfate (IRON PO) Take  by mouth.     • metoclopramide (REGLAN) 10 MG tablet Take 10 mg by mouth.     • Omega-3 Fatty Acids (OMEGA 3 PO) Take  by mouth.     • omeprazole (priLOSEC) 40 MG capsule Take 40 mg by mouth 2 (two) times a day.     • ondansetron (ZOFRAN) 4 MG tablet Take 4 mg by mouth.     • spironolactone (ALDACTONE) 25 MG tablet TAKE ONE TABLET BY MOUTH TWICE A DAY 60 tablet 4   • vitamin B-12 (CYANOCOBALAMIN) 500 MCG tablet Take  by mouth Daily.     • Zinc Sulfate (ZINC 15 PO) Take  by mouth.     • EPINEPHrine (EPIPEN) 0.3 MG/0.3ML solution auto-injector injection        No current facility-administered medications for this visit.      No Known Allergies "       Motivation for weight loss includes: To be healthier and more confident in body.    Pertinent Social/Behavior/Environmental History: living with parents. Gastroparesis in remission.      Nutrition Recall  Eating 3-4 meals daily   (M1) Smoothies- Spinach, banana, strawberry, high protein yogurt. Sometimes water/almond milk. Going to try a protein powder.   (M2) BANZA pasta with vegetables, sometimes with a protein.  (M3) protein shake with carrots.   (M4) chicken/turkey with roasted vegetables/salad   Snacking - tends to have trouble after dinner- carb- chips/crackers, crunchy.   Monitoring portions- Measuring cups most meals. Not so much   Calculating Protein- estimating ~40 grams protein daily.   Drinking sugary/carbonated beverages- Sparkling water sometimes  Fluid Intake- 70+ ounces daily.       Success this Month: Making smoothies this month. Making conscious effort nightly to think about meals for the next day.   Barriers: Binge eating situations- trigger by stress. Starting therapy this week.     Exercise: Outside walking, weights at home ~5 days.       Nutrition Intervention  Nutrition education and nutrition coaching for behavior change provided.  Strategies used included Motivational Interviewing , Problem Solving, Skill Development for meal planning, measuring portions of foods, tracking intake. and Ongoing reinforcement  Review of medical weight loss prescription 4 meal/day plan and reviewed nutritional needs for Medical Weight Loss.    Diet Changes  Eat 4 meals per day with protein and vegetables at each meal, no carbs after meal 2., Protein goal: 65 gms., Eat vegetables first at each meal., Discussed protein guidelines for shakes and bars., Reduce snacking -use foods from free foods list only., Eliminate snacks., Reduce fat, sugar, and/or salt in food choices., Choose more nutrient dense foods., Choose foods with increased fiber., Monitor portion sizes using a food scale and/or measuring cup.,  Eliminate soda and sugar-sweetened beverages and Increase fluid intake to 64 ounces per day      Goals  1. Getting protein in at meal.   2. Measure all foods at every meal.   3. Track intake with all meals.     Self-monitoring strategies such as keeping a food journal (on paper or electronically) and calculating fluid/protein intake were discussed.    Monitoring/Evaluation Plan  Anticipate follow up per program protocol. Continue collaboration of care with physician and treatment team.     Electronically signed by  Linda Mott RDN, LD  06/09/2022 09:07 CDT.

## 2022-06-21 RX ORDER — ESCITALOPRAM OXALATE 10 MG/1
TABLET ORAL
Qty: 30 TABLET | Refills: 5 | Status: SHIPPED | OUTPATIENT
Start: 2022-06-21 | End: 2022-12-14

## 2022-08-08 DIAGNOSIS — R79.89 ELEVATED TESTOSTERONE LEVEL: ICD-10-CM

## 2022-08-08 RX ORDER — SPIRONOLACTONE 25 MG/1
25 TABLET ORAL 2 TIMES DAILY
Qty: 60 TABLET | Refills: 4 | OUTPATIENT
Start: 2022-08-08

## 2022-08-11 ENCOUNTER — OFFICE VISIT (OUTPATIENT)
Dept: FAMILY MEDICINE CLINIC | Facility: CLINIC | Age: 22
End: 2022-08-11

## 2022-08-11 ENCOUNTER — LAB (OUTPATIENT)
Dept: FAMILY MEDICINE CLINIC | Facility: CLINIC | Age: 22
End: 2022-08-11

## 2022-08-11 VITALS
RESPIRATION RATE: 16 BRPM | TEMPERATURE: 98.7 F | HEIGHT: 63 IN | WEIGHT: 205 LBS | HEART RATE: 70 BPM | SYSTOLIC BLOOD PRESSURE: 128 MMHG | BODY MASS INDEX: 36.32 KG/M2 | DIASTOLIC BLOOD PRESSURE: 72 MMHG

## 2022-08-11 DIAGNOSIS — K31.84 GASTROPARESIS: Primary | ICD-10-CM

## 2022-08-11 PROCEDURE — 99213 OFFICE O/P EST LOW 20 MIN: CPT | Performed by: FAMILY MEDICINE

## 2022-08-11 NOTE — PROGRESS NOTES
Subjective   Wendy Newby is a 22 y.o. female.     Chief Complaint   Patient presents with   • Follow-up     6mo       History of Present Illness     she has managed to wean off reglan and her symtoms are managebloe with zofran--she       Current Outpatient Medications:   •  cetirizine (zyrTEC) 10 MG tablet, Take 10 mg by mouth Daily As Needed for allergies., Disp: , Rfl:   •  Cholecalciferol (Vitamin D3) 50 MCG (2000 UT) tablet, Take  by mouth., Disp: , Rfl:   •  EPINEPHrine (EPIPEN) 0.3 MG/0.3ML solution auto-injector injection, , Disp: , Rfl:   •  escitalopram (LEXAPRO) 10 MG tablet, TAKE ONE TABLET BY MOUTH DAILY, Disp: 30 tablet, Rfl: 5  •  Ferrous Sulfate (IRON PO), Take  by mouth., Disp: , Rfl:   •  metoclopramide (REGLAN) 10 MG tablet, Take 10 mg by mouth., Disp: , Rfl:   •  omeprazole (priLOSEC) 40 MG capsule, Take 40 mg by mouth 2 (two) times a day., Disp: , Rfl:   •  ondansetron (ZOFRAN) 4 MG tablet, Take 4 mg by mouth., Disp: , Rfl:   •  spironolactone (ALDACTONE) 25 MG tablet, TAKE ONE TABLET BY MOUTH TWICE A DAY, Disp: 60 tablet, Rfl: 4  •  vitamin B-12 (CYANOCOBALAMIN) 500 MCG tablet, Take  by mouth Daily., Disp: , Rfl:   •  Zinc Sulfate (ZINC 15 PO), Take  by mouth., Disp: , Rfl:   •  Omega-3 Fatty Acids (OMEGA 3 PO), Take  by mouth., Disp: , Rfl:   No Known Allergies    Class 2 Severe Obesity (BMI >=35 and <=39.9). Obesity-related health conditions include the following: none. Obesity is unchanged. BMI is is above average; BMI management plan is completed. We discussed portion control and increasing exercise.      Past Medical History:   Diagnosis Date   • Allergic     seasonal allergies   • PCOS (polycystic ovarian syndrome)    • PCOS (polycystic ovarian syndrome)    • Vertigo     r/t to severe allergy symptoms     Past Surgical History:   Procedure Laterality Date   • ENDOSCOPY N/A 11/14/2019    Normal exam   • NO PAST SURGERIES         Review of Systems   Constitutional: Negative.    HENT:  "Negative.    Eyes: Negative.    Respiratory: Negative.    Cardiovascular: Negative.    Gastrointestinal: Positive for nausea.   Endocrine: Negative.    Genitourinary: Negative.    Musculoskeletal: Negative.    Skin: Negative.    Allergic/Immunologic: Negative.    Neurological: Negative.    Hematological: Negative.    Psychiatric/Behavioral: Negative.        Objective  /72   Pulse 70   Temp 98.7 °F (37.1 °C)   Resp 16   Ht 158.8 cm (62.52\")   Wt 93 kg (205 lb)   BMI 36.87 kg/m²   Physical Exam  Vitals and nursing note reviewed.   Constitutional:       Appearance: Normal appearance. She is normal weight.   HENT:      Head: Normocephalic and atraumatic.      Nose: Nose normal.      Mouth/Throat:      Mouth: Mucous membranes are moist.   Eyes:      Extraocular Movements: Extraocular movements intact.      Conjunctiva/sclera: Conjunctivae normal.      Pupils: Pupils are equal, round, and reactive to light.   Cardiovascular:      Pulses: Normal pulses.   Pulmonary:      Effort: Pulmonary effort is normal.   Abdominal:      General: Abdomen is flat. Bowel sounds are normal.      Palpations: Abdomen is soft.   Musculoskeletal:         General: Normal range of motion.      Cervical back: Normal range of motion and neck supple.   Skin:     General: Skin is warm.      Capillary Refill: Capillary refill takes less than 2 seconds.   Neurological:      General: No focal deficit present.      Mental Status: She is alert and oriented to person, place, and time. Mental status is at baseline.   Psychiatric:         Mood and Affect: Mood normal.         Assessment & Plan   Diagnoses and all orders for this visit:    1. Gastroparesis (Primary)  -     CBC & Differential  -     Comprehensive metabolic panel      She will contijhue meds for now  [facility] ing [facility] appt in [facility]           Orders Placed This Encounter   Procedures   • Comprehensive metabolic panel     Order Specific Question:   Release to patient     Answer:   " Routine Release   • CBC & Differential       Follow up: 6 month(s)

## 2022-08-12 LAB
ALBUMIN SERPL-MCNC: 4.5 G/DL (ref 3.5–5.2)
ALBUMIN/GLOB SERPL: 1.8 G/DL
ALP SERPL-CCNC: 129 U/L (ref 39–117)
ALT SERPL-CCNC: 26 U/L (ref 1–33)
AST SERPL-CCNC: 28 U/L (ref 1–32)
BASOPHILS # BLD AUTO: 0.05 10*3/MM3 (ref 0–0.2)
BASOPHILS NFR BLD AUTO: 0.6 % (ref 0–1.5)
BILIRUB SERPL-MCNC: 0.4 MG/DL (ref 0–1.2)
BUN SERPL-MCNC: 10 MG/DL (ref 6–20)
BUN/CREAT SERPL: 11.6 (ref 7–25)
CALCIUM SERPL-MCNC: 9.9 MG/DL (ref 8.6–10.5)
CHLORIDE SERPL-SCNC: 101 MMOL/L (ref 98–107)
CO2 SERPL-SCNC: 27 MMOL/L (ref 22–29)
CREAT SERPL-MCNC: 0.86 MG/DL (ref 0.57–1)
EGFRCR SERPLBLD CKD-EPI 2021: 96.9 ML/MIN/1.73
EOSINOPHIL # BLD AUTO: 0.11 10*3/MM3 (ref 0–0.4)
EOSINOPHIL NFR BLD AUTO: 1.3 % (ref 0.3–6.2)
ERYTHROCYTE [DISTWIDTH] IN BLOOD BY AUTOMATED COUNT: 14 % (ref 12.3–15.4)
GLOBULIN SER CALC-MCNC: 2.5 GM/DL
GLUCOSE SERPL-MCNC: 82 MG/DL (ref 65–99)
HCT VFR BLD AUTO: 42.6 % (ref 34–46.6)
HGB BLD-MCNC: 13.7 G/DL (ref 12–15.9)
IMM GRANULOCYTES # BLD AUTO: 0.02 10*3/MM3 (ref 0–0.05)
IMM GRANULOCYTES NFR BLD AUTO: 0.2 % (ref 0–0.5)
LYMPHOCYTES # BLD AUTO: 1.96 10*3/MM3 (ref 0.7–3.1)
LYMPHOCYTES NFR BLD AUTO: 23.7 % (ref 19.6–45.3)
MCH RBC QN AUTO: 27.6 PG (ref 26.6–33)
MCHC RBC AUTO-ENTMCNC: 32.2 G/DL (ref 31.5–35.7)
MCV RBC AUTO: 85.9 FL (ref 79–97)
MONOCYTES # BLD AUTO: 0.65 10*3/MM3 (ref 0.1–0.9)
MONOCYTES NFR BLD AUTO: 7.9 % (ref 5–12)
NEUTROPHILS # BLD AUTO: 5.49 10*3/MM3 (ref 1.7–7)
NEUTROPHILS NFR BLD AUTO: 66.3 % (ref 42.7–76)
NRBC BLD AUTO-RTO: 0 /100 WBC (ref 0–0.2)
PLATELET # BLD AUTO: 328 10*3/MM3 (ref 140–450)
POTASSIUM SERPL-SCNC: 4.5 MMOL/L (ref 3.5–5.2)
PROT SERPL-MCNC: 7 G/DL (ref 6–8.5)
RBC # BLD AUTO: 4.96 10*6/MM3 (ref 3.77–5.28)
SODIUM SERPL-SCNC: 140 MMOL/L (ref 136–145)
WBC # BLD AUTO: 8.28 10*3/MM3 (ref 3.4–10.8)

## 2022-08-12 NOTE — PROGRESS NOTES
SUBJECTIVE:   Elly Choe is a 79 year old female who presents to clinic today for the following health issues:    Hospital Follow-up Visit:    Hospital/Nursing Home/ Rehab Facility: [facility]   Date of Admission: 2/3/2018  Date of Discharge: 2/21/2018  Reason(s) for Admission: Post arthoplasty right hip             Problems taking medications regularly:  None       Medication changes since discharge: None       Problems adhering to non-medication therapy:  None    Summary of hospitalization:  [facility] discharge summary reviewed  Diagnostic Tests/Treatments reviewed.  Follow up needed: none  Other Healthcare Providers Involved in Patient s Care:         Homecare, Care Coordination and Physical Therapy  Update since discharge: improved.     Post Discharge Medication Reconciliation: discharge medications reconciled, continue medications without change.  Plan of care communicated with patient     Coding guidelines for this visit:  Type of Medical   Decision Making Face-to-Face Visit       within 7 Days of discharge Face-to-Face Visit        within 14 days of discharge   Moderate Complexity [id number]   High Complexity [id number]          Improving since discharge  Working with homecare and therapy  Stilling not as much energy or strength as she had prior to surgery  Taking Tylenol as needed for pain-minimal in hip-both knees hurt    Problem list and histories reviewed & adjusted, as indicated.  Additional history: as documented    Patient Active Problem List   Diagnosis     Gastroesophageal reflux disease without esophagitis     Hemangioma     Benign essential hypertension     surgical menopause (SARAH BSO) for non-cancerous reasons     Morbid obesity due to excess calories (H)     Idiopathic chronic gout of multiple sites without tophus     Chronic airway obstruction (H)     Hyperlipidemia LDL goal <100     CKD (chronic kidney disease) stage 3, GFR 30-59 ml/min     Advanced directives,  I called and let patient know that her labs are good. Patient voiced understanding,  counseling/discussion     Acquired hypothyroidism     DJD of shoulder     Primary localized osteoarthrosis, lower leg     Generalized anxiety disorder     Health Care Home     Coronary artery disease involving native coronary artery of native heart without angina pectoris     FH: rheumatoid arthritis     Osteopenia     Status post total replacement of right hip     Degenerative joint disease (DJD) of hip     Postoperative anemia     Past Surgical History:   Procedure Laterality Date     ARTHROPLASTY HIP Right 1/31/2018    Procedure: ARTHROPLASTY HIP;  Right total hip arthroplasty. ;  Surgeon: Sen Parry MD;  Location: [facility]     ARTHROPLASTY KNEE  4/13/2011    Procedure:ARTHROPLASTY KNEE; Surgeon:SEN PARRY; Location:[facility]     ARTHROPLASTY KNEE  12/26/2012    Procedure: ARTHROPLASTY KNEE;  Left Total Knee Arthroplasty;  Surgeon: Sen Parry MD;  Location: [facility]     ARTHROPLASTY SHOULDER  9/26/2012    Procedure: ARTHROPLASTY SHOULDER;  Right total shoulder arthroplasty;  Surgeon: Sen Parry MD;  Location: [facility]     ARTHROPLASTY SHOULDER Left 5/4/2016    Procedure: ARTHROPLASTY SHOULDER;  Surgeon: Sen Parry MD;  Location: [facility]     ARTHROSCOPY KNEE IRRIGATION AND DEBRIDEMENT  12/10/2010    ARTHROSCOPY KNEE IRRIGATION AND DEBRIDEMENT performed by LEY, JEFFREY DUANE at [facility]     CATARACT IOL, RT/LT  4/2010    bilateral     COLONOSCOPY  2002     COLONOSCOPY  6/19/2014    Procedure: COLONOSCOPY;  Surgeon: Steve Deng MD;  Location: [facility]     CORONARY ANGIOGRAPHY ADULT ORDER       HYSTERECTOMY, SARAH  1976    Total abdominal hysterectomy & bilateral salpingectomy oophorectomy     TONSILLECTOMY & ADENOIDECTOMY      as child       Social History   Substance Use Topics     Smoking status: Former Smoker     Quit date: 1/1/1970     Smokeless tobacco: Never Used      Comment: 20 year hx of smoking 3 packs of cigarettes daily; quit 1970     Alcohol use No     Family History   Problem  "Relation Age of Onset     Alcohol/Drug Father      DIABETES Brother      DIABETES Brother      Arthritis Mother 20     Other - See Comments Daughter      Fallopian tube infection 06/2015         Current Outpatient Prescriptions   Medication Sig Dispense Refill     aspirin 325 MG tablet Take 1 tablet (325 mg) by mouth daily 40 tablet 0     allopurinol (ZYLOPRIM) 100 MG tablet TAKE ONE TABLET BY MOUTH EVERY DAY 90 tablet 3     Acetaminophen (TYLENOL PO) Take 325-650 mg by mouth every 4 hours as needed for mild pain or fever        levothyroxine (SYNTHROID/LEVOTHROID) 75 MCG tablet Take 1 tablet (75 mcg) by mouth daily 90 tablet 3     multivitamin, therapeutic with minerals (MULTI-VITAMIN) TABS Take 1 tablet by mouth daily 100 tablet 3     Allergies   Allergen Reactions     Sulfa Drugs Other (See Comments)     Causes burning when urinates. Causes yeast infections.     Labs reviewed in EPIC    Reviewed and updated as needed this visit by clinical staff       Reviewed and updated as needed this visit by Provider         ROS:  Constitutional, HEENT, cardiovascular, pulmonary, gi and gu systems are negative, except as otherwise noted.    OBJECTIVE:     /72 (Cuff Size: Child)  Pulse 72  Temp 98.5  F (36.9  C) (Tympanic)  Ht 5' 3\" (1.6 m)  Wt 227 lb (103 kg)  LMP 01/14/2013  BMI 40.21 kg/m2  Body mass index is 40.21 kg/(m^2).  GENERAL: healthy, alert and no distress  NECK: no adenopathy  RESP: lungs clear to auscultation - no rales, rhonchi or wheezes  CV: regular rate and rhythm, normal S1 S2, no S3 or S4, no murmur, click or rub, 1+ peripheral edema and peripheral pulses strong  ABDOMEN: soft, nontender, no hepatosplenomegaly, no masses and bowel sounds normal  SKIN: scar - right hip, healed no erythema or drainage present  NEURO: Normal strength and tone, mentation intact and speech normal  PSYCH: mentation appears normal, affect normal/bright    Diagnostic Test Results:  none     ASSESSMENT/PLAN:     1. " Hospital discharge follow-up  Doing well since discharge.  Continue to work with therapy for conditioning.  POLST filled out in clinic following discussion of wishes.    2. Status post total replacement of right hip  Doing well following surgery.    Home care instructions were reviewed with the patient. The risks, benefits and treatment options of prescribed medications or other treatments have been discussed with the patient. The patient verbalized their understanding and should call or follow up if no improvement or if they develop further problems.    ROSA Patel [facility]

## 2022-08-19 DIAGNOSIS — R79.89 ELEVATED TESTOSTERONE LEVEL: ICD-10-CM

## 2022-08-24 ENCOUNTER — OFFICE VISIT (OUTPATIENT)
Dept: OBSTETRICS AND GYNECOLOGY | Facility: CLINIC | Age: 22
End: 2022-08-24

## 2022-08-24 VITALS
DIASTOLIC BLOOD PRESSURE: 80 MMHG | HEIGHT: 62 IN | BODY MASS INDEX: 38.28 KG/M2 | SYSTOLIC BLOOD PRESSURE: 128 MMHG | WEIGHT: 208 LBS

## 2022-08-24 DIAGNOSIS — E28.2 PCOS (POLYCYSTIC OVARIAN SYNDROME): Primary | ICD-10-CM

## 2022-08-24 PROCEDURE — 99214 OFFICE O/P EST MOD 30 MIN: CPT | Performed by: NURSE PRACTITIONER

## 2022-08-24 NOTE — PROGRESS NOTES
Subjective   Wendy Newby is a 22 y.o. female  YOB: 2000      Chief Complaint   Patient presents with   • Med Refill     Patient is here for PCOS and refill of her spironolactone patient states she has not had a period in over a year.        Patient here for repeat labs and dose adjustment of PCOS medications.      The following portions of the patient's history were reviewed and updated as appropriate: allergies, current medications, past family history, past medical history, past social history, past surgical history, and problem list.    No Known Allergies    Past Medical History:   Diagnosis Date   • Allergic     seasonal allergies   • PCOS (polycystic ovarian syndrome)    • PCOS (polycystic ovarian syndrome)    • Vertigo     r/t to severe allergy symptoms       Family History   Problem Relation Age of Onset   • Diabetes Paternal Grandmother    • No Known Problems Father    • No Known Problems Mother    • No Known Problems Sister    • Breast cancer Neg Hx    • Ovarian cancer Neg Hx    • Colon cancer Neg Hx    • Colon polyps Neg Hx        Social History     Socioeconomic History   • Marital status: Single   Tobacco Use   • Smoking status: Never Smoker   • Smokeless tobacco: Never Used   Vaping Use   • Vaping Use: Never used   Substance and Sexual Activity   • Alcohol use: No   • Drug use: No   • Sexual activity: Never     Birth control/protection: OCP         Current Outpatient Medications:   •  cetirizine (zyrTEC) 10 MG tablet, Take 10 mg by mouth Daily As Needed for allergies., Disp: , Rfl:   •  Cholecalciferol (Vitamin D3) 50 MCG (2000 UT) tablet, Take  by mouth., Disp: , Rfl:   •  EPINEPHrine (EPIPEN) 0.3 MG/0.3ML solution auto-injector injection, , Disp: , Rfl:   •  escitalopram (LEXAPRO) 10 MG tablet, TAKE ONE TABLET BY MOUTH DAILY, Disp: 30 tablet, Rfl: 5  •  Ferrous Sulfate (IRON PO), Take  by mouth., Disp: , Rfl:   •  metoclopramide (REGLAN) 10 MG tablet, Take 10 mg by mouth., Disp: ,  Rfl:   •  omeprazole (priLOSEC) 40 MG capsule, Take 40 mg by mouth 2 (two) times a day., Disp: , Rfl:   •  ondansetron (ZOFRAN) 4 MG tablet, Take 4 mg by mouth., Disp: , Rfl:   •  spironolactone (ALDACTONE) 25 MG tablet, TAKE ONE TABLET BY MOUTH TWICE A DAY, Disp: 60 tablet, Rfl: 4  •  vitamin B-12 (CYANOCOBALAMIN) 500 MCG tablet, Take  by mouth Daily., Disp: , Rfl:   •  Zinc Sulfate (ZINC 15 PO), Take  by mouth., Disp: , Rfl:   •  Omega-3 Fatty Acids (OMEGA 3 PO), Take  by mouth., Disp: , Rfl:     No LMP recorded.    Sexual History:         Could not be calculated    Past Surgical History:   Procedure Laterality Date   • ENDOSCOPY N/A 11/14/2019    Normal exam   • NO PAST SURGERIES         Review of Systems   Constitutional: Negative for activity change, appetite change, chills, diaphoresis, fatigue, fever and unexpected weight change.   HENT: Negative for congestion, dental problem, drooling, ear discharge, ear pain, facial swelling, hearing loss, mouth sores, nosebleeds, postnasal drip, rhinorrhea, sinus pressure, sinus pain, sneezing, sore throat, tinnitus, trouble swallowing and voice change.    Eyes: Negative for photophobia, pain, discharge, redness, itching and visual disturbance.   Respiratory: Negative for apnea, cough, choking, chest tightness, shortness of breath, wheezing and stridor.    Cardiovascular: Negative for chest pain, palpitations and leg swelling.   Gastrointestinal: Negative for abdominal distention, abdominal pain, anal bleeding, blood in stool, constipation, diarrhea, nausea, rectal pain and vomiting.   Endocrine: Negative for cold intolerance, heat intolerance, polydipsia, polyphagia and polyuria.   Genitourinary: Negative for decreased urine volume, difficulty urinating, dyspareunia, dysuria, enuresis, flank pain, frequency, genital sores, hematuria, menstrual problem, pelvic pain, urgency, vaginal bleeding, vaginal discharge and vaginal pain.   Musculoskeletal: Negative for arthralgias,  "back pain, gait problem, joint swelling, myalgias, neck pain and neck stiffness.   Skin: Negative for color change, pallor, rash and wound.   Allergic/Immunologic: Negative for environmental allergies, food allergies and immunocompromised state.   Neurological: Negative for dizziness, tremors, seizures, syncope, facial asymmetry, speech difficulty, weakness, light-headedness, numbness and headaches.   Hematological: Negative for adenopathy. Does not bruise/bleed easily.   Psychiatric/Behavioral: Negative for agitation, behavioral problems, confusion, decreased concentration, dysphoric mood, hallucinations, self-injury, sleep disturbance and suicidal ideas. The patient is not nervous/anxious and is not hyperactive.        Objective   Physical Exam  Vitals and nursing note reviewed.   Constitutional:       Appearance: She is well-developed.   HENT:      Head: Normocephalic.   Eyes:      Pupils: Pupils are equal, round, and reactive to light.   Cardiovascular:      Rate and Rhythm: Normal rate and regular rhythm.   Pulmonary:      Effort: Pulmonary effort is normal.      Breath sounds: Normal breath sounds.   Abdominal:      Palpations: Abdomen is soft.   Musculoskeletal:         General: Normal range of motion.      Cervical back: Normal range of motion.   Skin:     General: Skin is warm and dry.   Neurological:      Mental Status: She is alert and oriented to person, place, and time.   Psychiatric:         Behavior: Behavior normal.           Vitals:    08/24/22 1055   BP: 128/80   Weight: 94.3 kg (208 lb)   Height: 158.1 cm (62.25\")       Diagnoses and all orders for this visit:    1. PCOS (polycystic ovarian syndrome) (Primary)  Comments:  Patient to do fasting PCOS labs.  We will use results to reevaluate current dosages.  RTO for fasting labs and recheck in 3 months.  Orders:  -     Insulin, Total  -     Testosterone  -     DHEA-Sulfate          Class 2 Severe Obesity (BMI >=35 and <=39.9). Obesity-related health " conditions include the following: GERD. Obesity is unchanged. BMI is is above average; BMI management plan is completed. We discussed low calorie, low carb based diet program, portion control and increasing exercise.             Non-Smoker    MyChart Instructions Given

## 2022-08-25 LAB
DHEA-S SERPL-MCNC: 194 UG/DL (ref 110–431.7)
INSULIN SERPL-ACNC: 22.4 UIU/ML (ref 2.6–24.9)
TESTOST SERPL-MCNC: 44 NG/DL (ref 13–71)

## 2022-08-26 RX ORDER — SPIRONOLACTONE 25 MG/1
TABLET ORAL
Qty: 60 TABLET | Refills: 4 | Status: SHIPPED | OUTPATIENT
Start: 2022-08-26 | End: 2023-01-19

## 2022-09-01 ENCOUNTER — OFFICE VISIT (OUTPATIENT)
Dept: BARIATRICS/WEIGHT MGMT | Facility: CLINIC | Age: 22
End: 2022-09-01

## 2022-09-01 VITALS
WEIGHT: 210 LBS | OXYGEN SATURATION: 96 % | DIASTOLIC BLOOD PRESSURE: 84 MMHG | TEMPERATURE: 98.2 F | SYSTOLIC BLOOD PRESSURE: 135 MMHG | HEIGHT: 63 IN | HEART RATE: 91 BPM | BODY MASS INDEX: 37.21 KG/M2

## 2022-09-01 DIAGNOSIS — E66.01 CLASS 2 SEVERE OBESITY DUE TO EXCESS CALORIES WITH SERIOUS COMORBIDITY AND BODY MASS INDEX (BMI) OF 37.0 TO 37.9 IN ADULT: Primary | ICD-10-CM

## 2022-09-01 DIAGNOSIS — K21.9 GASTROESOPHAGEAL REFLUX DISEASE WITHOUT ESOPHAGITIS: ICD-10-CM

## 2022-09-01 PROCEDURE — 99213 OFFICE O/P EST LOW 20 MIN: CPT | Performed by: NURSE PRACTITIONER

## 2022-09-01 NOTE — ASSESSMENT & PLAN NOTE
Patient's (Body mass index is 37.8 kg/m².) indicates that they are morbidly obese (BMI > 40 or > 35 with obesity - related health condition) with health conditions that include GERD . Weight is worsening. BMI is is above average; BMI management plan is completed. We discussed portion control and increasing exercise.

## 2022-09-01 NOTE — PROGRESS NOTES
"Patient Care Team:  Ruben Wheeler MD as PCP - General (Family Medicine)  Bebeto Rivera MD as Consulting Physician (Gastroenterology)  Ruben Wheeler MD as Referring Physician (Family Medicine)  Rufus Duarte DO as Cardiologist (Cardiology)    Reason for Visit:  Surgical Weight loss    Subjective   Wendy Newby is a 22 y.o. female.     Wendy is here for follow-up and continued medical management of her morbid obesity.  She is currently on a prescription diet.  Wendy previously was to apply dietary changes such as following the meal plan as directed.  She admits to 4 meals per day.  As a result she gained weight since her last visit.    She admits to drinking 60 to 70 ounces of fluid each day and eating around 40 g of protein each day.  She states that she is exercising about every day for 30 minutes or more by walking, running, and weight training.  She denies nicotine use and denies soda intake.    Review Of Systems:  Review of Systems   Constitutional: Negative.    Respiratory: Negative.    Cardiovascular: Negative.    Gastrointestinal: Negative.    Endocrine: Negative.    Musculoskeletal: Negative.    Psychiatric/Behavioral: Positive for sleep disturbance.         The following portions of the patient's history were reviewed and updated as appropriate: allergies, current medications, past family history, past medical history, past social history, past surgical history, and problem list.    Objective   /84 (BP Location: Right arm, Patient Position: Sitting, Cuff Size: Adult)   Pulse 91   Temp 98.2 °F (36.8 °C)   Ht 158.8 cm (62.5\")   Wt 95.3 kg (210 lb)   SpO2 96%   BMI 37.80 kg/m²       09/01/22  1126   Weight: 95.3 kg (210 lb)       Physical Exam  Vitals reviewed.   Constitutional:       Appearance: She is obese.   Cardiovascular:      Rate and Rhythm: Normal rate and regular rhythm.   Pulmonary:      Effort: Pulmonary effort is normal.   Skin:     General: Skin is " warm and dry.   Neurological:      Mental Status: She is alert and oriented to person, place, and time.   Psychiatric:         Mood and Affect: Mood normal.         Behavior: Behavior normal.         Class 2 Severe Obesity (BMI >=35 and <=39.9). Obesity-related health conditions include the following: GERD. Obesity is worsening. BMI is is above average; BMI management plan is completed. We discussed portion control and increasing exercise.     Assessment & Plan   Diagnoses and all orders for this visit:    1. Class 2 severe obesity due to excess calories with serious comorbidity and body mass index (BMI) of 37.0 to 37.9 in adult (HCC) (Primary)  Assessment & Plan:  Patient's (Body mass index is 37.8 kg/m².) indicates that they are morbidly obese (BMI > 40 or > 35 with obesity - related health condition) with health conditions that include GERD . Weight is worsening. BMI is is above average; BMI management plan is completed. We discussed portion control and increasing exercise.       2. Gastroesophageal reflux disease without esophagitis       Wendy Newby was seen today for follow-up, obesity, nutrition counseling and weight loss.  She has gained weight since her last visit.  Today we discussed healthy changes in lifestyle, diet, and exercise. Dietician consultation obtained.  Wendy Newby had received handouts to her explaining the recommendation on portion sizes/appetite control/reading nutrition labels.   Intensive behavioral therapy for obesity was done today as well.     Goals for this month are:   1. Set eating times; discussed the importance of planning when she would eat each day  2. Set a stop eating time to assist with evening snacking and grazing  3. Utilize free food list when snacking     Follow up in one month for a weight recheck.A total of 20 minutes was spent face to face with this patient and over half of the time was spent on counseling and coordination of care for the disease of obesity. We  specifically reviewed the dietary prescription and I made recommendations toward increasing exercise as tolerated as well as focusing on training their behavior toward storing less.

## 2022-09-15 ENCOUNTER — TELEPHONE (OUTPATIENT)
Dept: OBSTETRICS AND GYNECOLOGY | Facility: CLINIC | Age: 22
End: 2022-09-15

## 2022-10-05 ENCOUNTER — TELEPHONE (OUTPATIENT)
Dept: FAMILY MEDICINE CLINIC | Facility: CLINIC | Age: 22
End: 2022-10-05

## 2022-10-05 NOTE — TELEPHONE ENCOUNTER
----- Message from Leonora Wyatt MA sent at 10/5/2022  3:26 PM CDT -----  Regarding: FW: Symptoms    ----- Message -----  From: Wendy Newby  Sent: 10/5/2022   3:19 PM CDT  To: [facility]  Subject: Symptoms                                         Dear Dr. Wheeler,    As of a couple of weeks ago, I've been having some alarming symptoms.  I've been having lightheadedness, headache,  dizziness, out of breath when standing or bending over, and exercise intolerance. I was able to go for a 2 mile run 2ish weeks ago, and now I get out of breath when I go for a walk. Some of these symptoms match up with autonomic disfunction, and I'm wondering if that's not  what's going on here. I know it can develop after having covid, which I did have back in July of this year.     Let me know what we should do next.   Thanks,  Wendy Nguyen--can u come in and see me?

## 2022-10-12 ENCOUNTER — OFFICE VISIT (OUTPATIENT)
Dept: FAMILY MEDICINE CLINIC | Facility: CLINIC | Age: 22
End: 2022-10-12

## 2022-10-12 VITALS
HEART RATE: 104 BPM | OXYGEN SATURATION: 98 % | HEIGHT: 63 IN | WEIGHT: 212 LBS | BODY MASS INDEX: 37.56 KG/M2 | SYSTOLIC BLOOD PRESSURE: 138 MMHG | DIASTOLIC BLOOD PRESSURE: 80 MMHG

## 2022-10-12 DIAGNOSIS — R00.2 PALPITATIONS: ICD-10-CM

## 2022-10-12 DIAGNOSIS — R07.9 CHEST PAIN, UNSPECIFIED TYPE: ICD-10-CM

## 2022-10-12 DIAGNOSIS — R06.09 EXERTIONAL DYSPNEA: Primary | ICD-10-CM

## 2022-10-12 PROCEDURE — 99213 OFFICE O/P EST LOW 20 MIN: CPT | Performed by: FAMILY MEDICINE

## 2022-10-12 NOTE — PROGRESS NOTES
Subjective   Wendy Newby is a 22 y.o. female.     Chief Complaint   Patient presents with   • Rapid Heart Rate       History of Present Illness     here today wiht her father---she had a brief epsidoes of loss of conciousness while siting in class-Twiigg bieing post ictal--it coccures around 10:30 and she didnt eat breakfast      Current Outpatient Medications:   •  cetirizine (zyrTEC) 10 MG tablet, Take 10 mg by mouth Daily As Needed for allergies., Disp: , Rfl:   •  EPINEPHrine (EPIPEN) 0.3 MG/0.3ML solution auto-injector injection, , Disp: , Rfl:   •  escitalopram (LEXAPRO) 10 MG tablet, TAKE ONE TABLET BY MOUTH DAILY, Disp: 30 tablet, Rfl: 5  •  Ferrous Sulfate (IRON PO), Take  by mouth., Disp: , Rfl:   •  metoclopramide (REGLAN) 10 MG tablet, Take 10 mg by mouth., Disp: , Rfl:   •  omeprazole (priLOSEC) 40 MG capsule, Take 40 mg by mouth 2 (two) times a day., Disp: , Rfl:   •  ondansetron (ZOFRAN) 4 MG tablet, Take 4 mg by mouth., Disp: , Rfl:   •  spironolactone (ALDACTONE) 25 MG tablet, TAKE ONE TABLET BY MOUTH TWICE A DAY, Disp: 60 tablet, Rfl: 4  •  Cholecalciferol (Vitamin D3) 50 MCG (2000 UT) tablet, Take  by mouth., Disp: , Rfl:   •  vitamin B-12 (CYANOCOBALAMIN) 500 MCG tablet, Take  by mouth Daily., Disp: , Rfl:   •  Zinc Sulfate (ZINC 15 PO), Take  by mouth., Disp: , Rfl:   No Known Allergies    Class 2 Severe Obesity (BMI >=35 and <=39.9). Obesity-related health conditions include the following: none. Obesity is unchanged. BMI is is above average; BMI management plan is completed. We discussed portion control and increasing exercise.      Past Medical History:   Diagnosis Date   • Allergic     seasonal allergies   • PCOS (polycystic ovarian syndrome)    • PCOS (polycystic ovarian syndrome)    • Vertigo     r/t to severe allergy symptoms     Past Surgical History:   Procedure Laterality Date   • ENDOSCOPY N/A 11/14/2019    Normal exam   • NO PAST SURGERIES         Review of Systems   Constitutional:  "Negative.    HENT: Negative.    Eyes: Negative.    Respiratory: Positive for shortness of breath.    Cardiovascular: Positive for chest pain and palpitations.   Gastrointestinal: Negative.    Endocrine: Negative.    Genitourinary: Negative.    Musculoskeletal: Negative.    Skin: Negative.    Allergic/Immunologic: Negative.    Neurological: Negative.    Hematological: Negative.    Psychiatric/Behavioral: Negative.        Objective  /80   Pulse 104   Ht 158.8 cm (62.5\")   Wt 96.2 kg (212 lb)   SpO2 98%   BMI 38.16 kg/m²   Physical Exam  Vitals and nursing note reviewed.   Constitutional:       Appearance: Normal appearance. She is normal weight.   HENT:      Head: Normocephalic and atraumatic.      Nose: Nose normal.      Mouth/Throat:      Mouth: Mucous membranes are moist.   Eyes:      Extraocular Movements: Extraocular movements intact.      Conjunctiva/sclera: Conjunctivae normal.      Pupils: Pupils are equal, round, and reactive to light.   Cardiovascular:      Rate and Rhythm: Normal rate and regular rhythm.      Pulses: Normal pulses.      Heart sounds: Normal heart sounds.   Pulmonary:      Effort: Pulmonary effort is normal.      Breath sounds: Normal breath sounds.   Abdominal:      General: Abdomen is flat. Bowel sounds are normal.      Palpations: Abdomen is soft.   Musculoskeletal:         General: Normal range of motion.      Cervical back: Normal range of motion.   Skin:     General: Skin is warm and dry.      Capillary Refill: Capillary refill takes less than 2 seconds.   Neurological:      General: No focal deficit present.      Mental Status: She is alert and oriented to person, place, and time. Mental status is at baseline.   Psychiatric:         Mood and Affect: Mood normal.         Behavior: Behavior normal.         Thought Content: Thought content normal.         Judgment: Judgment normal.         Assessment & Plan   Diagnoses and all orders for this visit:    1. Exertional dyspnea " (Primary)  -     Ambulatory Referral to Cardiology    2. Chest pain, unspecified type  -     Ambulatory Referral to Cardiology    3. Palpitations  -     Ambulatory Referral to Cardiology                 Orders Placed This Encounter   Procedures   • Ambulatory Referral to Cardiology     Referral Priority:   Routine     Referral Type:   Consultation     Referral Reason:   Specialty Services Required     Referred to Provider:   Arthur Kramer MD     Requested Specialty:   Cardiology     Number of Visits Requested:   1       Follow up: 4 month(s)

## 2022-10-18 ENCOUNTER — OFFICE VISIT (OUTPATIENT)
Dept: CARDIOLOGY CLINIC | Age: 22
End: 2022-10-18
Payer: COMMERCIAL

## 2022-10-18 VITALS
HEIGHT: 61 IN | OXYGEN SATURATION: 94 % | WEIGHT: 211 LBS | SYSTOLIC BLOOD PRESSURE: 140 MMHG | HEART RATE: 96 BPM | DIASTOLIC BLOOD PRESSURE: 96 MMHG | BODY MASS INDEX: 39.84 KG/M2

## 2022-10-18 DIAGNOSIS — R00.2 PALPITATIONS: Primary | ICD-10-CM

## 2022-10-18 PROCEDURE — 99214 OFFICE O/P EST MOD 30 MIN: CPT | Performed by: INTERNAL MEDICINE

## 2022-10-18 PROCEDURE — 93000 ELECTROCARDIOGRAM COMPLETE: CPT | Performed by: INTERNAL MEDICINE

## 2022-10-18 RX ORDER — FERROUS SULFATE 325(65) MG
325 TABLET ORAL
COMMUNITY

## 2022-10-18 ASSESSMENT — ENCOUNTER SYMPTOMS
BLOOD IN STOOL: 0
SORE THROAT: 0
CONSTIPATION: 0
EYE PAIN: 0
VOMITING: 0
CHEST TIGHTNESS: 0
SHORTNESS OF BREATH: 0
EYE DISCHARGE: 0
DIARRHEA: 0
APNEA: 0
ABDOMINAL DISTENTION: 0
FACIAL SWELLING: 0
WHEEZING: 0
COUGH: 0
EYE REDNESS: 0
NAUSEA: 0
ABDOMINAL PAIN: 0

## 2022-10-18 NOTE — PROGRESS NOTES
[place]  [facility] [place]  [id number]  Phone: [phone]  Fax: [phone]                            Date:  10/18/2022  Patient: Adelina Cordova  Age:  23 y.o., 2000    Referral: Orion Barrera MD    REASON FOR VISIT:  Follow-up (Palpitations)         PROBLEM LIST:    Patient Active Problem List    Diagnosis Date Noted    Palpitations 05/13/2021     Overview Note:     Probable extrasystoles, consider PACs, PVCs are both           PRESENTATION: Adelina Cordova is a 23y.o. year old female is seen today for routine annual follow-up appointment. She was last seen in this office in June 2021. Her history is notable for chronic intermittent palpitations. She had a cardiac monitor which demonstrated sinus rhythm at baseline with PVCs. Last echocardiogram in May of last year was largely unremarkable with no major structural heart disease identified. Twelve-lead EKG today shows a pattern consistent with sinus rhythm 74 bpm with no evidence of injury or ischemia or significant arrhythmias. Her medications were reviewed and notable for spironolactone, iron sulfate and Lexapro. Her last labs in August of this year were notable for normal CBC, electrolytes and renal function and largely unremarkable LFTs. Last known TSH within normal limits. Today she reports that in the last few weeks she has been having intermittent episodes of lightheadedness associated with tachycardia, dizziness and headaches as well as atypical random onset chest pain. She also describes 1 episode of passing out which she attributed to hypoglycemia as she had not eaten that morning. She did not seek emergency room attention. REVIEW OF SYSTEMS:  Review of Systems   Constitutional:  Negative for chills, fatigue and fever. HENT:  Negative for congestion, facial swelling, hearing loss and sore throat. Eyes:  Negative for pain, discharge, redness and visual disturbance.    Respiratory: Negative for apnea, cough, chest tightness, shortness of breath and wheezing. Cardiovascular:  Positive for palpitations. Negative for chest pain and leg swelling. Gastrointestinal:  Negative for abdominal distention, abdominal pain, blood in stool, constipation, diarrhea, nausea and vomiting. Endocrine: Negative for polydipsia, polyphagia and polyuria. Genitourinary:  Negative for dysuria, flank pain, frequency and hematuria. Musculoskeletal:  Negative for joint swelling, myalgias and neck pain. Skin:  Negative for pallor and rash. Neurological:  Negative for dizziness, syncope, speech difficulty, light-headedness, numbness and headaches. Psychiatric/Behavioral:  Negative for confusion, hallucinations and sleep disturbance. Past Medical History:      Diagnosis Date    Gastroparesis     Near syncope     Palpitation     PCOS (polycystic ovarian syndrome)     Seasonal allergies     Vertigo     related to severe allergy syndrome        Past Surgical History:      Procedure Laterality Date    SINUS ENDOSCOPY      2019       Medications:  Current Outpatient Medications   Medication Sig Dispense Refill    ferrous sulfate (IRON 325) 325 (65 Fe) MG tablet Take 325 mg by mouth daily (with breakfast)      escitalopram (LEXAPRO) 10 MG tablet Take 10 mg by mouth daily       omeprazole (PRILOSEC) 40 MG delayed release capsule Take 40 mg by mouth 2 times daily      ondansetron (ZOFRAN) 4 MG tablet Take 4 mg by mouth every 8 hours as needed       spironolactone (ALDACTONE) 25 MG tablet Take 25 mg by mouth daily       cetirizine (ZYRTEC) 10 MG tablet Take 10 mg by mouth as needed for Allergies      erythromycin base (E-MYCIN) 250 MG tablet Take 250 mg by mouth 3 times daily (Patient not taking: Reported on 10/18/2022)       No current facility-administered medications for this visit. Allergies:  Patient has no known allergies.     Social History:  Social History     Occupational History    Not on file Tobacco Use    Smoking status: Never    Smokeless tobacco: Never   Substance and Sexual Activity    Alcohol use: No    Drug use: No    Sexual activity: Never         Family History:       Problem Relation Age of Onset    Heart Disease Father     Atrial Fibrillation Father          Physical Examination:  BP (!) 140/96 (Site: Left Upper Arm, Position: Standing)   Pulse 96   Ht 5' 1\" (1.549 m)   Wt 211 lb (95.7 kg)   SpO2 94%   BMI 39.87 kg/m²   Physical Exam  Vitals reviewed. Constitutional:       General: She is not in acute distress. Appearance: Normal appearance. She is not ill-appearing, toxic-appearing or diaphoretic. HENT:      Head: Normocephalic and atraumatic. Eyes:      General: No scleral icterus. Right eye: No discharge. Left eye: No discharge. Conjunctiva/sclera: Conjunctivae normal.   Neck:      Vascular: No carotid bruit. Cardiovascular:      Rate and Rhythm: Normal rate and regular rhythm. No extrasystoles are present. Chest Wall: PMI is not displaced. No thrill. Heart sounds: S1 normal and S2 normal. No murmur heard. No friction rub. No gallop. Pulmonary:      Effort: Pulmonary effort is normal. No tachypnea or respiratory distress. Breath sounds: Normal breath sounds. No stridor. No wheezing, rhonchi or rales. Chest:      Chest wall: No tenderness. Abdominal:      General: Bowel sounds are normal. There is no distension. Palpations: Abdomen is soft. There is no mass. Tenderness: There is no abdominal tenderness. There is no guarding. Musculoskeletal:         General: No swelling. Cervical back: Normal range of motion and neck supple. No rigidity. Right lower leg: No edema. Left lower leg: No edema. Skin:     General: Skin is warm and dry. Coloration: Skin is not jaundiced. Findings: No erythema or rash. Neurological:      General: No focal deficit present.       Mental Status: She is alert and oriented to person, place, and time. Mental status is at baseline. Psychiatric:         Mood and Affect: Mood normal.         Behavior: Behavior normal.         Thought Content: Thought content normal.         Labs:   CBC: No results found for: CBC   BMP: No results found for: BMP    BNP: No results found for: BNPINT   PT/INR: No results found for: PROTIME, INR, INR    APTT:  No results found for: APTT   CARDIAC ENZYMES:   Troponin   Date Value Ref Range Status   10/12/2017 <0.01 0.00 - 0.03 ng/mL Final     Comment:     <0.030 ng/ml       No measurable cardiac damage. 0.030-0.099 ng/ml  Values of troponin in this range suggest  possible myocardial damage. Repeat assay  4 to 6 hours after the current specimen.    >= 0.100 ng/ml     Indicative of myocardial damage. Recommend comtinued monitoring of  patient status and cardiac markers. LIPID PANEL: No results found for: LIPIDPAN  LIVER PROFILE:   AST   Date Value Ref Range Status   10/12/2017 22 5 - 32 U/L Final     ALT   Date Value Ref Range Status   10/12/2017 13 5 - 33 U/L Final     Albumin   Date Value Ref Range Status   10/12/2017 4.7 3.5 - 5.2 g/dL Final              Imaging:    - EKG : Normal sinus rhythm 74 bpm.        ASSESSMENT and PLAN:    Possible postural orthostatic tachycardia syndrome  Palpitations with history of PVCs  Obesity, BMI 40  PCOS, on spironolactone therapy    Tilt table test  Encourage oral hydration with low calorie, electrolyte rich drinks  Further recommendations will follow              Electronically signed by Romana Robinson, MD on 10/18/2022 at 10:06 AM    Romana Robinson, MD, [place], [facility]  Noninvasive Cardiology Consultant    This dictation was generated by voice recognition computer software. Although all attempts are made to edit the dictation for accuracy, there may be errors in the transcription that are not intended.

## 2022-10-25 DIAGNOSIS — Z80.3 FAMILY HISTORY OF BREAST CANCER: Primary | ICD-10-CM

## 2022-10-26 ENCOUNTER — HOSPITAL ENCOUNTER (OUTPATIENT)
Dept: NON INVASIVE DIAGNOSTICS | Age: 22
Discharge: HOME | End: 2022-10-26
Payer: COMMERCIAL

## 2022-10-26 DIAGNOSIS — R00.2 PALPITATIONS: ICD-10-CM

## 2022-10-26 PROCEDURE — 93660 TILT TABLE EVALUATION: CPT

## 2022-10-26 NOTE — PROCEDURES
[id number] [facility] Cardiology [facility]      Tilt Table Test Report      Date of Procedure: 10/26/2022    Date of Report: 10/26/2022      The patient was brought to the [address] [place] [address]. Heart rate and blood pressure were obtained at rest (horizontal) and then every minute during 45 minutes of heads up tilt table testing at 80 degrees. The patient was then recovered. Results: At rest the heart rate was:  78 bpm and the blood pressure was:  119/74 mmHg    During 45 minutes of upright tilt table testing, the heart rate varied between 83 bpm and 111 bpm.    During 45 minutes of upright tilt table testing, the blood pressure varied between 103/75 mmHg and 157/57 mmHg. At the completion of the test, the heart rate was:  73 bpm and the blood pressure was:  125/83 mmHg    During the 45 minutes of up right tilt table testing, the patient experienced dizziness and \"fuzzy\" vision. Complications:  None      Conclusions: The 45 minutes of heads up right tilt table testing showed:    1. Dizziness and \"fuzzy\" vision without syncope    2. No significant variations in blood pressure    3. No significant variations in heart rate    4. Normal sinus rhythm/ sinus tachycardia without ectopy, arrhythmia or pauses.     Impression: Negative tilt table test.       Electronically signed by LAUREN Bruno on 10/26/22    [address] [facility] Cardiology [facility]

## 2022-10-31 ENCOUNTER — OFFICE VISIT (OUTPATIENT)
Dept: CARDIOLOGY CLINIC | Age: 22
End: 2022-10-31
Payer: COMMERCIAL

## 2022-10-31 VITALS
DIASTOLIC BLOOD PRESSURE: 102 MMHG | HEIGHT: 61 IN | BODY MASS INDEX: 39.65 KG/M2 | HEART RATE: 99 BPM | SYSTOLIC BLOOD PRESSURE: 148 MMHG | WEIGHT: 210 LBS

## 2022-10-31 DIAGNOSIS — R42 ORTHOSTATIC DIZZINESS: Primary | ICD-10-CM

## 2022-10-31 PROCEDURE — 99213 OFFICE O/P EST LOW 20 MIN: CPT | Performed by: INTERNAL MEDICINE

## 2022-10-31 ASSESSMENT — ENCOUNTER SYMPTOMS
SHORTNESS OF BREATH: 0
EYE PAIN: 0
BLOOD IN STOOL: 0
SORE THROAT: 0
ABDOMINAL DISTENTION: 0
CHEST TIGHTNESS: 0
EYE DISCHARGE: 0
COUGH: 0
VOMITING: 0
APNEA: 0
EYE REDNESS: 0
NAUSEA: 0
ABDOMINAL PAIN: 0
CONSTIPATION: 0
DIARRHEA: 0
FACIAL SWELLING: 0
WHEEZING: 0

## 2022-10-31 NOTE — PROGRESS NOTES
Cardiology Office Visit Note  Pepe Anderson  [id number]  Phone: [phone]  Fax: [phone]                            Date:  10/31/2022  Patient: Maykel Thurman  Age:  23 y.o., 2000    Referral: No ref. provider found    REASON FOR VISIT:  3 Month Follow-Up and Palpitations         PROBLEM LIST:    Patient Active Problem List    Diagnosis Date Noted    Palpitations 05/13/2021     Overview Note:     Probable extrasystoles, consider PACs, PVCs are both           PRESENTATION: Maykel Thurman is a 23y.o. year old female is seen today for routine cardiology follow-up appointment. She was last seen in the office approximately 2 weeks ago. She has a history of chronic intermittent palpitations. Prior cardiac monitor notable for sinus rhythm at baseline with PVCs. Last echocardiogram largely unremarkable with no major structural heart disease. Unremarkable labs. The last visit she complained of intermittent episodes of lightheadedness associate with tachycardia, dizziness and headaches as well as atypical random onset chest pain. She also had 1 episode of syncope which she attributed to hypoglycemia. There was concern for possible postural orthostatic tachycardia syndrome. A tilt table was arranged. The study was negative for marked tachycardia and severe hypotension. Patient however did have some mild degree of orthostasis with resting systolic blood pressure 827 relative to the absolute low systolic of 374. Gurmeet Palacio She is notably on spironolactone which she takes for PCOS. REVIEW OF SYSTEMS:  Review of Systems   Constitutional:  Negative for chills, fatigue and fever. HENT:  Negative for congestion, facial swelling, hearing loss and sore throat. Eyes:  Negative for pain, discharge, redness and visual disturbance. Respiratory:  Negative for apnea, cough, chest tightness, shortness of breath and wheezing.     Cardiovascular:  Negative for chest pain, palpitations and leg swelling. Gastrointestinal:  Negative for abdominal distention, abdominal pain, blood in stool, constipation, diarrhea, nausea and vomiting. Endocrine: Negative for polydipsia, polyphagia and polyuria. Genitourinary:  Negative for dysuria, flank pain, frequency and hematuria. Musculoskeletal:  Negative for joint swelling, myalgias and neck pain. Skin:  Negative for pallor and rash. Neurological:  Negative for dizziness, syncope, speech difficulty, light-headedness, numbness and headaches. Psychiatric/Behavioral:  Negative for confusion, hallucinations and sleep disturbance. Past Medical History:      Diagnosis Date    Gastroparesis     Near syncope     Palpitation     PCOS (polycystic ovarian syndrome)     Seasonal allergies     Vertigo     related to severe allergy syndrome        Past Surgical History:      Procedure Laterality Date    SINUS ENDOSCOPY      2019       Medications:  Current Outpatient Medications   Medication Sig Dispense Refill    ferrous sulfate (IRON 325) 325 (65 Fe) MG tablet Take 325 mg by mouth daily (with breakfast)      escitalopram (LEXAPRO) 10 MG tablet Take 10 mg by mouth daily       ondansetron (ZOFRAN) 4 MG tablet Take 4 mg by mouth every 8 hours as needed       spironolactone (ALDACTONE) 25 MG tablet Take 25 mg by mouth daily       cetirizine (ZYRTEC) 10 MG tablet Take 10 mg by mouth as needed for Allergies      erythromycin base (E-MYCIN) 250 MG tablet Take 250 mg by mouth 3 times daily (Patient not taking: No sig reported)      omeprazole (PRILOSEC) 40 MG delayed release capsule Take 40 mg by mouth 2 times daily       No current facility-administered medications for this visit. Allergies:  Patient has no known allergies.     Social History:  Social History     Occupational History    Not on file   Tobacco Use    Smoking status: Never    Smokeless tobacco: Never   Substance and Sexual Activity    Alcohol use: No    Drug use: No    Sexual activity: Never Family History:       Problem Relation Age of Onset    Heart Disease Father     Atrial Fibrillation Father          Physical Examination:  BP (!) 148/102   Pulse 99   Ht 5' 1\" (1.549 m)   Wt 210 lb (95.3 kg)   BMI 39.68 kg/m²   Physical Exam  Vitals reviewed. Constitutional:       General: She is not in acute distress. Appearance: Normal appearance. She is not ill-appearing, toxic-appearing or diaphoretic. HENT:      Head: Normocephalic and atraumatic. Eyes:      General: No scleral icterus. Right eye: No discharge. Left eye: No discharge. Conjunctiva/sclera: Conjunctivae normal.   Neck:      Vascular: No carotid bruit. Cardiovascular:      Rate and Rhythm: Normal rate and regular rhythm. No extrasystoles are present. Chest Wall: PMI is not displaced. No thrill. Heart sounds: S1 normal and S2 normal. No murmur heard. No friction rub. No gallop. Pulmonary:      Effort: Pulmonary effort is normal. No tachypnea or respiratory distress. Breath sounds: Normal breath sounds. No stridor. No wheezing, rhonchi or rales. Chest:      Chest wall: No tenderness. Abdominal:      General: Bowel sounds are normal. There is no distension. Palpations: Abdomen is soft. There is no mass. Tenderness: There is no abdominal tenderness. There is no guarding. Musculoskeletal:         General: No swelling. Cervical back: Normal range of motion and neck supple. No rigidity. Right lower leg: No edema. Left lower leg: No edema. Skin:     General: Skin is warm and dry. Coloration: Skin is not jaundiced. Findings: No erythema or rash. Neurological:      General: No focal deficit present. Mental Status: She is alert and oriented to person, place, and time. Mental status is at baseline. Psychiatric:         Mood and Affect: Mood normal.         Behavior: Behavior normal.         Thought Content:  Thought content normal.         Labs: CBC: No results found for: CBC   BMP: No results found for: BMP    BNP: No results found for: BNPINT   PT/INR: No results found for: PROTIME, INR, INR    APTT:  No results found for: APTT   CARDIAC ENZYMES:   Troponin   Date Value Ref Range Status   10/12/2017 <0.01 0.00 - 0.03 ng/mL Final     Comment:     <0.030 ng/ml       No measurable cardiac damage. 0.030-0.099 ng/ml  Values of troponin in this range suggest  possible myocardial damage. Repeat assay  4 to 6 hours after the current specimen.    >= 0.100 ng/ml     Indicative of myocardial damage. Recommend comtinued monitoring of  patient status and cardiac markers. LIPID PANEL: No results found for: LIPIDPAN  LIVER PROFILE:   AST   Date Value Ref Range Status   10/12/2017 22 5 - 32 U/L Final     ALT   Date Value Ref Range Status   10/12/2017 13 5 - 33 U/L Final     Albumin   Date Value Ref Range Status   10/12/2017 4.7 3.5 - 5.2 g/dL Final                ASSESSMENT and PLAN:    Lightheadedness and dizziness, due to orthostasis  Negative tilt table test for POTS  Palpitation with PVCs on cardiac monitor  PCOS on spironolactone  Obesity, BMI 40    Encourage oral hydration with electrolyte rich drinks  Recommend compression stocking use in the daytime  Recommend considering decreasing dose of spironolactone, defer to patient's gynecologist who she has an appointment with next month. She will discuss at that time. Discussed the use of midodrine if needed. Will defer for now. Follow-up in 3 months            No follow-ups on file. Electronically signed by Nubia Quinn MD on 10/31/2022 at 729 Eliezer Carrillo MD, [place], [facility]  Noninvasive Cardiology Consultant    This dictation was generated by voice recognition computer software. Although all attempts are made to edit the dictation for accuracy, there may be errors in the transcription that are not intended.

## 2022-11-21 ENCOUNTER — OFFICE VISIT (OUTPATIENT)
Dept: FAMILY MEDICINE CLINIC | Facility: CLINIC | Age: 22
End: 2022-11-21

## 2022-11-21 DIAGNOSIS — H65.01 RIGHT ACUTE SEROUS OTITIS MEDIA, RECURRENCE NOT SPECIFIED: Primary | ICD-10-CM

## 2022-11-21 PROCEDURE — 99213 OFFICE O/P EST LOW 20 MIN: CPT | Performed by: FAMILY MEDICINE

## 2022-11-21 RX ORDER — METHYLPREDNISOLONE 4 MG/1
TABLET ORAL
Qty: 21 TABLET | Refills: 0 | Status: SHIPPED | OUTPATIENT
Start: 2022-11-21 | End: 2022-12-08

## 2022-11-21 RX ORDER — AZITHROMYCIN 250 MG/1
TABLET, FILM COATED ORAL
Qty: 6 TABLET | Refills: 0 | Status: SHIPPED | OUTPATIENT
Start: 2022-11-21 | End: 2022-12-08

## 2022-11-21 NOTE — PROGRESS NOTES
Subjective   Wendy Newby is a 22 y.o. female.     No chief complaint on file.      History of Present Illness     she nos 3 days of ear pain and dimisihed heariing in the right ear      Current Outpatient Medications:   •  azithromycin (Zithromax Z-Abdiel) 250 MG tablet, Take 2 tablets by mouth on day 1, then 1 tablet daily on days 2-5, Disp: 6 tablet, Rfl: 0  •  cetirizine (zyrTEC) 10 MG tablet, Take 10 mg by mouth Daily As Needed for allergies., Disp: , Rfl:   •  Cholecalciferol (Vitamin D3) 50 MCG (2000 UT) tablet, Take  by mouth., Disp: , Rfl:   •  EPINEPHrine (EPIPEN) 0.3 MG/0.3ML solution auto-injector injection, , Disp: , Rfl:   •  escitalopram (LEXAPRO) 10 MG tablet, TAKE ONE TABLET BY MOUTH DAILY, Disp: 30 tablet, Rfl: 5  •  Ferrous Sulfate (IRON PO), Take  by mouth., Disp: , Rfl:   •  methylPREDNISolone (MEDROL) 4 MG dose pack, Take as directed on package instructions., Disp: 21 tablet, Rfl: 0  •  metoclopramide (REGLAN) 10 MG tablet, Take 10 mg by mouth., Disp: , Rfl:   •  omeprazole (priLOSEC) 40 MG capsule, Take 40 mg by mouth 2 (two) times a day., Disp: , Rfl:   •  ondansetron (ZOFRAN) 4 MG tablet, Take 4 mg by mouth., Disp: , Rfl:   •  spironolactone (ALDACTONE) 25 MG tablet, TAKE ONE TABLET BY MOUTH TWICE A DAY, Disp: 60 tablet, Rfl: 4  •  vitamin B-12 (CYANOCOBALAMIN) 500 MCG tablet, Take  by mouth Daily., Disp: , Rfl:   •  Zinc Sulfate (ZINC 15 PO), Take  by mouth., Disp: , Rfl:   No Known Allergies    Class 2 Severe Obesity (BMI >=35 and <=39.9). Obesity-related health conditions include the following: none. Obesity is unchanged. BMI is is above average; BMI management plan is completed. We discussed portion control and increasing exercise.      Past Medical History:   Diagnosis Date   • Allergic     seasonal allergies   • PCOS (polycystic ovarian syndrome)    • PCOS (polycystic ovarian syndrome)    • Vertigo     r/t to severe allergy symptoms     Past Surgical History:   Procedure Laterality Date    • ENDOSCOPY N/A 11/14/2019    Normal exam   • NO PAST SURGERIES         Review of Systems   Constitutional: Negative.    HENT: Positive for congestion and hearing loss.    Eyes: Negative.    Respiratory: Negative.    Cardiovascular: Negative.    Gastrointestinal: Negative.    Endocrine: Negative.    Genitourinary: Negative.    Musculoskeletal: Negative.    Skin: Negative.    Allergic/Immunologic: Negative.    Neurological: Negative.    Hematological: Negative.    Psychiatric/Behavioral: Negative.        Objective .There were no vitals taken for this visit.  Physical Exam  Vitals and nursing note reviewed.   Constitutional:       Appearance: Normal appearance. She is normal weight.   HENT:      Head: Normocephalic and atraumatic.      Nose: Nose normal.      Mouth/Throat:      Mouth: Mucous membranes are moist.   Eyes:      Extraocular Movements: Extraocular movements intact.      Conjunctiva/sclera: Conjunctivae normal.      Pupils: Pupils are equal, round, and reactive to light.   Cardiovascular:      Rate and Rhythm: Normal rate and regular rhythm.      Pulses: Normal pulses.      Heart sounds: Normal heart sounds.   Pulmonary:      Effort: Pulmonary effort is normal.      Breath sounds: Normal breath sounds.   Abdominal:      General: Abdomen is flat. Bowel sounds are normal.      Palpations: Abdomen is soft.   Musculoskeletal:         General: Normal range of motion.      Cervical back: Normal range of motion and neck supple.   Skin:     General: Skin is warm and dry.      Capillary Refill: Capillary refill takes less than 2 seconds.   Neurological:      General: No focal deficit present.      Mental Status: She is alert.   Psychiatric:         Mood and Affect: Mood normal.         Assessment & Plan   Diagnoses and all orders for this visit:    1. Right acute serous otitis media, recurrence not specified (Primary)    Other orders  -     azithromycin (Zithromax Z-Abdiel) 250 MG tablet; Take 2 tablets by mouth on  day 1, then 1 tablet daily on days 2-5  Dispense: 6 tablet; Refill: 0  -     methylPREDNISolone (MEDROL) 4 MG dose pack; Take as directed on package instructions.  Dispense: 21 tablet; Refill: 0      Let me know if not improvong by next week           No orders of the defined types were placed in this encounter.      Follow up: prn

## 2022-11-23 ENCOUNTER — TELEPHONE (OUTPATIENT)
Dept: FAMILY MEDICINE CLINIC | Facility: CLINIC | Age: 22
End: 2022-11-23

## 2022-11-23 NOTE — TELEPHONE ENCOUNTER
Caller: Wendy Newby    Relationship: Self    Best call back number: [phone]  FOR TODAY      What medications are you currently taking:   Current Outpatient Medications on File Prior to Visit   Medication Sig Dispense Refill   • azithromycin (Zithromax Z-Abdiel) 250 MG tablet Take 2 tablets by mouth on day 1, then 1 tablet daily on days 2-5 6 tablet 0   • cetirizine (zyrTEC) 10 MG tablet Take 10 mg by mouth Daily As Needed for allergies.     • Cholecalciferol (Vitamin D3) 50 MCG (2000 UT) tablet Take  by mouth.     • EPINEPHrine (EPIPEN) 0.3 MG/0.3ML solution auto-injector injection      • escitalopram (LEXAPRO) 10 MG tablet TAKE ONE TABLET BY MOUTH DAILY 30 tablet 5   • Ferrous Sulfate (IRON PO) Take  by mouth.     • methylPREDNISolone (MEDROL) 4 MG dose pack Take as directed on package instructions. 21 tablet 0   • metoclopramide (REGLAN) 10 MG tablet Take 10 mg by mouth.     • omeprazole (priLOSEC) 40 MG capsule Take 40 mg by mouth 2 (two) times a day.     • ondansetron (ZOFRAN) 4 MG tablet Take 4 mg by mouth.     • spironolactone (ALDACTONE) 25 MG tablet TAKE ONE TABLET BY MOUTH TWICE A DAY 60 tablet 4   • vitamin B-12 (CYANOCOBALAMIN) 500 MCG tablet Take  by mouth Daily.     • Zinc Sulfate (ZINC 15 PO) Take  by mouth.       No current facility-administered medications on file prior to visit.          When did you start taking these medications:     YESTERDAY    Which medication are you concerned about:     STEROID - METYOPREDNISOLONE    Who prescribed you this medication:     DR. MARIE    What are your concerns:     NOT TOLERATING WELL. NAUSEA, DIAREA, TACHYCARDAI, DIZZINESS    How long have you had these concerns:     YESTERDAY ONCE PATIENT STARTED TAKING IT.

## 2022-12-08 ENCOUNTER — OFFICE VISIT (OUTPATIENT)
Dept: OBSTETRICS AND GYNECOLOGY | Facility: CLINIC | Age: 22
End: 2022-12-08
Payer: COMMERCIAL

## 2022-12-08 VITALS
DIASTOLIC BLOOD PRESSURE: 92 MMHG | SYSTOLIC BLOOD PRESSURE: 118 MMHG | BODY MASS INDEX: 38.45 KG/M2 | WEIGHT: 217 LBS | HEIGHT: 63 IN

## 2022-12-08 DIAGNOSIS — Z80.3 FAMILY HISTORY OF BREAST CANCER: ICD-10-CM

## 2022-12-08 DIAGNOSIS — E28.2 PCOS (POLYCYSTIC OVARIAN SYNDROME): Primary | ICD-10-CM

## 2022-12-08 PROCEDURE — 99213 OFFICE O/P EST LOW 20 MIN: CPT | Performed by: NURSE PRACTITIONER

## 2022-12-08 RX ORDER — METOPROLOL SUCCINATE 25 MG/1
25 TABLET, EXTENDED RELEASE ORAL DAILY
COMMUNITY
Start: 2022-12-06 | End: 2023-12-07

## 2022-12-09 LAB
INSULIN SERPL-ACNC: 26.3 UIU/ML (ref 2.6–24.9)
TESTOST SERPL-MCNC: 39 NG/DL (ref 13–71)

## 2022-12-14 RX ORDER — ESCITALOPRAM OXALATE 10 MG/1
TABLET ORAL
Qty: 30 TABLET | Refills: 5 | Status: SHIPPED | OUTPATIENT
Start: 2022-12-14

## 2022-12-28 DIAGNOSIS — Z80.3 FAMILY HISTORY OF BREAST CANCER: Primary | ICD-10-CM

## 2023-01-05 NOTE — PROGRESS NOTES
Subjective   Wendy Newby is a 22 y.o. female  YOB: 2000      Chief Complaint   Patient presents with   • Follow-up     Pt here to f/u on lab results, pt reports spotting in august and September but hasn't had a full period       HPI    The following portions of the patient's history were reviewed and updated as appropriate: allergies, current medications, past family history, past medical history, past social history, past surgical history, and problem list.    No Known Allergies    Past Medical History:   Diagnosis Date   • Allergic     seasonal allergies   • Gastroparesis    • PCOS (polycystic ovarian syndrome)    • PCOS (polycystic ovarian syndrome)    • Vertigo     r/t to severe allergy symptoms       Family History   Problem Relation Age of Onset   • No Known Problems Father    • No Known Problems Mother    • No Known Problems Sister    • Diabetes Paternal Grandmother    • Breast cancer Maternal Grandmother    • Breast cancer Maternal Great-Grandmother    • Ovarian cancer Neg Hx    • Colon cancer Neg Hx    • Colon polyps Neg Hx        Social History     Socioeconomic History   • Marital status: Single   Tobacco Use   • Smoking status: Never   • Smokeless tobacco: Never   Vaping Use   • Vaping Use: Never used   Substance and Sexual Activity   • Alcohol use: No   • Drug use: No   • Sexual activity: Never     Birth control/protection: OCP         Current Outpatient Medications:   •  cetirizine (zyrTEC) 10 MG tablet, Take 10 mg by mouth Daily As Needed for allergies., Disp: , Rfl:   •  Cholecalciferol (Vitamin D3) 50 MCG (2000 UT) tablet, Take  by mouth., Disp: , Rfl:   •  EPINEPHrine (EPIPEN) 0.3 MG/0.3ML solution auto-injector injection, , Disp: , Rfl:   •  Ferrous Sulfate (IRON PO), Take  by mouth., Disp: , Rfl:   •  metoclopramide (REGLAN) 10 MG tablet, Take 10 mg by mouth., Disp: , Rfl:   •  metoprolol succinate XL (TOPROL-XL) 25 MG 24 hr tablet, Take 25 mg by mouth Daily., Disp: , Rfl:   •   omeprazole (priLOSEC) 40 MG capsule, Take 40 mg by mouth 2 (two) times a day., Disp: , Rfl:   •  ondansetron (ZOFRAN) 4 MG tablet, Take 4 mg by mouth., Disp: , Rfl:   •  spironolactone (ALDACTONE) 25 MG tablet, TAKE ONE TABLET BY MOUTH TWICE A DAY, Disp: 60 tablet, Rfl: 4  •  escitalopram (LEXAPRO) 10 MG tablet, TAKE ONE TABLET BY MOUTH DAILY, Disp: 30 tablet, Rfl: 5    Patient's last menstrual period was 09/19/2022.    Sexual History:         Could not be calculated    Past Surgical History:   Procedure Laterality Date   • ENDOSCOPY N/A 11/14/2019    Normal exam   • NO PAST SURGERIES         Review of Systems    Objective   Physical Exam      Vitals:    12/08/22 1131   BP: 118/92   BP Location: Right arm   Patient Position: Sitting   Cuff Size: Large Adult   Weight: 98.4 kg (217 lb)   Height: 158.8 cm (62.5\")       Diagnoses and all orders for this visit:    1. PCOS (polycystic ovarian syndrome) (Primary)  Comments:  Patient here for PCOS follow-up.  To do PCOS labs [id number] pending results.  Orders:  -     Testosterone  -     Insulin, Total    2. Family history of breast cancer  Comments:  Family history of breast cancer.  Referral for genetic testing/counseling made.  Orders:  -     Ambulatory Referral to [facility]          Class 2 Severe Obesity (BMI >=35 and <=39.9). Obesity-related health conditions include the following: hypertension and GERD. Obesity is unchanged. BMI is is above average; BMI management plan is completed. We discussed portion control and increasing exercise.             Non-Smoker    MyChart Instructions Given

## 2023-01-15 DIAGNOSIS — E28.2 PCOS (POLYCYSTIC OVARIAN SYNDROME): Primary | ICD-10-CM

## 2023-01-19 DIAGNOSIS — R79.89 ELEVATED TESTOSTERONE LEVEL: ICD-10-CM

## 2023-01-19 RX ORDER — SPIRONOLACTONE 25 MG/1
TABLET ORAL
Qty: 60 TABLET | Refills: 1 | Status: SHIPPED | OUTPATIENT
Start: 2023-01-19 | End: 2023-04-03

## 2023-01-30 ENCOUNTER — TELEPHONE (OUTPATIENT)
Dept: CARDIOLOGY CLINIC | Age: 23
End: 2023-01-30

## 2023-02-02 ENCOUNTER — TELEPHONE (OUTPATIENT)
Dept: FAMILY MEDICINE CLINIC | Facility: CLINIC | Age: 23
End: 2023-02-02
Payer: COMMERCIAL

## 2023-02-02 NOTE — TELEPHONE ENCOUNTER
Patient called stating that she is having a gastroparesis flare up and she has a plan of care with gastro but will take some time. She is wanting to know if you can order outpatient fluid for her in the mean time.

## 2023-02-06 DIAGNOSIS — R13.10 DYSPHAGIA, UNSPECIFIED TYPE: Primary | ICD-10-CM

## 2023-02-15 RX ORDER — METOCLOPRAMIDE 10 MG/1
10 TABLET ORAL 4 TIMES DAILY
COMMUNITY

## 2023-02-15 RX ORDER — ACETAMINOPHEN 160 MG
TABLET,DISINTEGRATING ORAL DAILY
COMMUNITY

## 2023-02-15 RX ORDER — CHOLECALCIFEROL (VITAMIN D3) 125 MCG
500 CAPSULE ORAL DAILY
COMMUNITY

## 2023-02-16 ENCOUNTER — OFFICE VISIT (OUTPATIENT)
Dept: GASTROENTEROLOGY | Age: 23
End: 2023-02-16
Payer: COMMERCIAL

## 2023-02-16 VITALS
DIASTOLIC BLOOD PRESSURE: 83 MMHG | SYSTOLIC BLOOD PRESSURE: 139 MMHG | OXYGEN SATURATION: 98 % | HEART RATE: 88 BPM | HEIGHT: 61 IN | BODY MASS INDEX: 40.22 KG/M2 | WEIGHT: 213 LBS

## 2023-02-16 DIAGNOSIS — K31.84 GASTROPARESIS: Primary | ICD-10-CM

## 2023-02-16 DIAGNOSIS — R14.2 BELCHING: ICD-10-CM

## 2023-02-16 DIAGNOSIS — R10.13 EPIGASTRIC PAIN: ICD-10-CM

## 2023-02-16 PROCEDURE — 99204 OFFICE O/P NEW MOD 45 MIN: CPT | Performed by: NURSE PRACTITIONER

## 2023-02-16 RX ORDER — METOPROLOL SUCCINATE 25 MG/1
25 TABLET, EXTENDED RELEASE ORAL DAILY
COMMUNITY
Start: 2023-02-01

## 2023-02-16 RX ORDER — ONDANSETRON 4 MG/1
4 TABLET, FILM COATED ORAL EVERY 6 HOURS PRN
Qty: 20 TABLET | Refills: 3 | Status: SHIPPED | OUTPATIENT
Start: 2023-02-16

## 2023-02-16 RX ORDER — BETHANECHOL CHLORIDE 25 MG/1
25 TABLET ORAL
Qty: 120 TABLET | Refills: 5 | Status: SHIPPED | OUTPATIENT
Start: 2023-02-16

## 2023-02-16 RX ORDER — SUCRALFATE 1 G/1
1 TABLET ORAL 4 TIMES DAILY
Qty: 120 TABLET | Refills: 3 | Status: SHIPPED | OUTPATIENT
Start: 2023-02-16

## 2023-02-16 ASSESSMENT — ENCOUNTER SYMPTOMS
ABDOMINAL PAIN: 1
ABDOMINAL DISTENTION: 1
NAUSEA: 1
CONSTIPATION: 0
SHORTNESS OF BREATH: 0
BLOOD IN STOOL: 0
COUGH: 0
VOMITING: 0
ANAL BLEEDING: 0
DIARRHEA: 1
TROUBLE SWALLOWING: 0
RECTAL PAIN: 0
CHOKING: 0

## 2023-02-16 NOTE — PROGRESS NOTES
Subjective:     Patient ID: Renetta Pacheco is a 23 y.o. female  PCP: Wendi Elizondo MD  Referring Provider: Ramon Silva MD    HPI  Patient presents to the office today with the following complaints: New Patient and Dysphagia      Pt reports hx gastroparesis. \"I can't eat or drink very much right now. \"  Last flare began about 1 month ago. She c/o stomach pain, burning, cramping. She c/o bloating, belching. Denies any vomiting. She will have nausea and diarrhea. Last GES showed moderate delayed emptying. She is using Reglan BID for the last couple of years. She will also use Zofran prn. She is also taking Omeprazole 40 mg BID. Pain located in epigastric area and LUQ pain. This is constant. Gastroparesis is mostly controlled except during \"flares. \"  Zenobia Puller will last about a month, at least yearly. She feels symptoms have worsened. Last EGD 2021 - At [facility]     Assessment:     1. Gastroparesis    2. Epigastric pain    3. Belching            Plan:   - Trial of Bethanechol 25 mg QID  - I would like to try and wean pt off Reglan due to potential long term side effects, pt agreeable to plan   - Ok for Zofran prn  - Trial of Carafate for epigastric burning  - Follow up in 6-8 weeks or sooner  - Schedule EGD  Nothing to eat or drink after midnight. No driving for 24 hours after procedure. Bring a  to procedure. No aspirin, NSAIDs, fish oil 5 days before procedure. I have discussed the benefits, alternatives, and risks (including bleeding, perforation and death)  for pursuing Endoscopy (EGD/Colonscopy/EUS/ERCP) with the patient and they are willing to continue. We also discussed the need for anesthesia, IV access, proper dietary changes, medication changes if necessary, and need for bowel prep (if ordered) prior to their Endoscopic procedure.   They are aware they must have someone accompany them to their scheduled procedure to drive them home - they agree to the above and are willing to continue. Orders  No orders of the defined types were placed in this encounter.     Medications  Orders Placed This Encounter   Medications    bethanechol (URECHOLINE) 25 MG tablet     Sig: Take 1 tablet by mouth 4 times daily (before meals and nightly)     Dispense:  120 tablet     Refill:  5    sucralfate (CARAFATE) 1 GM tablet     Sig: Take 1 tablet by mouth 4 times daily     Dispense:  120 tablet     Refill:  3    ondansetron (ZOFRAN) 4 MG tablet     Sig: Take 1 tablet by mouth every 6 hours as needed for Nausea     Dispense:  20 tablet     Refill:  3         Patient History:     Past Medical History:   Diagnosis Date    Gastroparesis     Near syncope     Palpitation     PCOS (polycystic ovarian syndrome)     Seasonal allergies     Vertigo     related to severe allergy syndrome        Past Surgical History:   Procedure Laterality Date    SINUS ENDOSCOPY      2019    UPPER GASTROINTESTINAL ENDOSCOPY  01/08/2021    Sigifredo Neville MD    UPPER GASTROINTESTINAL ENDOSCOPY  11/14/2019    Justin Wolf MD       Family History   Problem Relation Age of Onset    Heart Disease Father     Atrial Fibrillation Father     Colon Cancer Neg Hx     Colon Polyps Neg Hx        Social History     Socioeconomic History    Marital status: Single   Tobacco Use    Smoking status: Never    Smokeless tobacco: Never   Vaping Use    Vaping Use: Never used   Substance and Sexual Activity    Alcohol use: No    Drug use: No    Sexual activity: Never       Current Outpatient Medications   Medication Sig Dispense Refill    metoprolol succinate (TOPROL XL) 25 MG extended release tablet Take 25 mg by mouth daily      bethanechol (URECHOLINE) 25 MG tablet Take 1 tablet by mouth 4 times daily (before meals and nightly) 120 tablet 5    sucralfate (CARAFATE) 1 GM tablet Take 1 tablet by mouth 4 times daily 120 tablet 3    ondansetron (ZOFRAN) 4 MG tablet Take 1 tablet by mouth every 6 hours as needed for Nausea 20 tablet 3    Cholecalciferol (VITAMIN D3) 50 MCG (2000 UT) CAPS Take by mouth daily      EPINEPHrine (SYMJEPI) 0.3 MG/0.3ML SOSY injection Inject 0.3 mg into the muscle as needed for Anaphylaxis      metoclopramide (REGLAN) 10 MG tablet Take 10 mg by mouth 4 times daily      vitamin B-12 (CYANOCOBALAMIN) 500 MCG tablet Take 500 mcg by mouth daily      Zinc Sulfate (ZINC 15 PO) Take by mouth daily      ferrous sulfate (IRON 325) 325 (65 Fe) MG tablet Take 325 mg by mouth daily (with breakfast)      erythromycin base (E-MYCIN) 250 MG tablet Take 250 mg by mouth 3 times daily      escitalopram (LEXAPRO) 10 MG tablet Take 10 mg by mouth daily       omeprazole (PRILOSEC) 40 MG delayed release capsule Take 40 mg by mouth 2 times daily      spironolactone (ALDACTONE) 25 MG tablet Take 25 mg by mouth daily       cetirizine (ZYRTEC) 10 MG tablet Take 10 mg by mouth as needed for Allergies       No current facility-administered medications for this visit. No Known Allergies    Review of Systems   Constitutional:  Negative for activity change, appetite change, fatigue, fever and unexpected weight change. HENT:  Negative for trouble swallowing. Respiratory:  Negative for cough, choking and shortness of breath. Cardiovascular:  Negative for chest pain. Gastrointestinal:  Positive for abdominal distention (bloating), abdominal pain, diarrhea and nausea. Negative for anal bleeding, blood in stool, constipation, rectal pain and vomiting. Allergic/Immunologic: Negative for food allergies. All other systems reviewed and are negative. Objective:     /83 (Site: Left Upper Arm)   Pulse 88   Ht 5' 1\" (1.549 m)   Wt 213 lb (96.6 kg)   SpO2 98%   BMI 40.25 kg/m²     Physical Exam  Vitals reviewed. Constitutional:       General: She is not in acute distress. Appearance: She is well-developed. HENT:      Head: Normocephalic and atraumatic.       Right Ear: External ear normal.      Left Ear: External ear normal.      Nose: Nose normal.   Eyes:      Conjunctiva/sclera: Conjunctivae normal.      Pupils: Pupils are equal, round, and reactive to light. Cardiovascular:      Rate and Rhythm: Normal rate and regular rhythm. Heart sounds: Normal heart sounds. No murmur heard. No friction rub. No gallop. Pulmonary:      Effort: Pulmonary effort is normal. No respiratory distress. Breath sounds: Normal breath sounds. Abdominal:      General: Bowel sounds are normal. There is no distension. Palpations: Abdomen is soft. There is no mass. Tenderness: There is abdominal tenderness in the epigastric area. There is no guarding or rebound. Musculoskeletal:         General: Normal range of motion. Cervical back: Normal range of motion and neck supple. Skin:     General: Skin is warm and dry. Findings: No rash. Nails: There is no clubbing. Neurological:      Mental Status: She is alert and oriented to person, place, and time. Gait: Gait normal.   Psychiatric:         Behavior: Behavior normal.         Thought Content:  Thought content normal.

## 2023-02-22 ENCOUNTER — TELEPHONE (OUTPATIENT)
Dept: GASTROENTEROLOGY | Age: 23
End: 2023-02-22

## 2023-02-22 NOTE — TELEPHONE ENCOUNTER
Called patient to remind them of [place] [facility] [place] arrive at [address] / [place]

## 2023-02-28 ENCOUNTER — ANESTHESIA (OUTPATIENT)
Dept: ENDOSCOPY | Age: 23
End: 2023-02-28
Payer: COMMERCIAL

## 2023-02-28 ENCOUNTER — ANESTHESIA EVENT (OUTPATIENT)
Dept: ENDOSCOPY | Age: 23
End: 2023-02-28
Payer: COMMERCIAL

## 2023-02-28 ENCOUNTER — HOSPITAL ENCOUNTER (OUTPATIENT)
Age: 23
Setting detail: OUTPATIENT SURGERY
Discharge: HOME | End: 2023-02-28
Attending: INTERNAL MEDICINE | Admitting: INTERNAL MEDICINE
Payer: COMMERCIAL

## 2023-02-28 VITALS
HEIGHT: 61 IN | TEMPERATURE: 97.6 F | HEART RATE: 70 BPM | SYSTOLIC BLOOD PRESSURE: 107 MMHG | RESPIRATION RATE: 24 BRPM | OXYGEN SATURATION: 100 % | BODY MASS INDEX: 39.65 KG/M2 | DIASTOLIC BLOOD PRESSURE: 98 MMHG | WEIGHT: 210 LBS

## 2023-02-28 DIAGNOSIS — K31.84 GASTROPARESIS: ICD-10-CM

## 2023-02-28 DIAGNOSIS — R10.13 EPIGASTRIC BURNING SENSATION: ICD-10-CM

## 2023-02-28 LAB — HCG(URINE) PREGNANCY TEST: NEGATIVE

## 2023-02-28 PROCEDURE — 6360000002 HC RX W HCPCS

## 2023-02-28 PROCEDURE — 2580000003 HC RX 258: Performed by: INTERNAL MEDICINE

## 2023-02-28 PROCEDURE — 3609012400 HC EGD TRANSORAL BIOPSY SINGLE/MULTIPLE: Performed by: INTERNAL MEDICINE

## 2023-02-28 PROCEDURE — 7100000010 HC PHASE II RECOVERY - FIRST 15 MIN: Performed by: INTERNAL MEDICINE

## 2023-02-28 PROCEDURE — 84703 CHORIONIC GONADOTROPIN ASSAY: CPT

## 2023-02-28 PROCEDURE — 43239 EGD BIOPSY SINGLE/MULTIPLE: CPT | Performed by: INTERNAL MEDICINE

## 2023-02-28 PROCEDURE — 3700000000 HC ANESTHESIA ATTENDED CARE: Performed by: INTERNAL MEDICINE

## 2023-02-28 PROCEDURE — 88305 TISSUE EXAM BY PATHOLOGIST: CPT

## 2023-02-28 PROCEDURE — 2500000003 HC RX 250 WO HCPCS

## 2023-02-28 PROCEDURE — 2709999900 HC NON-CHARGEABLE SUPPLY: Performed by: INTERNAL MEDICINE

## 2023-02-28 PROCEDURE — 88342 IMHCHEM/IMCYTCHM 1ST ANTB: CPT

## 2023-02-28 PROCEDURE — 3700000001 HC ADD 15 MINUTES (ANESTHESIA): Performed by: INTERNAL MEDICINE

## 2023-02-28 PROCEDURE — 7100000011 HC PHASE II RECOVERY - ADDTL 15 MIN: Performed by: INTERNAL MEDICINE

## 2023-02-28 RX ORDER — SODIUM CHLORIDE, SODIUM LACTATE, POTASSIUM CHLORIDE, CALCIUM CHLORIDE 600; 310; 30; 20 MG/100ML; MG/100ML; MG/100ML; MG/100ML
INJECTION, SOLUTION INTRAVENOUS CONTINUOUS
Status: DISCONTINUED | OUTPATIENT
Start: 2023-02-28 | End: 2023-02-28 | Stop reason: HOSPADM

## 2023-02-28 RX ORDER — PROPOFOL 10 MG/ML
INJECTION, EMULSION INTRAVENOUS PRN
Status: DISCONTINUED | OUTPATIENT
Start: 2023-02-28 | End: 2023-02-28 | Stop reason: SDUPTHER

## 2023-02-28 RX ORDER — LIDOCAINE HYDROCHLORIDE 10 MG/ML
INJECTION, SOLUTION EPIDURAL; INFILTRATION; INTRACAUDAL; PERINEURAL PRN
Status: DISCONTINUED | OUTPATIENT
Start: 2023-02-28 | End: 2023-02-28 | Stop reason: SDUPTHER

## 2023-02-28 RX ADMIN — PROPOFOL 80 MG: 10 INJECTION, EMULSION INTRAVENOUS at 09:09

## 2023-02-28 RX ADMIN — PROPOFOL 50 MG: 10 INJECTION, EMULSION INTRAVENOUS at 09:11

## 2023-02-28 RX ADMIN — LIDOCAINE HYDROCHLORIDE 5 ML: 10 INJECTION, SOLUTION EPIDURAL; INFILTRATION; INTRACAUDAL; PERINEURAL at 09:09

## 2023-02-28 RX ADMIN — PROPOFOL 20 MG: 10 INJECTION, EMULSION INTRAVENOUS at 09:12

## 2023-02-28 RX ADMIN — PROPOFOL 50 MG: 10 INJECTION, EMULSION INTRAVENOUS at 09:10

## 2023-02-28 RX ADMIN — SODIUM CHLORIDE, POTASSIUM CHLORIDE, SODIUM LACTATE AND CALCIUM CHLORIDE: 600; 310; 30; 20 INJECTION, SOLUTION INTRAVENOUS at 08:46

## 2023-02-28 RX ADMIN — PROPOFOL 50 MG: 10 INJECTION, EMULSION INTRAVENOUS at 09:15

## 2023-02-28 RX ADMIN — PROPOFOL 50 MG: 10 INJECTION, EMULSION INTRAVENOUS at 09:14

## 2023-02-28 ASSESSMENT — PAIN DESCRIPTION - DESCRIPTORS: DESCRIPTORS: ACHING;SHARP

## 2023-02-28 ASSESSMENT — PAIN - FUNCTIONAL ASSESSMENT: PAIN_FUNCTIONAL_ASSESSMENT: 0-10

## 2023-02-28 NOTE — ANESTHESIA POSTPROCEDURE EVALUATION
Department of Anesthesiology  Postprocedure Note    Patient: Singh Torres  MRN: [id number]  YOB: 2000  Date of evaluation: 2/28/2023      Procedure Summary     Date: 02/28/23 Room / Location: [address]    Anesthesia Start: 0901 Anesthesia Stop: 2972    Procedure: EGD BIOPSY Diagnosis:       Epigastric burning sensation      Gastroparesis      (Epigastric burning sensation [R10.13])      (Gastroparesis [K31.84])    Surgeons: Jason Gutierrez MD Responsible Provider: LAUREN Sauer CRNA    Anesthesia Type: general, TIVA ASA Status: 2          Anesthesia Type: No value filed.     Rey Phase I:      Rey Phase II:        Anesthesia Post Evaluation    Patient location during evaluation: bedside  Patient participation: waiting for patient participation  Level of consciousness: sleepy but conscious  Airway patency: patent  Nausea & Vomiting: no nausea and no vomiting  Complications: no  Cardiovascular status: blood pressure returned to baseline  Respiratory status: acceptable  Hydration status: euvolemic  Comments: /87   Pulse 75   Temp 97.3 °F (36.3 °C) (Temporal)   Resp 16   Ht 5' 1\" (1.549 m)   Wt 210 lb (95.3 kg)   SpO2 98%   BMI 39.68 kg/m²

## 2023-02-28 NOTE — H&P
Patient Name: Yasir Restrepo  : 2000  MRN: [id number]  DATE: 23    Allergies: No Known Allergies     ENDOSCOPY  History and Physical    Procedure:    [] Diagnostic Colonoscopy       [] Screening Colonoscopy  [x] EGD      [] ERCP      [] EUS       [] Other    [x] Previous office notes/History and Physical reviewed from the patients chart. Please see EMR for further details of HPI. I have examined the patient's status immediately prior to the procedure and:      Indications/HPI:    []Abdominal Pain   []Barretts  []Screening/Surveillance   []History of Polyps  []Dysphagia            [] +Cologard/DNA testing  []Abnormal Imaging              []EOE Hx              [] Family Hx of CRC/Polyps  []Anemia                            []Food Impaction       []Recent Poor Prep  []GI Bleed             []Lymphadenopathy  []History of Polyps  []Change in bowel habits []Heartburn/Reflux  []Cancer- GI/Lung  []Chest Pain - Non Cardiac []Heme (+) Stool []Ulcers  []Constipation  []Hemoptysis  []Incontinence    []Diarrhea  []Hypoxemia  []Rectal Bleed (BRBPR)  [x]Nausea/Vomiting   [] Varices  []Crohns/Colitis  []Pancreatic Cyst   [] Cirrhosis   []Pancreatitis    []Abnormal MRCP  []Elevated LFT [] Stent Removal, Previous ERCP  []Other:     Anesthesia:   [x] MAC [] Moderate Sedation   [] General   [] None     ROS: 12 pt Review of Symptoms was negative unless mentioned above    Medications:   Prior to Admission medications    Medication Sig Start Date End Date Taking?  Authorizing Provider   metoprolol succinate (TOPROL XL) 25 MG extended release tablet Take 25 mg by mouth daily 23   Historical Provider, MD morelol (URECHOLINE) 25 MG tablet Take 1 tablet by mouth 4 times daily (before meals and nightly) 23   LAUREN Romano NP   sucralfate (CARAFATE) 1 GM tablet Take 1 tablet by mouth 4 times daily 23   LAUREN Romano NP   ondansetron (ZOFRAN) 4 MG tablet Take 1 tablet by mouth every 6 hours as needed for Nausea 2/16/23   LAUREN Prabhakar - NP   Cholecalciferol (VITAMIN D3) 50 MCG (2000 UT) CAPS Take by mouth daily    Historical Provider, MD   EPINEPHrine (SYMJEPI) 0.3 MG/0.3ML SOSY injection Inject 0.3 mg into the muscle as needed for Anaphylaxis    Historical Provider, MD   metoclopramide (REGLAN) 10 MG tablet Take 10 mg by mouth 4 times daily    Historical Provider, MD   vitamin B-12 (CYANOCOBALAMIN) 500 MCG tablet Take 500 mcg by mouth daily    Historical Provider, MD   Zinc Sulfate (ZINC 15 PO) Take by mouth daily    Historical Provider, MD   ferrous sulfate (IRON 325) 325 (65 Fe) MG tablet Take 325 mg by mouth daily (with breakfast)    Historical Provider, MD   erythromycin base (E-MYCIN) 250 MG tablet Take 250 mg by mouth 3 times daily    Historical Provider, MD   escitalopram (LEXAPRO) 10 MG tablet Take 10 mg by mouth daily  9/12/20   Historical Provider, MD   omeprazole (PRILOSEC) 40 MG delayed release capsule Take 40 mg by mouth 2 times daily 12/7/20 2/16/23  Historical Provider, MD   spironolactone (ALDACTONE) 25 MG tablet Take 25 mg by mouth daily  9/12/20   Historical Provider, MD   cetirizine (ZYRTEC) 10 MG tablet Take 10 mg by mouth as needed for Allergies    Historical Provider, MD       Past Medical History:  Past Medical History:   Diagnosis Date    Gastroparesis     Near syncope     Palpitation     PCOS (polycystic ovarian syndrome)     Seasonal allergies     Vertigo     related to severe allergy syndrome        Past Surgical History:  Past Surgical History:   Procedure Laterality Date    SINUS ENDOSCOPY      2019    UPPER GASTROINTESTINAL ENDOSCOPY  01/08/2021    Sagar Lindsay MD    UPPER GASTROINTESTINAL ENDOSCOPY  11/14/2019    Artemio Wolf MD       Social History:  Social History     Tobacco Use    Smoking status: Never    Smokeless tobacco: Never   Vaping Use    Vaping Use: Never used   Substance Use Topics    Alcohol use: No    Drug use: No       Vital Signs:   There were no vitals filed for this visit. Physical Exam:  Cardiac:  [x]WNL  []Comments:  Pulmonary:  [x]WNL   []Comments:  Neuro/Mental Status:  [x]WNL  []Comments:  Abdominal:  [x]WNL    []Comments:  Other:   []WNL  []Comments:    Informed Consent:  The risks and benefits of the procedure have been discussed with either the patient or if they cannot consent, their representative. Assessment:  Patient examined and appropriate for planned sedation and procedure. Plan:  Proceed with planned sedation and procedure as above.          Montserrat Hartley MD

## 2023-02-28 NOTE — OP NOTE
Endoscopic Procedure Note    Patient: Amauri Mason : 2000  Med Rec#: [id number] Acc#: [id number]     Primary Care Provider Kathy Cervantes MD  Referring Provider:     Endoscopist: Олег Porter MD    Date of Procedure:  2023    Procedure:   1. EGD with biopsy    Indications:   1. nausea  2. Hx of gastroparesis     Anesthesia:  Sedation was administered by anesthesia who monitored the patient during the procedure. Estimated Blood Loss: minimal    Procedure:   After reviewing the patient's chart and obtaining informed consent, the patient was placed in the left lateral decubitus position. A forward-viewing Olympus endoscope was lubricated and inserted through the mouth into the oropharynx. Under direct visualization, the upper esophagus was intubated. The scope was advanced to the level of the third portion of duodenum. Scope was slowly withdrawn with careful inspection of the mucosal surfaces. The scope was retroflexed for inspection of the gastric fundus and incisura. Findings and maneuvers are listed in impression below. The patient tolerated the procedure well. The scope was removed. There were no immediate complications. Findings:   Esophagus: normal  There is no hiatal hernia present. Stomach:  abnormal: mild mucosal changes suggestive of gastritis noted -  Gastric biopsies were taken from the antrum and body to rule out Helicobacter pylori infection. Duodenum: normal mucosal appearance - biopsied to rule out inflammation/sprue      IMPRESSION:  1. S/p gastric and duodenal biopsies as above  2. No obvious luminal disease noted      RECOMMENDATIONS:    1. Await path results  2. Continue planned workup for gastroparesis (diagnosed as delayed emptying in  at [facility])      The results were discussed with the patient and family. A copy of the images obtained were given to the patient.      Melisa Hollis MD  2023  9:17 AM

## 2023-02-28 NOTE — ANESTHESIA PRE PROCEDURE
Department of Anesthesiology  Preprocedure Note       Name:  Lexus Peer   Age:  23 y. o.  :  2000                                          MRN:  [id number]         Date:  2023      Surgeon: Cynthia Blunt):  Jem Camp MD    Procedure: Procedure(s):  EGD BIOPSY    Medications prior to admission:   Prior to Admission medications    Medication Sig Start Date End Date Taking?  Authorizing Provider   metoprolol succinate (TOPROL XL) 25 MG extended release tablet Take 25 mg by mouth daily 23   Historical Provider, MD   bethanechol (URECHOLINE) 25 MG tablet Take 1 tablet by mouth 4 times daily (before meals and nightly) 23   LAUREN Lebron NP   sucralfate (CARAFATE) 1 GM tablet Take 1 tablet by mouth 4 times daily 23   LAUREN Lebron NP   ondansetron (ZOFRAN) 4 MG tablet Take 1 tablet by mouth every 6 hours as needed for Nausea 23   LAUREN Lebron NP   Cholecalciferol (VITAMIN D3) 50 MCG (2000 UT) CAPS Take by mouth daily    Historical Provider, MD   EPINEPHrine (SYMJEPI) 0.3 MG/0.3ML SOSY injection Inject 0.3 mg into the muscle as needed for Anaphylaxis  Patient not taking: Reported on 2023    Historical Provider, MD   metoclopramide (REGLAN) 10 MG tablet Take 10 mg by mouth 4 times daily    Historical Provider, MD   vitamin B-12 (CYANOCOBALAMIN) 500 MCG tablet Take 500 mcg by mouth daily    Historical Provider, MD   Zinc Sulfate (ZINC 15 PO) Take by mouth daily  Patient not taking: Reported on 2023    Historical Provider, MD   ferrous sulfate (IRON 325) 325 (65 Fe) MG tablet Take 325 mg by mouth daily (with breakfast)    Historical Provider, MD   erythromycin base (E-MYCIN) 250 MG tablet Take 250 mg by mouth 3 times daily  Patient not taking: Reported on 2023    Historical Provider, MD   escitalopram (LEXAPRO) 10 MG tablet Take 10 mg by mouth daily  20   Historical Provider, MD   omeprazole (PRILOSEC) 40 MG delayed release capsule Take 40 mg by mouth 2 times daily 12/7/20 2/16/23  Historical Provider, MD   spironolactone (ALDACTONE) 25 MG tablet Take 25 mg by mouth daily  9/12/20   Historical Provider, MD   cetirizine (ZYRTEC) 10 MG tablet Take 10 mg by mouth as needed for Allergies    Historical Provider, MD       Current medications:    Current Facility-Administered Medications   Medication Dose Route Frequency Provider Last Rate Last Admin    lactated ringers IV soln infusion   IntraVENous Continuous Antoni Murillo MD           Allergies:  No Known Allergies    Problem List:    Patient Active Problem List   Diagnosis Code    Palpitations R00.2       Past Medical History:        Diagnosis Date    Gastroparesis     Near syncope     Palpitation     PCOS (polycystic ovarian syndrome)     Seasonal allergies     Vertigo     related to severe allergy syndrome        Past Surgical History:        Procedure Laterality Date    SINUS ENDOSCOPY      2019    UPPER GASTROINTESTINAL ENDOSCOPY  01/08/2021    Eddie Sanchez MD    UPPER GASTROINTESTINAL ENDOSCOPY  11/14/2019    Larisa Wolf MD       Social History:    Social History     Tobacco Use    Smoking status: Never    Smokeless tobacco: Never   Substance Use Topics    Alcohol use: No                                Counseling given: Not Answered      Vital Signs (Current):   Vitals:    02/28/23 0834   BP: 133/87   Pulse: 75   Resp: 16   Temp: 97.3 °F (36.3 °C)   TempSrc: Tympanic   SpO2: 98%   Weight: 210 lb (95.3 kg)   Height: 5' 1\" (1.549 m)                                              BP Readings from Last 3 Encounters:   02/28/23 133/87   02/16/23 139/83   10/31/22 (!) 148/102       NPO Status:                                                                                 BMI:   Wt Readings from Last 3 Encounters:   02/28/23 210 lb (95.3 kg)   02/16/23 213 lb (96.6 kg)   10/31/22 210 lb (95.3 kg)     Body mass index is 39.68 kg/m².     CBC:   Lab Results   Component Value Date/Time WBC 7.0 10/12/2017 06:25 PM    RBC 5.20 10/12/2017 06:25 PM    HGB 15.4 10/12/2017 06:25 PM    HCT 44.6 10/12/2017 06:25 PM    MCV 85.8 10/12/2017 06:25 PM    RDW 11.9 10/12/2017 06:25 PM     10/12/2017 06:25 PM       CMP:   Lab Results   Component Value Date/Time     10/12/2017 06:25 PM    K 4.0 10/12/2017 06:25 PM     10/12/2017 06:25 PM    CO2 28 10/12/2017 06:25 PM    BUN 8 10/12/2017 06:25 PM    CREATININE 0.8 10/12/2017 06:25 PM    LABGLOM >60 10/12/2017 06:25 PM    GLUCOSE 82 10/12/2017 06:25 PM    PROT 7.3 10/12/2017 06:25 PM    CALCIUM 9.4 10/12/2017 06:25 PM    BILITOT 0.4 10/12/2017 06:25 PM    ALKPHOS 102 10/12/2017 06:25 PM    AST 22 10/12/2017 06:25 PM    ALT 13 10/12/2017 06:25 PM       POC Tests: No results for input(s): POCGLU, POCNA, POCK, POCCL, POCBUN, POCHEMO, POCHCT in the last 72 hours. Coags: No results found for: PROTIME, INR, APTT    HCG (If Applicable):   Lab Results   Component Value Date    PREGTESTUR Negative 10/12/2017        ABGs: No results found for: PHART, PO2ART, XWN6XXE, LKO1XSX, BEART, B1DLMTVD     Type & Screen (If Applicable):  No results found for: LABABO, LABRH    Drug/Infectious Status (If Applicable):  No results found for: HIV, HEPCAB    COVID-19 Screening (If Applicable): No results found for: COVID19        Anesthesia Evaluation    Airway: Mallampati: II          Dental:          Pulmonary:Negative Pulmonary ROS                              Cardiovascular:  Exercise tolerance: good (>4 METS),   (+) dysrhythmias:,         Rhythm: regular  Rate: normal           Beta Blocker:  Dose within 24 Hrs         Neuro/Psych:   (+) depression/anxiety             GI/Hepatic/Renal:   (+) GERD:,           Endo/Other:                     Abdominal:   (+) obese,           Vascular: Other Findings:           Anesthesia Plan      general and TIVA     ASA 2       Induction: intravenous.       Anesthetic plan and risks discussed with patient.                         Malcolm Gallardo, APRN - CRNA   2/28/2023

## 2023-02-28 NOTE — DISCHARGE INSTRUCTIONS
RECOMMENDATIONS:    1. Await path results  2.   Continue planned workup for gastroparesis (diagnosed as delayed emptying in April '21 at [facility])

## 2023-03-05 ENCOUNTER — PATIENT MESSAGE (OUTPATIENT)
Dept: GASTROENTEROLOGY | Age: 23
End: 2023-03-05

## 2023-03-06 ENCOUNTER — TELEPHONE (OUTPATIENT)
Dept: GASTROENTEROLOGY | Age: 23
End: 2023-03-06

## 2023-03-06 DIAGNOSIS — K31.84 GASTROPARESIS: Primary | ICD-10-CM

## 2023-03-06 NOTE — TELEPHONE ENCOUNTER
03-06-23 Sent my chart message that the referral her been started       Mica Venegas, the referral to [facility] has been started. The records were faxed [phone]. Once they receive the records then they will review them so it does take some time before they contact you for an apt.   Their phone number is [phone]

## 2023-03-06 NOTE — TELEPHONE ENCOUNTER
LAUREN Mariano - NP  to Jose Carlos Carrasco    10:26 AM  Yes, we can go ahead and move forward with referral to motility clinic. I will place the referral for you and we will be in contact. Last read by Elizabeth Pedroza at 10:36 AM on 3/6/2023.         Records Faxed to Dr [address]idy [address] [phone] Fax [phone]    Message sent to the patient that the referral her been started

## 2023-03-07 ENCOUNTER — TELEPHONE (OUTPATIENT)
Dept: GASTROENTEROLOGY | Age: 23
End: 2023-03-07

## 2023-03-07 NOTE — TELEPHONE ENCOUNTER
Patient called in advising motility clinic is not able to get her in until August patient wanting to see if she can be referred somewhere else that can get her in sooner  Please advise  Thank you

## 2023-03-08 NOTE — TELEPHONE ENCOUNTER
03-08-23 Called [place] they do not treat Gastroparesis. -23 Called the patient not available to leave a message.        My chart message sent to the patient

## 2023-03-14 ENCOUNTER — OFFICE VISIT (OUTPATIENT)
Dept: FAMILY MEDICINE CLINIC | Facility: CLINIC | Age: 23
End: 2023-03-14
Payer: COMMERCIAL

## 2023-03-14 VITALS
OXYGEN SATURATION: 97 % | DIASTOLIC BLOOD PRESSURE: 80 MMHG | SYSTOLIC BLOOD PRESSURE: 122 MMHG | HEIGHT: 63 IN | BODY MASS INDEX: 38.38 KG/M2 | HEART RATE: 89 BPM | RESPIRATION RATE: 18 BRPM | WEIGHT: 216.6 LBS | TEMPERATURE: 97.7 F

## 2023-03-14 DIAGNOSIS — K31.84 GASTROPARESIS: Primary | ICD-10-CM

## 2023-03-14 PROCEDURE — 99213 OFFICE O/P EST LOW 20 MIN: CPT | Performed by: NURSE PRACTITIONER

## 2023-03-14 RX ORDER — BETHANECHOL CHLORIDE 25 MG/1
25 TABLET ORAL
COMMUNITY
Start: 2023-02-16 | End: 2023-03-22

## 2023-03-14 RX ORDER — SUCRALFATE 1 G/1
1 TABLET ORAL 4 TIMES DAILY
COMMUNITY
Start: 2023-02-16 | End: 2023-03-22

## 2023-03-15 NOTE — PROGRESS NOTES
Subjective   Chief Complaint:  Stomachache.    History of Present Illness:    This 23 y.o. female was seen in the office today.    The patient presents today with complaints of stomachaches that have been a problem for 2-3 months. She reports she has gastroparesis which has been treated for approximately 5 years. She reports as of 01/2023 she has had a flare up and has been trying different medications with no relief. She explains she is looking for nutritional support until she is able to get a referral for a gastric stimulator. She states she is only on a small amount of liquids and she is not very sustainable. She reports she is drinking Gatorade as well as 1 protein shake a day. 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     She reports in a 24-hour period, she can hold down approximately 20 ounces of Gatorade and a 12 ounce bottle of [employer] meal replacement. She reports she feels full. She denies many issues with vomiting but has experienced episodes of dry heaving. The patient mentions she is open to a feeding tube. She states she has heard mixed results about acupuncture. She states she has taken erythromycin intermittently in the past. She reports she has tried Lexapro and Reglan as well with no relief. She reports after taking Reglan she was switched to a basic medication, also used for urine retention, that did not work either.    The patient denies losing 10% of her body weight in the last 3 to 6 months. The patient is followed by CHARLES Jc at [facility]. She reports her initial appointment projection for the gastric stimulator in [place], is in 04/2023. The patient states she weighed 222 pounds in 12/2022. She reports she has plenty of Zofran to take as needed. She states her abdominal pains have been intermittent today, which is normal for her.    No Known Allergies   Current Outpatient Medications on File Prior to Visit   Medication Sig   • bethanechol (URECHOLINE) 25 MG tablet  Take 1 tablet by mouth.   • cetirizine (zyrTEC) 10 MG tablet Take 1 tablet by mouth Daily As Needed for Allergies.   • Cholecalciferol (Vitamin D3) 50 MCG (2000 UT) tablet Take  by mouth.   • EPINEPHrine (EPIPEN) 0.3 MG/0.3ML solution auto-injector injection    • escitalopram (LEXAPRO) 10 MG tablet TAKE ONE TABLET BY MOUTH DAILY   • Ferrous Sulfate (IRON PO) Take  by mouth.   • metFORMIN (GLUCOPHAGE) 500 MG tablet Take 1 tablet by mouth 2 (Two) Times a Day With Meals.   • metoclopramide (REGLAN) 10 MG tablet Take 1 tablet by mouth.   • metoprolol succinate XL (TOPROL-XL) 25 MG 24 hr tablet Take 1 tablet by mouth Daily.   • omeprazole (priLOSEC) 40 MG capsule Take 1 capsule by mouth 2 (two) times a day.   • ondansetron (ZOFRAN) 4 MG tablet Take 1 tablet by mouth.   • spironolactone (ALDACTONE) 25 MG tablet TAKE ONE TABLET BY MOUTH TWICE A DAY   • sucralfate (CARAFATE) 1 g tablet Take 1 tablet by mouth 4 (Four) Times a Day.     No current facility-administered medications on file prior to visit.      Past Medical, Surgical, Social, and Family History:  Past Medical History:   Diagnosis Date   • Allergic     seasonal allergies   • Gastroparesis    • PCOS (polycystic ovarian syndrome)    • PCOS (polycystic ovarian syndrome)    • Vertigo     r/t to severe allergy symptoms     Past Surgical History:   Procedure Laterality Date   • ENDOSCOPY N/A 11/14/2019    Normal exam   • NO PAST SURGERIES       Social History     Socioeconomic History   • Marital status: Single   Tobacco Use   • Smoking status: Never   • Smokeless tobacco: Never   Vaping Use   • Vaping Use: Never used   Substance and Sexual Activity   • Alcohol use: No   • Drug use: No   • Sexual activity: Never     Birth control/protection: OCP     Family History   Problem Relation Age of Onset   • No Known Problems Father    • No Known Problems Mother    • No Known Problems Sister    • Diabetes Paternal Grandmother    • Breast cancer Maternal Grandmother    • Breast  "cancer Maternal Great-Grandmother    • Ovarian cancer Neg Hx    • Colon cancer Neg Hx    • Colon polyps Neg Hx        Objective   Physical Exam  Constitutional:       General: She is not in acute distress.     Appearance: She is obese. She is not ill-appearing.   Pulmonary:      Effort: Pulmonary effort is normal. No respiratory distress.   Neurological:      Mental Status: She is alert.     /80 (BP Location: Left arm, Patient Position: Sitting, Cuff Size: Adult)   Pulse 89   Temp 97.7 °F (36.5 °C) (Infrared)   Resp 18   Ht 158.8 cm (62.5\")   Wt 98.2 kg (216 lb 9.6 oz)   SpO2 97%   BMI 38.99 kg/m²     Prior Visit Notes/Records, Lab, Imaging, and Diagnostic Results Reviewed:  CBC:  Lab Results - Last 18 Months   Lab Units 02/09/23  2035 08/11/22  1214 01/31/22  1322   WBC K/cumm 7.2 8.28 7.48   HEMOGLOBIN g/dL 13.3 13.7 12.5   HEMATOCRIT % 39.8 42.6 37.7   PLATELETS K/cumm 332 328 346   IRON mcg/dL  --   --  50      Chemistry:  Lab Results - Last 18 Months   Lab Units 08/11/22  1214   SODIUM mmol/L 140   POTASSIUM mmol/L 4.5   CHLORIDE mmol/L 101   TOTAL CO2 mmol/L 27.0   GLUCOSE mg/dL 82   BUN mg/dL 10   CREATININE mg/dL 0.86   EGFR RESULT mL/min/1.73 96.9   CALCIUM mg/dL 9.9     Lab Results - Last 18 Months   Lab Units 08/11/22  1214   ALT (SGPT) U/L 26   AST (SGOT) U/L 28   ALK PHOS U/L 129*       Assessment & Plan   Diagnoses and all orders for this visit:    1. Gastroparesis (Primary)  -     Iron Profile  -     Ferritin  -     Vitamin D,25-Hydroxy  -     Vitamin B12 & Folate  -     Vitamin B1, Whole Blood  -     Prealbumin  -     CBC & Differential  -     Comprehensive Metabolic Panel    Discussion:  Advised and educated plan of care.  Advised liquid over-the-counter multivitamin daily.  She will obtain labs. Advised the patient I will get in contact with her primary care doctor after reviewing her results.?  Feeding tube or not, has not lost over 10% of weight in 3 months-can wait on labs to see if " any deficits first.     Class 2 Severe Obesity (BMI >=35 and <=39.9). Obesity-related health conditions include the following: hypertension. Obesity is unchanged. BMI is is above average; BMI management plan is completed. We discussed n/a - watching weight for unintentional loss due to underlying condition.    Follow-up:  No follow-ups on file.    Transcribed from ambient dictation for CHARLES Calvillo by Gisel Baker.  03/14/23   19:45 CDT    Patient or patient representative verbalized consent to the visit recording.  I have personally performed the services described in this document as transcribed by the above individual, and it is both accurate and complete.    Electronically signed by CHARLES Gonsalez 03/14/23, 7:45 PM CDT.

## 2023-03-18 LAB
25(OH)D3+25(OH)D2 SERPL-MCNC: 31.4 NG/ML (ref 30–100)
ALBUMIN SERPL-MCNC: 4.5 G/DL (ref 3.9–5)
ALBUMIN/GLOB SERPL: 1.6 {RATIO} (ref 1.2–2.2)
ALP SERPL-CCNC: 113 IU/L (ref 44–121)
ALT SERPL-CCNC: 22 IU/L (ref 0–32)
AST SERPL-CCNC: 26 IU/L (ref 0–40)
BASOPHILS # BLD AUTO: 0 X10E3/UL (ref 0–0.2)
BASOPHILS NFR BLD AUTO: 0 %
BILIRUB SERPL-MCNC: 0.6 MG/DL (ref 0–1.2)
BUN SERPL-MCNC: 9 MG/DL (ref 6–20)
BUN/CREAT SERPL: 11 (ref 9–23)
CALCIUM SERPL-MCNC: 9.5 MG/DL (ref 8.7–10.2)
CHLORIDE SERPL-SCNC: 99 MMOL/L (ref 96–106)
CO2 SERPL-SCNC: 23 MMOL/L (ref 20–29)
CREAT SERPL-MCNC: 0.83 MG/DL (ref 0.57–1)
EGFRCR SERPLBLD CKD-EPI 2021: 100 ML/MIN/1.73
EOSINOPHIL # BLD AUTO: 0.1 X10E3/UL (ref 0–0.4)
EOSINOPHIL NFR BLD AUTO: 1 %
ERYTHROCYTE [DISTWIDTH] IN BLOOD BY AUTOMATED COUNT: 13.7 % (ref 11.7–15.4)
FERRITIN SERPL-MCNC: 20 NG/ML (ref 15–150)
FOLATE SERPL-MCNC: 7.7 NG/ML
GLOBULIN SER CALC-MCNC: 2.9 G/DL (ref 1.5–4.5)
GLUCOSE SERPL-MCNC: NORMAL MG/DL
HCT VFR BLD AUTO: 41.2 % (ref 34–46.6)
HGB BLD-MCNC: 13.8 G/DL (ref 11.1–15.9)
IMM GRANULOCYTES # BLD AUTO: 0 X10E3/UL (ref 0–0.1)
IMM GRANULOCYTES NFR BLD AUTO: 0 %
IRON SATN MFR SERPL: 22 % (ref 15–55)
IRON SERPL-MCNC: 86 UG/DL (ref 27–159)
LYMPHOCYTES # BLD AUTO: 1.9 X10E3/UL (ref 0.7–3.1)
LYMPHOCYTES NFR BLD AUTO: 23 %
MCH RBC QN AUTO: 27.6 PG (ref 26.6–33)
MCHC RBC AUTO-ENTMCNC: 33.5 G/DL (ref 31.5–35.7)
MCV RBC AUTO: 82 FL (ref 79–97)
MONOCYTES # BLD AUTO: 0.6 X10E3/UL (ref 0.1–0.9)
MONOCYTES NFR BLD AUTO: 7 %
NEUTROPHILS # BLD AUTO: 5.7 X10E3/UL (ref 1.4–7)
NEUTROPHILS NFR BLD AUTO: 69 %
PLATELET # BLD AUTO: 346 X10E3/UL (ref 150–450)
POTASSIUM SERPL-SCNC: NORMAL MMOL/L
PREALB SERPL-MCNC: 24 MG/DL (ref 14–35)
PROT SERPL-MCNC: 7.4 G/DL (ref 6–8.5)
RBC # BLD AUTO: 5 X10E6/UL (ref 3.77–5.28)
SODIUM SERPL-SCNC: 140 MMOL/L (ref 134–144)
TIBC SERPL-MCNC: 384 UG/DL (ref 250–450)
UIBC SERPL-MCNC: 298 UG/DL (ref 131–425)
VIT B1 BLD-SCNC: 111.3 NMOL/L (ref 66.5–200)
VIT B12 SERPL-MCNC: 468 PG/ML (ref 232–1245)
WBC # BLD AUTO: 8.3 X10E3/UL (ref 3.4–10.8)

## 2023-03-20 NOTE — PROGRESS NOTES
Reviewed results - [employer] message sent.  If not seen in 3 days (3 day alert set), will send to pool to call the message.      To Dr. Liam madison - she is coming in for her regular visit, I saw her in between - has been having a gastroparesis flare-up and is awaiting a stimulator to be placed so I checked nutritional labs to see if needs anything additional.      Electronically signed by CHARLES Calvillo, 03/20/23, 8:12 AM CDT.

## 2023-03-22 ENCOUNTER — TELEPHONE (OUTPATIENT)
Dept: SURGERY | Age: 23
End: 2023-03-22

## 2023-03-22 ENCOUNTER — OFFICE VISIT (OUTPATIENT)
Dept: FAMILY MEDICINE CLINIC | Facility: CLINIC | Age: 23
End: 2023-03-22
Payer: COMMERCIAL

## 2023-03-22 VITALS
BODY MASS INDEX: 37.39 KG/M2 | HEART RATE: 86 BPM | SYSTOLIC BLOOD PRESSURE: 132 MMHG | OXYGEN SATURATION: 97 % | WEIGHT: 211 LBS | HEIGHT: 63 IN | DIASTOLIC BLOOD PRESSURE: 72 MMHG

## 2023-03-22 DIAGNOSIS — Z46.59 VISIT FOR FEEDING TUBE PLACEMENT: ICD-10-CM

## 2023-03-22 DIAGNOSIS — R11.0 NAUSEA: ICD-10-CM

## 2023-03-22 DIAGNOSIS — K31.84 GASTROPARESIS: Primary | ICD-10-CM

## 2023-03-22 DIAGNOSIS — R14.2 BELCHING: ICD-10-CM

## 2023-03-22 PROCEDURE — 99213 OFFICE O/P EST LOW 20 MIN: CPT | Performed by: FAMILY MEDICINE

## 2023-03-22 NOTE — PROGRESS NOTES
Subjective   Wendy Newby is a 23 y.o. female.     Chief Complaint   Patient presents with   • gastroparesis    • Fatigue        History of Present Illness     here today with mom--being evaluated by [facility] and [facility] cabral is pending      Current Outpatient Medications:   •  cetirizine (zyrTEC) 10 MG tablet, Take 1 tablet by mouth Daily As Needed for Allergies., Disp: , Rfl:   •  EPINEPHrine (EPIPEN) 0.3 MG/0.3ML solution auto-injector injection, , Disp: , Rfl:   •  escitalopram (LEXAPRO) 10 MG tablet, TAKE ONE TABLET BY MOUTH DAILY, Disp: 30 tablet, Rfl: 5  •  metoprolol succinate XL (TOPROL-XL) 25 MG 24 hr tablet, Take 1 tablet by mouth Daily., Disp: , Rfl:   •  omeprazole (priLOSEC) 40 MG capsule, Take 1 capsule by mouth 2 (two) times a day., Disp: , Rfl:   •  ondansetron (ZOFRAN) 4 MG tablet, Take 1 tablet by mouth., Disp: , Rfl:   •  spironolactone (ALDACTONE) 25 MG tablet, TAKE ONE TABLET BY MOUTH TWICE A DAY, Disp: 60 tablet, Rfl: 1  No Known Allergies    Class 2 Severe Obesity (BMI >=35 and <=39.9). Obesity-related health conditions include the following: none. Obesity is unchanged. BMI is is above average; BMI management plan is completed. We discussed portion control and increasing exercise.      Past Medical History:   Diagnosis Date   • Allergic     seasonal allergies   • Gastroparesis    • PCOS (polycystic ovarian syndrome)    • PCOS (polycystic ovarian syndrome)    • Vertigo     r/t to severe allergy symptoms     Past Surgical History:   Procedure Laterality Date   • ENDOSCOPY N/A 11/14/2019    Normal exam   • NO PAST SURGERIES         Review of Systems   Constitutional: Positive for fatigue.   HENT: Negative.    Eyes: Negative.    Respiratory: Negative.    Cardiovascular: Negative.    Gastrointestinal: Positive for abdominal distention, abdominal pain, diarrhea and nausea.   Endocrine: Negative.    Genitourinary: Negative.    Musculoskeletal: Negative.    Skin: Negative.    Allergic/Immunologic:  "Negative.    Neurological: Negative.    Hematological: Negative.    Psychiatric/Behavioral: Negative.        Objective  /72   Pulse 86   Ht 158.8 cm (62.5\")   Wt 95.7 kg (211 lb)   SpO2 97%   BMI 37.98 kg/m²   Physical Exam  Vitals and nursing note reviewed.   Constitutional:       Appearance: Normal appearance. She is normal weight.   HENT:      Head: Normocephalic and atraumatic.      Nose: Nose normal.      Mouth/Throat:      Mouth: Mucous membranes are moist.      Pharynx: Oropharynx is clear.   Eyes:      Extraocular Movements: Extraocular movements intact.      Conjunctiva/sclera: Conjunctivae normal.      Pupils: Pupils are equal, round, and reactive to light.   Cardiovascular:      Rate and Rhythm: Normal rate and regular rhythm.      Pulses: Normal pulses.      Heart sounds: Normal heart sounds.   Pulmonary:      Effort: Pulmonary effort is normal.      Breath sounds: Normal breath sounds.   Abdominal:      General: Abdomen is flat. Bowel sounds are normal.      Palpations: Abdomen is soft.   Musculoskeletal:         General: Normal range of motion.      Cervical back: Normal range of motion and neck supple.   Skin:     General: Skin is warm.      Capillary Refill: Capillary refill takes less than 2 seconds.   Neurological:      General: No focal deficit present.      Mental Status: She is alert and oriented to person, place, and time. Mental status is at baseline.   Psychiatric:         Mood and Affect: Mood normal.         Assessment & Plan   Diagnoses and all orders for this visit:    1. Gastroparesis (Primary)  -     Ambulatory Referral to General Surgery    2. Nausea  -     Ambulatory Referral to General Surgery    3. Belching  -     Ambulatory Referral to General Surgery    4. Visit for feeding tube placement  -     Ambulatory Referral to General Surgery                 Orders Placed This Encounter   Procedures   • Ambulatory Referral to General Surgery     Referral Priority:   Urgent     " Referral Type:   Consultation     Referral Reason:   Specialty Services Required     Requested Specialty:   General Surgery     Number of Visits Requested:   1       Follow up: 2 month(s)

## 2023-03-22 NOTE — TELEPHONE ENCOUNTER
Called Dr. Roddy Holley office to let them know after receiving correspondence from them via fax that we do not treat gastroparesis. REcommend motility clinic at [facility] or f/u at [facility] in [place] where diagnosed.

## 2023-03-23 DIAGNOSIS — K31.84 GASTROPARESIS: Primary | ICD-10-CM

## 2023-03-23 DIAGNOSIS — R63.4 WEIGHT LOSS: ICD-10-CM

## 2023-03-23 DIAGNOSIS — Z46.59 VISIT FOR FEEDING TUBE PLACEMENT: ICD-10-CM

## 2023-04-01 DIAGNOSIS — R79.89 ELEVATED TESTOSTERONE LEVEL: ICD-10-CM

## 2023-04-03 RX ORDER — SPIRONOLACTONE 25 MG/1
TABLET ORAL
Qty: 60 TABLET | Refills: 1 | Status: SHIPPED | OUTPATIENT
Start: 2023-04-03

## 2023-04-07 RX ORDER — SODIUM CHLORIDE 9 MG/ML
1000 INJECTION, SOLUTION INTRAVENOUS ONCE
Status: CANCELLED
Start: 2023-04-10 | End: 2023-04-10

## 2023-04-10 ENCOUNTER — INFUSION (OUTPATIENT)
Dept: ONCOLOGY | Facility: HOSPITAL | Age: 23
End: 2023-04-10
Payer: COMMERCIAL

## 2023-04-10 VITALS
TEMPERATURE: 97.4 F | WEIGHT: 212 LBS | DIASTOLIC BLOOD PRESSURE: 54 MMHG | RESPIRATION RATE: 18 BRPM | BODY MASS INDEX: 37.56 KG/M2 | OXYGEN SATURATION: 100 % | SYSTOLIC BLOOD PRESSURE: 117 MMHG | HEART RATE: 79 BPM | HEIGHT: 63 IN

## 2023-04-10 DIAGNOSIS — R11.0 NAUSEA: ICD-10-CM

## 2023-04-10 DIAGNOSIS — K31.84 GASTROPARESIS: Primary | ICD-10-CM

## 2023-04-10 PROCEDURE — 96360 HYDRATION IV INFUSION INIT: CPT

## 2023-04-10 PROCEDURE — 96361 HYDRATE IV INFUSION ADD-ON: CPT

## 2023-04-10 RX ORDER — SODIUM CHLORIDE 9 MG/ML
1000 INJECTION, SOLUTION INTRAVENOUS ONCE
Status: CANCELLED
Start: 2023-04-12 | End: 2023-04-12

## 2023-04-10 RX ORDER — SODIUM CHLORIDE 9 MG/ML
1000 INJECTION, SOLUTION INTRAVENOUS ONCE
Status: COMPLETED | OUTPATIENT
Start: 2023-04-10 | End: 2023-04-10

## 2023-04-10 RX ADMIN — SODIUM CHLORIDE 1000 ML: 9 INJECTION, SOLUTION INTRAVENOUS at 11:21

## 2023-04-12 ENCOUNTER — INFUSION (OUTPATIENT)
Dept: ONCOLOGY | Facility: HOSPITAL | Age: 23
End: 2023-04-12
Payer: COMMERCIAL

## 2023-04-12 VITALS
TEMPERATURE: 97.6 F | HEART RATE: 87 BPM | RESPIRATION RATE: 18 BRPM | BODY MASS INDEX: 40.4 KG/M2 | OXYGEN SATURATION: 100 % | SYSTOLIC BLOOD PRESSURE: 137 MMHG | DIASTOLIC BLOOD PRESSURE: 81 MMHG | WEIGHT: 214 LBS | HEIGHT: 61 IN

## 2023-04-12 DIAGNOSIS — K31.84 GASTROPARESIS: Primary | ICD-10-CM

## 2023-04-12 DIAGNOSIS — R11.0 NAUSEA: ICD-10-CM

## 2023-04-12 PROCEDURE — 96361 HYDRATE IV INFUSION ADD-ON: CPT

## 2023-04-12 PROCEDURE — 96360 HYDRATION IV INFUSION INIT: CPT

## 2023-04-12 RX ORDER — SODIUM CHLORIDE 9 MG/ML
1000 INJECTION, SOLUTION INTRAVENOUS ONCE
Status: CANCELLED
Start: 2023-04-14 | End: 2023-04-14

## 2023-04-12 RX ORDER — SODIUM CHLORIDE 9 MG/ML
1000 INJECTION, SOLUTION INTRAVENOUS ONCE
Status: COMPLETED | OUTPATIENT
Start: 2023-04-12 | End: 2023-04-12

## 2023-04-12 RX ADMIN — SODIUM CHLORIDE 1000 ML: 9 INJECTION, SOLUTION INTRAVENOUS at 13:55

## 2023-04-14 ENCOUNTER — INFUSION (OUTPATIENT)
Dept: ONCOLOGY | Facility: HOSPITAL | Age: 23
End: 2023-04-14
Payer: COMMERCIAL

## 2023-04-14 VITALS
BODY MASS INDEX: 40.22 KG/M2 | OXYGEN SATURATION: 99 % | TEMPERATURE: 97.4 F | RESPIRATION RATE: 16 BRPM | SYSTOLIC BLOOD PRESSURE: 129 MMHG | HEIGHT: 61 IN | DIASTOLIC BLOOD PRESSURE: 59 MMHG | WEIGHT: 213 LBS | HEART RATE: 79 BPM

## 2023-04-14 DIAGNOSIS — R11.0 NAUSEA: ICD-10-CM

## 2023-04-14 DIAGNOSIS — K31.84 GASTROPARESIS: Primary | ICD-10-CM

## 2023-04-14 PROCEDURE — 96360 HYDRATION IV INFUSION INIT: CPT

## 2023-04-14 PROCEDURE — 96361 HYDRATE IV INFUSION ADD-ON: CPT

## 2023-04-14 RX ORDER — SODIUM CHLORIDE 9 MG/ML
1000 INJECTION, SOLUTION INTRAVENOUS ONCE
Status: CANCELLED
Start: 2023-04-17 | End: 2023-04-17

## 2023-04-14 RX ORDER — SODIUM CHLORIDE 9 MG/ML
1000 INJECTION, SOLUTION INTRAVENOUS ONCE
Status: COMPLETED | OUTPATIENT
Start: 2023-04-14 | End: 2023-04-14

## 2023-04-14 RX ADMIN — SODIUM CHLORIDE 1000 ML: 9 INJECTION, SOLUTION INTRAVENOUS at 14:18

## 2023-04-17 ENCOUNTER — INFUSION (OUTPATIENT)
Dept: ONCOLOGY | Facility: HOSPITAL | Age: 23
End: 2023-04-17
Payer: COMMERCIAL

## 2023-04-17 VITALS
HEART RATE: 78 BPM | OXYGEN SATURATION: 99 % | SYSTOLIC BLOOD PRESSURE: 116 MMHG | TEMPERATURE: 97.8 F | BODY MASS INDEX: 40.02 KG/M2 | WEIGHT: 212 LBS | DIASTOLIC BLOOD PRESSURE: 58 MMHG | RESPIRATION RATE: 17 BRPM | HEIGHT: 61 IN

## 2023-04-17 DIAGNOSIS — R11.0 NAUSEA: ICD-10-CM

## 2023-04-17 DIAGNOSIS — K31.84 GASTROPARESIS: Primary | ICD-10-CM

## 2023-04-17 PROCEDURE — 96360 HYDRATION IV INFUSION INIT: CPT

## 2023-04-17 PROCEDURE — 96361 HYDRATE IV INFUSION ADD-ON: CPT

## 2023-04-17 RX ORDER — SODIUM CHLORIDE 9 MG/ML
1000 INJECTION, SOLUTION INTRAVENOUS ONCE
Status: COMPLETED | OUTPATIENT
Start: 2023-04-17 | End: 2023-04-17

## 2023-04-17 RX ORDER — SODIUM CHLORIDE 9 MG/ML
1000 INJECTION, SOLUTION INTRAVENOUS ONCE
Status: CANCELLED
Start: 2023-04-19 | End: 2023-04-19

## 2023-04-17 RX ADMIN — SODIUM CHLORIDE 1000 ML: 9 INJECTION, SOLUTION INTRAVENOUS at 13:57

## 2023-04-18 ENCOUNTER — CLINICAL SUPPORT (OUTPATIENT)
Dept: GENETICS | Facility: HOSPITAL | Age: 23
End: 2023-04-18

## 2023-04-18 DIAGNOSIS — Z80.3 FAMILY HISTORY OF BREAST CANCER: ICD-10-CM

## 2023-04-18 DIAGNOSIS — Z13.79 GENETIC TESTING: Primary | ICD-10-CM

## 2023-04-18 NOTE — PROGRESS NOTES
Wendy Newby, a 23-year-old female, was referred for genetic counseling due to a family history of breast cancer. Genetic counseling was performed via telephone. Ms. Newby confirmed her full name, date of birth, and that she was physically located in the [facility] at the time of the appointment. Ms. Newby has no personal history of cancer. She was 12 at menarche. She is pre-menopausal and retains her uterus and ovaries. She has never had a mammogram or a colonoscopy. She was interested in discussing her risk for a hereditary cancer syndrome. Ms. Newby decided to pursue comprehensive genetic testing to evaluate her risk of cancer, therefore the CancerNext Panel was ordered through [employer] which analyzes 36 genes associated with an increased cancer risk. A saliva kit will be mailed to her home for sample collection. Results are expected 2-3 weeks after Kaitlynn receives her sample.      PERTINENT FAMILY HISTORY: (See attached pedigree)   Mat. Grandmother:   Breast cancer, 81  Mat. Grandfather:   Prostate cancer, 80  Mat. Great-Uncle:   Multiple myeloma  Mat. Great-Grandmother:  Breast cancer, 80s  Pat. Grandfather:   Melanoma, 80s    We do not have medical records regarding any of these diagnoses.     RISK ASSESSMENT:  Ms. Newby’s family history of breast cancer raises the question of a hereditary cancer syndrome.  NCCN guidelines for genetic testing for BRCA1/2 states that individuals with three or more close blood relatives diagnosed with breast cancer at any age may consider genetic testing. Based on Ms. Newby’s maternal family history, she would not meet this criteria. Despite this, we discussed how genetic testing is still available to her. This risk assessment is based on the family history information provided at the time of the appointment.  The assessment could change in the future should new information be obtained.    GENETIC COUNSELING (30 minutes):  We reviewed the family history  information in detail. Cases of cancer follow three general patterns: sporadic, familial, and hereditary.  While most cancer is sporadic, some cases appear to occur in family clusters.  These cases are said to be familial and account for 10-20% of cancer cases.  Familial cases may be due to a combination of shared genes and environmental factors among family members.  In even fewer cases, the risk for cancer is inherited, and the genes responsible for the increased cancer risk are known.       Family histories typical of hereditary cancer syndromes usually include multiple first- and second-degree relatives diagnosed with cancer types that define a syndrome.  These cases tend to be diagnosed at younger-than-expected ages and can be bilateral or multifocal.  The cancer in these families follows an autosomal dominant inheritance pattern, which indicates the likely presence of a mutation in a cancer susceptibility gene.  Children and siblings of an individual believed to carry this mutation have a 50% chance of inheriting that mutation, thereby inheriting the increased risk to develop cancer.  These mutations can be passed down from the maternal or the paternal lineage.     Due to Ms. Newby’s family history of breast cancer, we discussed hereditary breast cancer. Hereditary breast cancer accounts for 5-10% of all cases of breast cancer.  A significant proportion of hereditary breast cancer can be attributed to mutations in the BRCA1 and BRCA2 genes.  Mutations in these genes confer an increased risk for breast cancer, ovarian cancer, male breast cancer, prostate cancer, and pancreatic cancer.  Women with a BRCA1 or BRCA2 mutation have up to an 87% lifetime risk of breast cancer and up to a 44% risk of ovarian cancer.  There are other clinically significant breast cancer related genes in addition to BRCA1/2.      There are other genes that are known to be associated with an increased risk for cancer.  Some of these  genes have well defined cancer risks and established management guidelines.  Other genes that can be tested for have been more recently described, and there may be less data regarding the risks and therefore may not have established management guidelines. We discussed these limitations at length.  Based on Ms. Newby’s desire to get as much information as possible regarding her personal risks and potential risks for her family, she opted to pursue testing through a panel that would look at several other genes known to increase the risk for cancer.      GENETIC TESTING:  The risks, benefits, and limitations of genetic testing and implications for clinical management following testing were reviewed.  DNA test results can influence decisions regarding screening and prevention.  Genetic testing can have significant psychological implications for both individuals and families. Also discussed was the possibility of employment and insurance discrimination based on genetic test results and the laws in place to prevent this, as well as the limitations of these laws.       We discussed panel testing, which would involve testing 36 genes associated with increased cancer risk. The implications of a positive or negative test result were discussed.  We also discussed the importance of testing an affected relative and how a negative result for Ms. Newby wouldn’t necessarily mean the cancers presenting in her family weren’t due to a genetic mutation that Ms. Newby did not inherit.  In general, a negative genetic test result is most informative if a mutation has first been established in an affected member of the family.  In cases where an affected individual is not available or interested in testing, it is appropriate to offer testing to an unaffected individual.     We discussed the possibility that, in some cases, genetic test results may be ambiguous due to the identification of a genetic variant of uncertain significance  (VUS). These variants may or may not be associated with an increased cancer risk. With multigene panel testing, it is not uncommon for a VUS to be identified.  If a VUS is identified, testing family members is typically not recommended and screening recommendations are made based on the family history.  The laboratories that perform genetic testing work to reclassify the VUS and send out an amended report if and when a VUS is reclassified.  The majority of variant findings are ultimately reclassified to a negative result. Given Ms. Newby’s family history, a negative test result does not eliminate all cancer risk, although the risk may not be as high as it would with positive genetic testing.     PLAN: Genetic testing was ordered via the CancerNext Panel through [email or web address]. Results are expected 2-3 weeks after ADTZmacho receives her sample. If she has any questions in the meantime, she is welcome to call me at [phone].      Betty Ko, MS, [facility], [facility]  Licensed Certified Genetic Counselor

## 2023-04-19 ENCOUNTER — INFUSION (OUTPATIENT)
Dept: ONCOLOGY | Facility: HOSPITAL | Age: 23
End: 2023-04-19
Payer: COMMERCIAL

## 2023-04-19 VITALS
SYSTOLIC BLOOD PRESSURE: 130 MMHG | RESPIRATION RATE: 18 BRPM | HEIGHT: 61 IN | OXYGEN SATURATION: 98 % | BODY MASS INDEX: 40.22 KG/M2 | TEMPERATURE: 98 F | DIASTOLIC BLOOD PRESSURE: 77 MMHG | WEIGHT: 213 LBS | HEART RATE: 66 BPM

## 2023-04-19 DIAGNOSIS — R11.0 NAUSEA: ICD-10-CM

## 2023-04-19 DIAGNOSIS — K31.84 GASTROPARESIS: Primary | ICD-10-CM

## 2023-04-19 PROCEDURE — 96360 HYDRATION IV INFUSION INIT: CPT

## 2023-04-19 PROCEDURE — 96361 HYDRATE IV INFUSION ADD-ON: CPT

## 2023-04-19 RX ORDER — SODIUM CHLORIDE 9 MG/ML
1000 INJECTION, SOLUTION INTRAVENOUS ONCE
Status: CANCELLED
Start: 2023-04-21 | End: 2023-04-21

## 2023-04-19 RX ORDER — SODIUM CHLORIDE 9 MG/ML
1000 INJECTION, SOLUTION INTRAVENOUS ONCE
Status: COMPLETED | OUTPATIENT
Start: 2023-04-19 | End: 2023-04-19

## 2023-04-19 RX ADMIN — SODIUM CHLORIDE 1000 ML: 9 INJECTION, SOLUTION INTRAVENOUS at 14:09

## 2023-04-21 ENCOUNTER — INFUSION (OUTPATIENT)
Dept: ONCOLOGY | Facility: HOSPITAL | Age: 23
End: 2023-04-21
Payer: COMMERCIAL

## 2023-04-21 VITALS
RESPIRATION RATE: 16 BRPM | WEIGHT: 212 LBS | HEIGHT: 61 IN | HEART RATE: 92 BPM | BODY MASS INDEX: 40.02 KG/M2 | DIASTOLIC BLOOD PRESSURE: 63 MMHG | OXYGEN SATURATION: 93 % | TEMPERATURE: 98.5 F | SYSTOLIC BLOOD PRESSURE: 110 MMHG

## 2023-04-21 DIAGNOSIS — R11.0 NAUSEA: ICD-10-CM

## 2023-04-21 DIAGNOSIS — K31.84 GASTROPARESIS: Primary | ICD-10-CM

## 2023-04-21 PROCEDURE — 96361 HYDRATE IV INFUSION ADD-ON: CPT

## 2023-04-21 PROCEDURE — 96360 HYDRATION IV INFUSION INIT: CPT

## 2023-04-21 RX ORDER — SODIUM CHLORIDE 9 MG/ML
1000 INJECTION, SOLUTION INTRAVENOUS ONCE
Status: COMPLETED | OUTPATIENT
Start: 2023-04-21 | End: 2023-04-21

## 2023-04-21 RX ORDER — SODIUM CHLORIDE 9 MG/ML
1000 INJECTION, SOLUTION INTRAVENOUS ONCE
Status: CANCELLED
Start: 2023-04-21 | End: 2023-04-21

## 2023-04-21 RX ADMIN — SODIUM CHLORIDE 1000 ML: 9 INJECTION, SOLUTION INTRAVENOUS at 13:50

## 2023-04-25 ENCOUNTER — OFFICE VISIT (OUTPATIENT)
Dept: GASTROENTEROLOGY | Age: 23
End: 2023-04-25
Payer: COMMERCIAL

## 2023-04-25 VITALS
WEIGHT: 209 LBS | HEART RATE: 100 BPM | OXYGEN SATURATION: 99 % | BODY MASS INDEX: 39.46 KG/M2 | DIASTOLIC BLOOD PRESSURE: 80 MMHG | SYSTOLIC BLOOD PRESSURE: 120 MMHG | HEIGHT: 61 IN

## 2023-04-25 DIAGNOSIS — K31.84 GASTROPARESIS: Primary | ICD-10-CM

## 2023-04-25 DIAGNOSIS — K21.9 CHRONIC GERD: ICD-10-CM

## 2023-04-25 PROCEDURE — 99214 OFFICE O/P EST MOD 30 MIN: CPT | Performed by: NURSE PRACTITIONER

## 2023-04-25 RX ORDER — OMEPRAZOLE 40 MG/1
40 CAPSULE, DELAYED RELEASE ORAL 2 TIMES DAILY
Qty: 60 CAPSULE | Refills: 28 | Status: CANCELLED | OUTPATIENT
Start: 2023-04-25 | End: 2025-09-10

## 2023-04-25 ASSESSMENT — ENCOUNTER SYMPTOMS
SHORTNESS OF BREATH: 0
DIARRHEA: 0
BLOOD IN STOOL: 0
ABDOMINAL DISTENTION: 0
RECTAL PAIN: 0
COUGH: 0
TROUBLE SWALLOWING: 0
NAUSEA: 1
VOMITING: 1
ABDOMINAL PAIN: 0
CONSTIPATION: 0
CHOKING: 0
ANAL BLEEDING: 0

## 2023-04-25 NOTE — PROGRESS NOTES
Subjective:     Patient ID: Alen Angelucci is a 20 y.o. female  PCP: Mavis Rivera MD  Referring Provider: No ref. provider found    HPI  Patient presents to the office today with the following complaints: Follow-up      Pt seen today for follow up after EGD on 2/28/2023. Hx gastroparesis. She has seen Dr. Colton Golden. Pt states they are working her up for pyloroplasty. She is getting fluids every couple of weeks through PCP. She has tried and failed Reglan and Bethanechol. She states she is able to tolerate very little liquids and solids. She also has appt with Dr Diana Paez coming up in August.       EGD Findings 2/28/2023:   Esophagus: normal  There is no hiatal hernia present. Stomach:  abnormal: mild mucosal changes suggestive of gastritis noted -  Gastric biopsies were taken from the antrum and body to rule out Helicobacter pylori infection. Duodenum: normal mucosal appearance - biopsied to rule out inflammation/sprue  IMPRESSION:  1. S/p gastric and duodenal biopsies as above  2. No obvious luminal disease noted   RECOMMENDATIONS:    1. Await path results  2. Continue planned workup for gastroparesis (diagnosed as delayed emptying in April '21 at [facility])    FINAL DIAGNOSIS:   A.  Small intestine, duodenal biopsy:   Benign small intestinal mucosa with focal mild chronic nonspecific inflammation, negative for evidence of sprue. B.  Stomach, gastric biopsies:   1. Benign gastric mucosa with minimal chronic inflammation. 2.  No Helicobacter pylori organisms identified by immunohistochemical stain. Assessment:     1. Gastroparesis    2. Chronic GERD            Plan:   - Keep scheduled follow ups with Dr. Colton Golden   - Continue Omeprazole 40 mg BID   - Ok for Zofran 4 mg prn nausea  - Call with any questions or concerns  - Follow up prn or sooner if needed      Orders  No orders of the defined types were placed in this encounter.     Medications  No orders of the defined types were placed in this

## 2023-05-08 ENCOUNTER — TELEPHONE (OUTPATIENT)
Dept: FAMILY MEDICINE CLINIC | Facility: CLINIC | Age: 23
End: 2023-05-08

## 2023-05-08 NOTE — TELEPHONE ENCOUNTER
Caller: MELVA MUNIZ     Relationship: SELF   Best call back number: [phone]    What form or medical record are you requesting: LIST OF HER MEDICATIONS     Who is requesting this form or medical record from you: STATES SELF -STATES SHE IS GOING ON AN INTERNATIONAL TRIP   How would you like to receive the form or medical records (pick-up, mail, fax):    If fax, what is the fax number:   If mail, what is the address:   If pick-up, provide patient with address and location details    Timeframe paperwork needed: SOON  SHE WILL BE LEAVING MAY 12     Additional notes: NONE

## 2023-05-09 NOTE — TELEPHONE ENCOUNTER
Called pt to let her know I printed a copy of her med list and left it upfront. (in the drawer by check in)

## 2023-06-23 PROBLEM — H93.11 TINNITUS OF RIGHT EAR: Status: ACTIVE | Noted: 2023-06-23

## 2023-06-23 PROBLEM — H91.91 HEARING LOSS OF RIGHT EAR: Status: ACTIVE | Noted: 2023-06-23

## 2023-12-18 RX ORDER — ESCITALOPRAM OXALATE 10 MG/1
10 TABLET ORAL DAILY
Qty: 30 TABLET | Refills: 5 | Status: SHIPPED | OUTPATIENT
Start: 2023-12-18

## 2024-01-04 ENCOUNTER — HOSPITAL ENCOUNTER (EMERGENCY)
Age: 24
Discharge: HOME | End: 2024-01-05
Attending: EMERGENCY MEDICINE
Payer: COMMERCIAL

## 2024-01-04 ENCOUNTER — APPOINTMENT (OUTPATIENT)
Dept: GENERAL RADIOLOGY | Age: 24
End: 2024-01-04
Payer: COMMERCIAL

## 2024-01-04 VITALS
WEIGHT: 200 LBS | DIASTOLIC BLOOD PRESSURE: 100 MMHG | OXYGEN SATURATION: 97 % | RESPIRATION RATE: 17 BRPM | HEART RATE: 84 BPM | BODY MASS INDEX: 37.79 KG/M2 | SYSTOLIC BLOOD PRESSURE: 143 MMHG

## 2024-01-04 DIAGNOSIS — T85.598A OBSTRUCTION OF NASOGASTRIC TUBE: Primary | ICD-10-CM

## 2024-01-04 PROCEDURE — 71045 X-RAY EXAM CHEST 1 VIEW: CPT

## 2024-01-04 PROCEDURE — 99283 EMERGENCY DEPT VISIT LOW MDM: CPT

## 2024-01-04 ASSESSMENT — ENCOUNTER SYMPTOMS
SHORTNESS OF BREATH: 0
ABDOMINAL PAIN: 0
EYE DISCHARGE: 0
ABDOMINAL DISTENTION: 0
BACK PAIN: 0
EYE PAIN: 0
COUGH: 0
SORE THROAT: 0
NAUSEA: 0
APNEA: 0
PHOTOPHOBIA: 0
COLOR CHANGE: 0
RHINORRHEA: 0

## 2024-01-05 NOTE — ED PROVIDER NOTES
[facility] EMERGENCY DEPT  eMERGENCYdEPARTMENT eNCOUnter      Pt Name: Elena Swartz  MRN: [id number]  Birthdate 2000  Date of evaluation: 1/4/2024  Provider:OSIRIS Garibay    CHIEF COMPLAINT       Chief Complaint   Patient presents with    Feeding Tube Problem     NG placed today. Pt states they are unable to get anything through it.          HISTORY OF PRESENT ILLNESS  (Location/Symptom, Timing/Onset, Context/Setting, Quality, Duration, Modifying Factors, Severity.)   Elena Swartz is a 23 y.o. female who presents to the emergency department with complaints of nasal jejunal tube. Concern for flush. Saw dr monroe today at [facility] gastroporesis specialist placed today. Concern for malfunction. In mild discomfort. Denies fever chills. Patient states unable to flush.     [facility]    Nursing Notes were reviewed and I agree.    REVIEW OF SYSTEMS    (2-9 systems for level 4, 10 or more for level 5)     Review of Systems   Constitutional:  Negative for activity change, appetite change, chills and fever.   HENT:  Negative for congestion, postnasal drip, rhinorrhea and sore throat.    Eyes:  Negative for photophobia, pain, discharge and visual disturbance.   Respiratory:  Negative for apnea, cough and shortness of breath.    Cardiovascular:  Negative for chest pain and leg swelling.   Gastrointestinal:  Negative for abdominal distention, abdominal pain and nausea.   Genitourinary:  Negative for vaginal bleeding.   Musculoskeletal:  Negative for arthralgias, back pain, joint swelling, neck pain and neck stiffness.   Skin:  Negative for color change and rash.   Neurological:  Negative for dizziness, syncope, facial asymmetry and headaches.   Hematological:  Negative for adenopathy. Does not bruise/bleed easily.   Psychiatric/Behavioral:  Negative for agitation, behavioral problems and confusion.         Except as noted above the remainder of the review of systems was reviewed and negative.       PAST MEDICAL HISTORY     Past Medical  normal. No respiratory distress.      Breath sounds: Normal breath sounds. No stridor. No wheezing.   Abdominal:      General: Bowel sounds are normal. There is no distension.      Palpations: Abdomen is soft.      Tenderness: There is no abdominal tenderness.   Musculoskeletal:         General: Normal range of motion.      Cervical back: Normal range of motion and neck supple.   Skin:     General: Skin is warm and dry.      Capillary Refill: Capillary refill takes less than 2 seconds.      Findings: No rash.   Neurological:      Mental Status: She is alert and oriented to person, place, and time.      Cranial Nerves: No cranial nerve deficit.      Sensory: No sensory deficit.      Coordination: Coordination normal.   Psychiatric:         Behavior: Behavior normal.         Thought Content: Thought content normal.           DIAGNOSTIC RESULTS     RADIOLOGY:   Non-plain film images such as CT, Ultrasound and MRI are read by the radiologist. Plain radiographic images are visualized and preliminarilyinterpreted by No att. providers found with the below findings:        Interpretation per the Radiologist below, if available at the time of this note:    XR CHEST PORTABLE   Final Result   Impression:  Nasogastric tube placement           ______________________________________    Electronically signed by: THIEN ADAIR M.D.   Date:     01/04/2024   Time:    23:11           LABS:  Labs Reviewed - No data to display    All other labs were within normal range or notreturned as of this dictation.    RE-ASSESSMENT          EMERGENCY DEPARTMENT COURSE and DIFFERENTIAL DIAGNOSIS/MDM:   Vitals:    Vitals:    01/04/24 2225   BP: (!) 143/100   Pulse: 84   Resp: 17   SpO2: 97%   Weight: 90.7 kg (200 lb)         MDM  Plan to pass care over to Dr Mckenzie on evening shift. Awaiting [facility] to call us back placement currently in the stomach felt with inability to flush and patient gagging discomfort would make sense to pull. Guarded.

## 2024-01-05 NOTE — ED PROVIDER NOTES
Attending Supervisory Note/Shared Visit   I have personally performed a face to face diagnostic evaluation on this patient. I have reviewed the mid-level’s findings and agree.     Discussed with Dr. Anamika BUTTS at [facility].  Recommended trying coke or bicarb to try to flush and they will be in contact with her tomorrow to arrange appt next week for replacement.  If unsuccessful ok to remove tube.  Pt did not wish to try coke or bicarb wants tube out so was removed and found to have a knot tied in distal area likely occurred while in stomach and twisted.  Understands return precautions.      FINAL IMPRESSION      1. Obstruction of nasogastric tube          DERRELL HUGO MD  Attending Emergency Physician        Derrell Hugo MD  01/05/24 0990

## 2024-01-09 ENCOUNTER — OFFICE VISIT (OUTPATIENT)
Dept: FAMILY MEDICINE CLINIC | Facility: CLINIC | Age: 24
End: 2024-01-09
Payer: COMMERCIAL

## 2024-01-09 VITALS
HEIGHT: 61 IN | OXYGEN SATURATION: 97 % | WEIGHT: 212 LBS | RESPIRATION RATE: 18 BRPM | HEART RATE: 105 BPM | TEMPERATURE: 97.1 F | BODY MASS INDEX: 40.02 KG/M2 | DIASTOLIC BLOOD PRESSURE: 96 MMHG | SYSTOLIC BLOOD PRESSURE: 138 MMHG

## 2024-01-09 DIAGNOSIS — R30.0 DYSURIA: Primary | ICD-10-CM

## 2024-01-09 DIAGNOSIS — K31.84 GASTROPARESIS: ICD-10-CM

## 2024-01-09 PROCEDURE — 99213 OFFICE O/P EST LOW 20 MIN: CPT | Performed by: NURSE PRACTITIONER

## 2024-01-09 RX ORDER — FLUTICASONE PROPIONATE 50 MCG
2 SPRAY, SUSPENSION (ML) NASAL DAILY
COMMUNITY

## 2024-01-09 RX ORDER — PHENAZOPYRIDINE HYDROCHLORIDE 200 MG/1
200 TABLET, FILM COATED ORAL 3 TIMES DAILY
Qty: 9 TABLET | Refills: 0 | Status: SHIPPED | OUTPATIENT
Start: 2024-01-09

## 2024-01-09 RX ORDER — AMITRIPTYLINE HYDROCHLORIDE 10 MG/1
TABLET, FILM COATED ORAL
COMMUNITY
Start: 2024-01-08

## 2024-01-09 RX ORDER — SODIUM CHLORIDE 9 MG/ML
250 INJECTION, SOLUTION INTRAVENOUS CONTINUOUS
Qty: 1000 ML | Refills: 0 | Status: SHIPPED | OUTPATIENT
Start: 2024-01-09

## 2024-01-09 RX ORDER — NITROFURANTOIN 25; 75 MG/1; MG/1
100 CAPSULE ORAL 2 TIMES DAILY
Qty: 14 CAPSULE | Refills: 0 | Status: SHIPPED | OUTPATIENT
Start: 2024-01-09 | End: 2024-01-16

## 2024-01-09 NOTE — PROGRESS NOTES
Subjective   Chief Complaint:  Dysuria    History of Present Illness:  This 23 y.o. female was seen in the office today.    The patient reports burning with urination last night, 01/07/2024. She reports difficulty holding fluid down due to a history of gastroparesis.    No Known Allergies   Current Outpatient Medications on File Prior to Visit   Medication Sig    amitriptyline (ELAVIL) 10 MG tablet     cetirizine (zyrTEC) 10 MG tablet Take 1 tablet by mouth Daily As Needed for Allergies.    EPINEPHrine (EPIPEN) 0.3 MG/0.3ML solution auto-injector injection     escitalopram (LEXAPRO) 10 MG tablet TAKE 1 TABLET BY MOUTH DAILY    fluticasone (FLONASE) 50 MCG/ACT nasal spray 2 sprays into the nostril(s) as directed by provider Daily.    omeprazole (priLOSEC) 40 MG capsule Take 1 capsule by mouth 2 (two) times a day.    ondansetron (ZOFRAN) 4 MG tablet Take 1 tablet by mouth.    spironolactone (ALDACTONE) 25 MG tablet TAKE ONE TABLET BY MOUTH TWICE A DAY    metoprolol succinate XL (TOPROL-XL) 25 MG 24 hr tablet Take 1 tablet by mouth Daily.    [DISCONTINUED] azithromycin (Zithromax Z-Abdiel) 250 MG tablet Take 2 tablets by mouth on day 1, then 1 tablet daily on days 2-5    [DISCONTINUED] methylPREDNISolone (MEDROL) 4 MG dose pack Take as directed on package instructions.     No current facility-administered medications on file prior to visit.      Past Medical, Surgical, Social, and Family History:  Past Medical History:   Diagnosis Date    Allergic     seasonal allergies    Gastroparesis     PCOS (polycystic ovarian syndrome)     PCOS (polycystic ovarian syndrome)     Vertigo     r/t to severe allergy symptoms     Past Surgical History:   Procedure Laterality Date    ENDOSCOPY N/A 11/14/2019    Normal exam    NO PAST SURGERIES       Social History     Socioeconomic History    Marital status: Single   Tobacco Use    Smoking status: Never    Smokeless tobacco: Never   Vaping Use    Vaping Use: Never used   Substance and  "Sexual Activity    Alcohol use: No    Drug use: No    Sexual activity: Never     Birth control/protection: OCP     Family History   Problem Relation Age of Onset    No Known Problems Father     No Known Problems Mother     No Known Problems Sister     Diabetes Paternal Grandmother     Breast cancer Maternal Grandmother     Breast cancer Maternal Great-Grandmother     Ovarian cancer Neg Hx     Colon cancer Neg Hx     Colon polyps Neg Hx        Prior Visit Notes/Records, Lab, Imaging, and Diagnostic Results Reviewed:  A1C:No results for input(s): \"HGBA1C\" in the last 51824 hours.  GLUCOSE:  Lab Results - Last 18 Months   Lab Units 03/14/23  1440 08/11/22  1214   GLUCOSE mg/dL CANCELED 82     LIPID:No results for input(s): \"CHLPL\", \"LDL\", \"HDL\", \"TRIG\" in the last 36955 hours.  PSA:No results for input(s): \"PSA\" in the last 39218 hours.  CBC:  Lab Results - Last 18 Months   Lab Units 03/14/23  1440 02/09/23  2035 08/11/22  1214   WBC x10E3/uL 8.3 7.2 8.28   HEMOGLOBIN g/dL 13.8 13.3 13.7   HEMATOCRIT % 41.2 39.8 42.6   PLATELETS x10E3/uL 346 332 328   IRON ug/dL 86  --   --       BMP/CMP:  Lab Results - Last 18 Months   Lab Units 03/14/23  1440 08/11/22  1214   SODIUM mmol/L 140 140   POTASSIUM mmol/L CANCELED 4.5   CHLORIDE mmol/L 99 101   CO2 mmol/L 23 27.0   GLUCOSE mg/dL CANCELED 82   BUN mg/dL 9 10   CREATININE mg/dL 0.83 0.86   EGFR RESULT mL/min/1.73 100 96.9   CALCIUM mg/dL 9.5 9.9     HEPATIC:  Lab Results - Last 18 Months   Lab Units 03/14/23  1440 08/11/22  1214   ALT (SGPT) IU/L 22 26   AST (SGOT) IU/L 26 28   ALK PHOS IU/L 113 129*     Vit D:  Lab Results - Last 18 Months   Lab Units 03/14/23  1440   VIT D 25 HYDROXY ng/mL 31.4     THYROID:No results for input(s): \"TSH\", \"FREET4\", \"FTI\" in the last 89391 hours.    Invalid input(s): \"FREET3\", \"T3\", \"T4\", \"TEUP\", \"TOTALT4\"  BMI Trend:  BMI Readings from Last 10 Encounters:   01/09/24 40.06 kg/m²   06/23/23 41.38 kg/m²   04/21/23 40.06 kg/m²   04/19/23 40.25 " "kg/m²   04/17/23 40.06 kg/m²   04/14/23 40.25 kg/m²   04/12/23 40.43 kg/m²   04/10/23 38.13 kg/m²   03/22/23 37.98 kg/m²   03/14/23 38.99 kg/m²     Objective   Vital Signs  /96 (BP Location: Left arm, Patient Position: Sitting, Cuff Size: Large Adult)   Pulse 105   Temp 97.1 °F (36.2 °C) (Infrared)   Resp 18   Ht 154.9 cm (61\")   Wt 96.2 kg (212 lb)   SpO2 97%   BMI 40.06 kg/m²   Physical Exam  Vitals reviewed.   Constitutional:       General: She is not in acute distress.     Appearance: Normal appearance.   Cardiovascular:      Rate and Rhythm: Normal rate and regular rhythm.   Pulmonary:      Effort: Pulmonary effort is normal.      Breath sounds: Normal breath sounds.   Abdominal:      Tenderness: There is no right CVA tenderness or left CVA tenderness.     Assessment & Plan   Diagnoses and all orders for this visit:    1. Dysuria (Primary)  -     phenazopyridine (Pyridium) 200 MG tablet; Take 1 tablet by mouth 3 (Three) Times a Day.  Dispense: 9 tablet; Refill: 0  -     UA / M With / Rflx Culture([facility] ONLY) - Urine, Clean Catch  -     nitrofurantoin, macrocrystal-monohydrate, (Macrobid) 100 MG capsule; Take 1 capsule by mouth 2 (Two) Times a Day for 7 days.  Dispense: 14 capsule; Refill: 0    2. Gastroparesis  -     sodium chloride 0.9 % solution; Infuse 250 mL/hr into a venous catheter Continuous.  Dispense: 1000 mL; Refill: 0    Discussions & Anticipatory Guidance:  Advised and educated plan of care.      The patient will Return for follow-up as needed pending results - we will call.    Advised antibiotics and Pyridium to follow. She will leave a urine sample today and I have ordered a bag of IV fluids for her at a local hospital outpatient department.    I have personally performed the services described in this document as transcribed by the above individual, and it is both accurate and complete.    Transcribed from ambient dictation for CHARLES Calvillo by Adis Cerna.  01/09/24 "   14:58 CST    Electronically signed by CHARLES Calvillo, 01/09/24, 2:58 PM CST.

## 2024-01-15 LAB
APPEARANCE UR: ABNORMAL
BACTERIA #/AREA URNS HPF: ABNORMAL /HPF
BACTERIA UR CULT: ABNORMAL
BACTERIA UR CULT: ABNORMAL
CASTS URNS QL MICRO: ABNORMAL /LPF
COLOR UR: YELLOW
EPI CELLS #/AREA URNS HPF: ABNORMAL /HPF (ref 0–10)
GLUCOSE UR QL STRIP: ABNORMAL
KETONES UR QL STRIP: ABNORMAL
MICRO URNS: ABNORMAL
OTHER ANTIBIOTIC SUSC ISLT: ABNORMAL
PH UR STRIP: ABNORMAL [PH]
PROT UR QL STRIP: ABNORMAL
RBC #/AREA URNS HPF: >30 /HPF (ref 0–2)
SP GR UR STRIP: ABNORMAL
URINALYSIS REFLEX: ABNORMAL
WBC #/AREA URNS HPF: ABNORMAL /HPF (ref 0–5)

## 2024-01-15 NOTE — PROGRESS NOTES
Reviewed [facility] - [facility].  If not seen in 3 days (3 day alert set), will send to pool to call the message.      Electronically signed by CHARLES Calvillo, 01/15/24, 10:43 AM CST.

## 2024-01-24 ENCOUNTER — OFFICE VISIT (OUTPATIENT)
Dept: FAMILY MEDICINE CLINIC | Facility: CLINIC | Age: 24
End: 2024-01-24
Payer: COMMERCIAL

## 2024-01-24 VITALS
OXYGEN SATURATION: 98 % | BODY MASS INDEX: 39.42 KG/M2 | HEART RATE: 82 BPM | TEMPERATURE: 96.9 F | WEIGHT: 208.8 LBS | SYSTOLIC BLOOD PRESSURE: 130 MMHG | DIASTOLIC BLOOD PRESSURE: 96 MMHG | HEIGHT: 61 IN | RESPIRATION RATE: 18 BRPM

## 2024-01-24 DIAGNOSIS — R30.0 DYSURIA: Primary | ICD-10-CM

## 2024-01-24 DIAGNOSIS — K31.84 GASTROPARESIS: ICD-10-CM

## 2024-01-24 PROCEDURE — 99213 OFFICE O/P EST LOW 20 MIN: CPT | Performed by: NURSE PRACTITIONER

## 2024-01-24 RX ORDER — NITROFURANTOIN 25; 75 MG/1; MG/1
100 CAPSULE ORAL 2 TIMES DAILY
Qty: 14 CAPSULE | Refills: 0 | Status: SHIPPED | OUTPATIENT
Start: 2024-01-24 | End: 2024-01-31

## 2024-01-24 RX ORDER — SODIUM CHLORIDE 9 MG/ML
250 INJECTION, SOLUTION INTRAVENOUS WEEKLY
Qty: 1000 ML | Refills: 2 | Status: SHIPPED | OUTPATIENT
Start: 2024-01-24

## 2024-01-24 RX ORDER — PHENAZOPYRIDINE HYDROCHLORIDE 200 MG/1
200 TABLET, FILM COATED ORAL 3 TIMES DAILY
Qty: 9 TABLET | Refills: 0 | Status: SHIPPED | OUTPATIENT
Start: 2024-01-24

## 2024-01-24 NOTE — PROGRESS NOTES
"Subjective   Chief Complaint:   \"I think I have another urinary tract infection.\"    History of Present Illness:  This 23 y.o. female was seen in the office today.    The patient presents today with UTI symptoms, burning, frequency, and dull low back pain. She has a history of gastroparesis and will be having a procedure on 02/09/2024 to help with this. She inquires if she can get another bag of IV fluids like we have done in the past.    No Known Allergies   Current Outpatient Medications on File Prior to Visit   Medication Sig    amitriptyline (ELAVIL) 10 MG tablet     cetirizine (zyrTEC) 10 MG tablet Take 1 tablet by mouth Daily As Needed for Allergies.    EPINEPHrine (EPIPEN) 0.3 MG/0.3ML solution auto-injector injection     escitalopram (LEXAPRO) 10 MG tablet TAKE 1 TABLET BY MOUTH DAILY    fluticasone (FLONASE) 50 MCG/ACT nasal spray 2 sprays into the nostril(s) as directed by provider Daily.    omeprazole (priLOSEC) 40 MG capsule Take 1 capsule by mouth 2 (two) times a day.    ondansetron (ZOFRAN) 4 MG tablet Take 1 tablet by mouth.    spironolactone (ALDACTONE) 25 MG tablet TAKE ONE TABLET BY MOUTH TWICE A DAY    [DISCONTINUED] phenazopyridine (Pyridium) 200 MG tablet Take 1 tablet by mouth 3 (Three) Times a Day.    [DISCONTINUED] sodium chloride 0.9 % solution Infuse 250 mL/hr into a venous catheter Continuous.    metoprolol succinate XL (TOPROL-XL) 25 MG 24 hr tablet Take 1 tablet by mouth Daily.     No current facility-administered medications on file prior to visit.      Past Medical, Surgical, Social, and Family History:  Past Medical History:   Diagnosis Date    Allergic     seasonal allergies    Gastroparesis     PCOS (polycystic ovarian syndrome)     PCOS (polycystic ovarian syndrome)     Vertigo     r/t to severe allergy symptoms     Past Surgical History:   Procedure Laterality Date    ENDOSCOPY N/A 11/14/2019    Normal exam    NO PAST SURGERIES       Social History     Socioeconomic History    " "Marital status: Single   Tobacco Use    Smoking status: Never    Smokeless tobacco: Never   Vaping Use    Vaping Use: Never used   Substance and Sexual Activity    Alcohol use: No    Drug use: No    Sexual activity: Never     Birth control/protection: OCP     Family History   Problem Relation Age of Onset    No Known Problems Father     No Known Problems Mother     No Known Problems Sister     Diabetes Paternal Grandmother     Breast cancer Maternal Grandmother     Breast cancer Maternal Great-Grandmother     Ovarian cancer Neg Hx     Colon cancer Neg Hx     Colon polyps Neg Hx        Prior Visit Notes/Records, Lab, Imaging, and Diagnostic Results Reviewed:  A1C:No results for input(s): \"HGBA1C\" in the last 54749 hours.  GLUCOSE:  Lab Results - Last 18 Months   Lab Units 03/14/23  1440 08/11/22  1214   GLUCOSE mg/dL CANCELED 82     LIPID:No results for input(s): \"CHLPL\", \"LDL\", \"HDL\", \"TRIG\" in the last 44598 hours.  PSA:No results for input(s): \"PSA\" in the last 00071 hours.  CBC:  Lab Results - Last 18 Months   Lab Units 03/14/23  1440 02/09/23  2035 08/11/22  1214   WBC x10E3/uL 8.3 7.2 8.28   HEMOGLOBIN g/dL 13.8 13.3 13.7   HEMATOCRIT % 41.2 39.8 42.6   PLATELETS x10E3/uL 346 332 328   IRON ug/dL 86  --   --       BMP/CMP:  Lab Results - Last 18 Months   Lab Units 03/14/23  1440 08/11/22  1214   SODIUM mmol/L 140 140   POTASSIUM mmol/L CANCELED 4.5   CHLORIDE mmol/L 99 101   CO2 mmol/L 23 27.0   GLUCOSE mg/dL CANCELED 82   BUN mg/dL 9 10   CREATININE mg/dL 0.83 0.86   EGFR RESULT mL/min/1.73 100 96.9   CALCIUM mg/dL 9.5 9.9     HEPATIC:  Lab Results - Last 18 Months   Lab Units 03/14/23  1440 08/11/22  1214   ALT (SGPT) IU/L 22 26   AST (SGOT) IU/L 26 28   ALK PHOS IU/L 113 129*     Vit D:  Lab Results - Last 18 Months   Lab Units 03/14/23  1440   VIT D 25 HYDROXY ng/mL 31.4     THYROID:No results for input(s): \"TSH\", \"FREET4\", \"FTI\" in the last 05587 hours.    Invalid input(s): \"FREET3\", \"T3\", \"T4\", \"TEUP\", " "\"TOTALT4\"  BMI Trend:  BMI Readings from Last 10 Encounters:   01/24/24 39.45 kg/m²   01/09/24 40.06 kg/m²   06/23/23 41.38 kg/m²   04/21/23 40.06 kg/m²   04/19/23 40.25 kg/m²   04/17/23 40.06 kg/m²   04/14/23 40.25 kg/m²   04/12/23 40.43 kg/m²   04/10/23 38.13 kg/m²   03/22/23 37.98 kg/m²     Objective   Vital Signs  /96 (BP Location: Left arm, Patient Position: Sitting, Cuff Size: Large Adult)   Pulse 82   Temp 96.9 °F (36.1 °C) (Infrared)   Resp 18   Ht 154.9 cm (61\")   Wt 94.7 kg (208 lb 12.8 oz)   SpO2 98%   BMI 39.45 kg/m²   Physical Exam  Vitals reviewed.   Constitutional:       General: She is not in acute distress.     Appearance: Normal appearance.   Cardiovascular:      Rate and Rhythm: Normal rate and regular rhythm.   Pulmonary:      Effort: Pulmonary effort is normal.      Breath sounds: Normal breath sounds.   Abdominal:      Tenderness: There is no right CVA tenderness or left CVA tenderness.     Assessment & Plan   Diagnoses and all orders for this visit:    1. Dysuria (Primary)  -     UA / M With / Rflx Culture([facility] ONLY) - Urine, Clean Catch    2. Gastroparesis  -     sodium chloride 0.9 % solution; Infuse 250 mL/hr into a venous catheter 1 (One) Time Per Week. Outpatient - one liter weekly x3 weeks for gastroparesis.  Dispense: 1000 mL; Refill: 2    Other orders  -     phenazopyridine (Pyridium) 200 MG tablet; Take 1 tablet by mouth 3 (Three) Times a Day.  Dispense: 9 tablet; Refill: 0  -     nitrofurantoin, macrocrystal-monohydrate, (Macrobid) 100 MG capsule; Take 1 capsule by mouth 2 (Two) Times a Day for 7 days.  Dispense: 14 capsule; Refill: 0    Discussions & Anticipatory Guidance:  Advised and educated plan of care.    The patient will Return for follow-up as needed pending results - we will call.  Prescription provided to get a liter of IV fluids weekly x3 weeks pending procedure. We will empirically treat with Macrobid and proceed with UA testing.    I have personally " performed the services described in this document as transcribed by the above individual, and it is both accurate and complete.    Transcribed from ambient dictation for CHARLES Calvillo by Gisel Baker.  01/24/24   10:23 CST    Electronically signed by CHARLES Gonsalez 01/24/24, 10:23 AM CST.

## 2024-01-25 ENCOUNTER — OFFICE VISIT (OUTPATIENT)
Dept: GASTROENTEROLOGY | Age: 24
End: 2024-01-25
Payer: COMMERCIAL

## 2024-01-25 VITALS
BODY MASS INDEX: 39.65 KG/M2 | WEIGHT: 210 LBS | DIASTOLIC BLOOD PRESSURE: 80 MMHG | OXYGEN SATURATION: 98 % | HEIGHT: 61 IN | HEART RATE: 73 BPM | SYSTOLIC BLOOD PRESSURE: 120 MMHG

## 2024-01-25 DIAGNOSIS — K31.84 GASTROPARESIS: Primary | ICD-10-CM

## 2024-01-25 DIAGNOSIS — K21.9 CHRONIC GERD: ICD-10-CM

## 2024-01-25 PROCEDURE — 99214 OFFICE O/P EST MOD 30 MIN: CPT | Performed by: NURSE PRACTITIONER

## 2024-01-25 RX ORDER — NITROFURANTOIN 25; 75 MG/1; MG/1
100 CAPSULE ORAL 2 TIMES DAILY
COMMUNITY
Start: 2024-01-24 | End: 2024-02-01

## 2024-01-25 ASSESSMENT — ENCOUNTER SYMPTOMS
DIARRHEA: 0
ABDOMINAL DISTENTION: 0
CONSTIPATION: 0
SHORTNESS OF BREATH: 0
VOMITING: 1
BLOOD IN STOOL: 0
ABDOMINAL PAIN: 0
COUGH: 0
TROUBLE SWALLOWING: 0
NAUSEA: 1
CHOKING: 0
ANAL BLEEDING: 0
RECTAL PAIN: 0

## 2024-01-25 NOTE — PROGRESS NOTES
Subjective:     Patient ID: Elena Swartz is a 23 y.o. female  PCP: Isrrael Burris MD  Referring Provider: No ref. provider found    HPI  Patient presents to the office today with the following complaints: Gastroesophageal Reflux      Pt seen today for follow up.  Hx gastroparesis, followed by [facility] clinic.  S/p pyloroplasty 7/24/2023.  Pt had EGD with NJ placed.  However, she was seen in ER with same day with obstruction of NJ tube.  This was pulled in ER.  She is scheduled for replacement next month, 2/9/2024.  If pt is able to tolerate the feedings, plan is for G or J tube.   She is able to keep some fluids down - 20-30 ounces.  She is able to tolerate some soft foods.  She reports weight loss.  She is using Zofran prn.         EGD 1/3/2024 - with NJ placement    Assessment:     1. Gastroparesis    2. Chronic GERD          Plan:   - Continue Omeprazole 40 mg BID   - Ok for Zofran prn   - Keep appt with Dr. Medina  - Follow up in 6 months or sooner if needed      Orders  No orders of the defined types were placed in this encounter.    Medications  No orders of the defined types were placed in this encounter.        Patient History:     Past Medical History:   Diagnosis Date    Gastroparesis     Near syncope     Palpitation     PCOS (polycystic ovarian syndrome)     Seasonal allergies     Vertigo     related to severe allergy syndrome        Past Surgical History:   Procedure Laterality Date    PYLOROPLASTY  07/24/2023    Laparoscopic Pyloroplasty  Dr [facility]    SINUS ENDOSCOPY  2019    UPPER GASTROINTESTINAL ENDOSCOPY  01/08/2021    Jt Almeida MD-Gastritis, no h pylori    UPPER GASTROINTESTINAL ENDOSCOPY  11/14/2019    Dr Wolf-No celiac    UPPER GASTROINTESTINAL ENDOSCOPY N/A 02/28/2023    Dr ELISA Robles-Gastritis, no sprue or h pylori       Family History   Problem Relation Age of Onset    Heart Disease Father     Atrial Fibrillation Father     Colon Cancer Neg Hx

## 2024-01-26 LAB
APPEARANCE UR: CLEAR
BACTERIA #/AREA URNS HPF: NORMAL /HPF
BACTERIA UR CULT: NORMAL
BACTERIA UR CULT: NORMAL
CASTS URNS QL MICRO: NORMAL /LPF
COLOR UR: NORMAL
EPI CELLS #/AREA URNS HPF: NORMAL /HPF (ref 0–10)
GLUCOSE UR QL STRIP: NORMAL
KETONES UR QL STRIP: NORMAL
MICRO URNS: NORMAL
PH UR STRIP: NORMAL [PH]
PROT UR QL STRIP: NORMAL
RBC #/AREA URNS HPF: NORMAL /HPF (ref 0–2)
SP GR UR STRIP: NORMAL
URINALYSIS REFLEX: NORMAL
WBC #/AREA URNS HPF: NORMAL /HPF (ref 0–5)

## 2024-01-26 NOTE — PROGRESS NOTES
Reviewed results - [employer] message sent.  If not seen in 3 days (3 day alert set), will send to pool to call the message.      Electronically signed by CHARLES Calvillo, 01/26/24, 7:16 AM CST.

## 2024-02-20 ENCOUNTER — PATIENT MESSAGE (OUTPATIENT)
Dept: GASTROENTEROLOGY | Age: 24
End: 2024-02-20

## 2024-02-21 ENCOUNTER — TELEPHONE (OUTPATIENT)
Dept: GASTROENTEROLOGY | Age: 24
End: 2024-02-21

## 2024-02-21 NOTE — TELEPHONE ENCOUNTER
2.21.24  Called Dr. Medina, Gastro in [place], [place] at [phone] to get last note and last operation note for her NJ tube placement. Spoke with [facility] and she said that the office is closed already but she will send a message to them and they should fax everything tomorrow.

## 2024-02-25 ENCOUNTER — PATIENT MESSAGE (OUTPATIENT)
Dept: FAMILY MEDICINE CLINIC | Facility: CLINIC | Age: 24
End: 2024-02-25
Payer: COMMERCIAL

## 2024-02-26 RX ORDER — AMOXICILLIN AND CLAVULANATE POTASSIUM 875; 125 MG/1; MG/1
1 TABLET, FILM COATED ORAL 2 TIMES DAILY
Qty: 20 TABLET | Refills: 0 | Status: SHIPPED | OUTPATIENT
Start: 2024-02-26 | End: 2024-02-28

## 2024-02-28 RX ORDER — CEFDINIR 250 MG/5ML
300 POWDER, FOR SUSPENSION ORAL 2 TIMES DAILY
Qty: 120 ML | Refills: 0 | Status: SHIPPED | OUTPATIENT
Start: 2024-02-28 | End: 2024-03-09

## 2024-04-01 ENCOUNTER — TELEPHONE (OUTPATIENT)
Dept: GENETICS | Facility: HOSPITAL | Age: 24
End: 2024-04-01
Payer: COMMERCIAL

## 2024-04-01 NOTE — TELEPHONE ENCOUNTER
Spoke with patient and disclosed negative genetic results. Informed patient these results would be on Pulmocidet and sent to her Dr. Patient requested a copy be mailed to her.

## 2024-04-02 ENCOUNTER — DOCUMENTATION (OUTPATIENT)
Dept: GENETICS | Facility: HOSPITAL | Age: 24
End: 2024-04-02
Payer: COMMERCIAL

## 2024-04-02 NOTE — PROGRESS NOTES
Wendy Newby, a 23-year-old female, was referred for genetic counseling due to a family history of breast cancer. Genetic counseling was performed via telephone. Ms. Newby confirmed her full name, date of birth, and that she was physically located in the [facility] at the time of the appointment. Ms. Newby has no personal history of cancer. She was 12 at menarche. She is pre-menopausal and retains her uterus and ovaries. She has never had a mammogram or a colonoscopy. She was interested in discussing her risk for a hereditary cancer syndrome. Ms. Newby decided to pursue comprehensive genetic testing to evaluate her risk of cancer, therefore the CancerNext Panel was ordered through [employer] which analyzes 36 genes associated with an increased cancer risk. Genetic testing was negative for known pathogenic mutations in the 34 genes on this panel. These normal results were discussed with Ms. Newby via telephone on 4/1/2024.     PERTINENT FAMILY HISTORY: (See attached pedigree)   Mat. Grandmother:   Breast cancer, 81  Mat. Grandfather:   Prostate cancer, 80  Mat. Great-Uncle:   Multiple myeloma  Mat. Great-Grandmother:  Breast cancer, 80s  Pat. Grandfather:   Melanoma, 80s    We do not have medical records regarding any of these diagnoses.     RISK ASSESSMENT:  Ms. Newby's family history of breast cancer raises the question of a hereditary cancer syndrome.  NCCN guidelines for genetic testing for BRCA1/2 states that individuals with three or more close blood relatives diagnosed with breast cancer at any age may consider genetic testing. Based on Ms. Newby's maternal family history, she would not meet this criteria. Despite this, we discussed how genetic testing is still available to her. This risk assessment is based on the family history information provided at the time of the appointment.  The assessment could change in the future should new information be obtained.    At this time, Ms.  Keyonna's management should be guided by a family history-based risk assessment. Referlyer-WhatsAppzick, version 8 is able to take into account personal factors (age at menarche, age at first live birth, breast density, etc) and family history when calculating risk for breast cancer.  Computer modeling estimates that Ms. Wallss lifetime personal risk for developing breast cancer is up to 16.5% (Efrem-Poornimack, v8), compared to the average 26 y.o. female's risk of 11.2%. In general, a lifetime risk above 20% is considered to be “high risk” where increased screening is warranted; Ms. Orozco risk does not fall into that category. This risk assessment is based on the family history information provided at the time of the appointment and could change in the future should new information be obtained.    GENETIC COUNSELING (30 minutes):  We reviewed the family history information in detail. Cases of cancer follow three general patterns: sporadic, familial, and hereditary.  While most cancer is sporadic, some cases appear to occur in family clusters.  These cases are said to be familial and account for 10-20% of cancer cases.  Familial cases may be due to a combination of shared genes and environmental factors among family members.  In even fewer cases, the risk for cancer is inherited, and the genes responsible for the increased cancer risk are known.       Family histories typical of hereditary cancer syndromes usually include multiple first- and second-degree relatives diagnosed with cancer types that define a syndrome.  These cases tend to be diagnosed at younger-than-expected ages and can be bilateral or multifocal.  The cancer in these families follows an autosomal dominant inheritance pattern, which indicates the likely presence of a mutation in a cancer susceptibility gene.  Children and siblings of an individual believed to carry this mutation have a 50% chance of inheriting that mutation, thereby inheriting the  increased risk to develop cancer.  These mutations can be passed down from the maternal or the paternal lineage.     Due to Ms. Newby's family history of breast cancer, we discussed hereditary breast cancer. Hereditary breast cancer accounts for 5-10% of all cases of breast cancer.  A significant proportion of hereditary breast cancer can be attributed to mutations in the BRCA1 and BRCA2 genes.  Mutations in these genes confer an increased risk for breast cancer, ovarian cancer, male breast cancer, prostate cancer, and pancreatic cancer.  Women with a BRCA1 or BRCA2 mutation have up to an 87% lifetime risk of breast cancer and up to a 44% risk of ovarian cancer.  There are other clinically significant breast cancer related genes in addition to BRCA1/2.      There are other genes that are known to be associated with an increased risk for cancer.  Some of these genes have well defined cancer risks and established management guidelines.  Other genes that can be tested for have been more recently described, and there may be less data regarding the risks and therefore may not have established management guidelines. We discussed these limitations at length.  Based on Ms. Newby's desire to get as much information as possible regarding her personal risks and potential risks for her family, she opted to pursue testing through a panel that would look at several other genes known to increase the risk for cancer.      GENETIC TESTING:  The risks, benefits, and limitations of genetic testing and implications for clinical management following testing were reviewed.  DNA test results can influence decisions regarding screening and prevention.  Genetic testing can have significant psychological implications for both individuals and families. Also discussed was the possibility of employment and insurance discrimination based on genetic test results and the laws in place to prevent this, as well as the limitations of these laws.        We discussed panel testing, which would involve testing 34 genes associated with increased cancer risk. The implications of a positive or negative test result were discussed.  We also discussed the importance of testing an affected relative and how a negative result for Ms. Newby wouldn't necessarily mean the cancers presenting in her family weren't due to a genetic mutation that Ms. Newby did not inherit.  In general, a negative genetic test result is most informative if a mutation has first been established in an affected member of the family.  In cases where an affected individual is not available or interested in testing, it is appropriate to offer testing to an unaffected individual.     We discussed the possibility that, in some cases, genetic test results may be ambiguous due to the identification of a genetic variant of uncertain significance (VUS). These variants may or may not be associated with an increased cancer risk. With multigene panel testing, it is not uncommon for a VUS to be identified.  If a VUS is identified, testing family members is typically not recommended and screening recommendations are made based on the family history.  The laboratories that perform genetic testing work to reclassify the VUS and send out an amended report if and when a VUS is reclassified.  The majority of variant findings are ultimately reclassified to a negative result. Given Ms. Newby's family history, a negative test result does not eliminate all cancer risk, although the risk may not be as high as it would with positive genetic testing.     TEST RESULTS:  Genetic testing was negative for known pathogenic mutations by sequencing and rearrangement testing for the 34 genes included on the CancerNext panel.   This negative result greatly lowers but does not eliminate the risk of a hereditary cancer syndrome for Ms. Newby. This assessment is based on the information provided at the time of consultation and  could change should new information be obtained regarding his personal and/or family history.     CANCER PREVENTION:  Options available to individuals with an elevated lifetime risk for breast and/or ovarian cancer were briefly discussed.  Based on computer modeling, Ms. Newby's lifetime risk for breast cancer would not be considered “high risk” (>20%).   Per NCCN guidelines, it is appropriate for her to follow general population screening guidelines for her breast cancer risk including annual clinical breast exam and annual mammography. This assessment is based on the information provided at the time of the consultation and could change should new information become available regarding the family history or if Ms. Newby were to test positive for a mutation in a cancer susceptibility gene.    PLAN:  Genetic counseling remains available to Ms. Newby and her family.  Ms. Newby is welcome to contact us with any questions in the future at [phone].     Betty Ko, MS, [facility], [facility]  Licensed Certified Genetic Counselor    Cc: Joceline Wisdom APRN

## 2024-05-14 RX ORDER — ESCITALOPRAM OXALATE 10 MG/1
10 TABLET ORAL DAILY
Qty: 30 TABLET | Refills: 5 | Status: SHIPPED | OUTPATIENT
Start: 2024-05-14

## 2024-05-27 ENCOUNTER — OFFICE VISIT (OUTPATIENT)
Age: 24
End: 2024-05-27
Payer: MEDICAID

## 2024-05-27 VITALS
DIASTOLIC BLOOD PRESSURE: 76 MMHG | BODY MASS INDEX: 40.4 KG/M2 | HEIGHT: 61 IN | WEIGHT: 214 LBS | TEMPERATURE: 97.6 F | HEART RATE: 96 BPM | RESPIRATION RATE: 20 BRPM | SYSTOLIC BLOOD PRESSURE: 118 MMHG | OXYGEN SATURATION: 97 %

## 2024-05-27 DIAGNOSIS — R53.83 OTHER FATIGUE: ICD-10-CM

## 2024-05-27 DIAGNOSIS — S30.861A TICK BITE OF ABDOMEN, INITIAL ENCOUNTER: Primary | ICD-10-CM
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
## 2024-05-27 PROCEDURE — 99213 OFFICE O/P EST LOW 20 MIN: CPT | Performed by: NURSE PRACTITIONER

## 2024-05-27 RX ORDER — DOXYCYCLINE HYCLATE 100 MG
100 TABLET ORAL 2 TIMES DAILY
Qty: 20 TABLET | Refills: 0 | Status: SHIPPED | OUTPATIENT
Start: 2024-05-27 | End: 2024-06-06

## 2024-05-27 ASSESSMENT — ENCOUNTER SYMPTOMS
CHEST TIGHTNESS: 0
EYE DISCHARGE: 0
ABDOMINAL PAIN: 0
ABDOMINAL DISTENTION: 0
SORE THROAT: 0
SINUS PRESSURE: 0
EYE PAIN: 0
COLOR CHANGE: 0
SHORTNESS OF BREATH: 0
COUGH: 0
TROUBLE SWALLOWING: 0
WHEEZING: 0
STRIDOR: 0

## 2024-05-27 NOTE — PROGRESS NOTES
[facility]  [facility]  [address] [place] [address] DRIVE  [facility] [address]  Dept: [phone]  Dept Fax: [phone]  Loc: [phone]    Elena Swartz is a 24 y.o. female who presents today for her medical conditions/complaints as noted below.  Elena Swartz is complaining of Tick Removal (Ring around possible tick bite on stomach, bite 3 days ago), Headache, Joint Pain, and Fatigue        HPI:   Headache  Joint Pain   Pertinent negatives include no fever or numbness.   Fatigue  Associated symptoms include arthralgias, fatigue and headaches. Pertinent negatives include no abdominal pain, chest pain, chills, congestion, coughing, fever, neck pain, numbness, rash, sore throat or weakness.       Elena presents to the office complaining of a tick bite to her right lower abdomen, mild joint pain, and fatigue.  Symptoms started 3 days ago.  Denies fever and GI upset.  Denies recent abx.     Past Medical History:   Diagnosis Date    Gastroparesis     Near syncope     Palpitation     PCOS (polycystic ovarian syndrome)     Seasonal allergies     Vertigo     related to severe allergy syndrome        Past Surgical History:   Procedure Laterality Date    GASTROSTOMY TUBE PLACEMENT  05/08/2024    with duodenum placement    PYLOROPLASTY  07/24/2023    Laparoscopic Pyloroplasty  Dr [facility]    SINUS ENDOSCOPY  2019    UPPER GASTROINTESTINAL ENDOSCOPY  01/08/2021    Jt Almeida MD-Gastritis, no h pylori    UPPER GASTROINTESTINAL ENDOSCOPY  11/14/2019    Dr Wolf-No celiac    UPPER GASTROINTESTINAL ENDOSCOPY N/A 02/28/2023    Dr ELISA Robles-Gastritis, no sprue or h pylori    UPPER GASTROINTESTINAL ENDOSCOPY  02/09/2024    Dr Medina [facility] [place] placement       Family History   Problem Relation Age of Onset    Heart Disease Father     Atrial Fibrillation Father     Colon Cancer Neg Hx     Colon Polyps Neg Hx        Social History     Tobacco Use

## 2024-05-27 NOTE — PATIENT INSTRUCTIONS
Doxy sent  Avoid scratching area  Follow-up with PCP as needed  Go to ER for worrisome symptoms    Patient verbalized understanding and agrees to plan.

## 2024-09-10 ENCOUNTER — OFFICE VISIT (OUTPATIENT)
Dept: INTERNAL MEDICINE | Age: 24
End: 2024-09-10

## 2024-09-10 VITALS
DIASTOLIC BLOOD PRESSURE: 84 MMHG | HEART RATE: 102 BPM | BODY MASS INDEX: 41.8 KG/M2 | HEIGHT: 61 IN | OXYGEN SATURATION: 98 % | SYSTOLIC BLOOD PRESSURE: 120 MMHG | WEIGHT: 221.4 LBS

## 2024-09-10 DIAGNOSIS — R00.0 TACHYCARDIA: ICD-10-CM

## 2024-09-10 DIAGNOSIS — E53.8 B12 DEFICIENCY: ICD-10-CM

## 2024-09-10 DIAGNOSIS — R55 PRE-SYNCOPE: ICD-10-CM

## 2024-09-10 DIAGNOSIS — Z00.00 HEALTH CARE MAINTENANCE: Primary | ICD-10-CM

## 2024-09-10 DIAGNOSIS — Z91.09 ENVIRONMENTAL ALLERGIES: ICD-10-CM

## 2024-09-10 DIAGNOSIS — I10 PRIMARY HYPERTENSION: ICD-10-CM

## 2024-09-10 DIAGNOSIS — K31.84 GASTROPARESIS: ICD-10-CM

## 2024-09-10 DIAGNOSIS — K21.9 GASTROESOPHAGEAL REFLUX DISEASE WITHOUT ESOPHAGITIS: ICD-10-CM

## 2024-09-10 DIAGNOSIS — E55.9 VITAMIN D DEFICIENCY: ICD-10-CM

## 2024-09-10 SDOH — ECONOMIC STABILITY: FOOD INSECURITY: WITHIN THE PAST 12 MONTHS, THE FOOD YOU BOUGHT JUST DIDN'T LAST AND YOU DIDN'T HAVE MONEY TO GET MORE.: NEVER TRUE

## 2024-09-10 SDOH — ECONOMIC STABILITY: FOOD INSECURITY: WITHIN THE PAST 12 MONTHS, YOU WORRIED THAT YOUR FOOD WOULD RUN OUT BEFORE YOU GOT MONEY TO BUY MORE.: NEVER TRUE

## 2024-09-10 SDOH — ECONOMIC STABILITY: INCOME INSECURITY: HOW HARD IS IT FOR YOU TO PAY FOR THE VERY BASICS LIKE FOOD, HOUSING, MEDICAL CARE, AND HEATING?: NOT HARD AT ALL

## 2024-09-10 SDOH — HEALTH STABILITY: PHYSICAL HEALTH: ON AVERAGE, HOW MANY MINUTES DO YOU ENGAGE IN EXERCISE AT THIS LEVEL?: 40 MIN

## 2024-09-10 SDOH — HEALTH STABILITY: PHYSICAL HEALTH: ON AVERAGE, HOW MANY DAYS PER WEEK DO YOU ENGAGE IN MODERATE TO STRENUOUS EXERCISE (LIKE A BRISK WALK)?: 5 DAYS

## 2024-09-10 ASSESSMENT — ENCOUNTER SYMPTOMS
RHINORRHEA: 0
VOMITING: 0
COLOR CHANGE: 0
FACIAL SWELLING: 0
WHEEZING: 0
COUGH: 0
EYE DISCHARGE: 0
BACK PAIN: 0
CHOKING: 0
VOICE CHANGE: 0
TROUBLE SWALLOWING: 0
SORE THROAT: 0
ABDOMINAL PAIN: 1
EYE PAIN: 0
CHEST TIGHTNESS: 0
SINUS PRESSURE: 0
ANAL BLEEDING: 0
EYE REDNESS: 0
PHOTOPHOBIA: 0
RECTAL PAIN: 0
EYE ITCHING: 0
ABDOMINAL DISTENTION: 1
CONSTIPATION: 1
NAUSEA: 1
SHORTNESS OF BREATH: 0
BLOOD IN STOOL: 0
DIARRHEA: 1

## 2024-09-10 ASSESSMENT — PATIENT HEALTH QUESTIONNAIRE - PHQ9
SUM OF ALL RESPONSES TO PHQ9 QUESTIONS 1 & 2: 0
SUM OF ALL RESPONSES TO PHQ QUESTIONS 1-9: 0
SUM OF ALL RESPONSES TO PHQ QUESTIONS 1-9: 0
2. FEELING DOWN, DEPRESSED OR HOPELESS: NOT AT ALL
SUM OF ALL RESPONSES TO PHQ QUESTIONS 1-9: 0
SUM OF ALL RESPONSES TO PHQ QUESTIONS 1-9: 0
1. LITTLE INTEREST OR PLEASURE IN DOING THINGS: NOT AT ALL

## 2024-09-26 ENCOUNTER — TELEPHONE (OUTPATIENT)
Dept: CARDIOLOGY CLINIC | Age: 24
End: 2024-09-26

## 2024-10-07 DIAGNOSIS — E53.8 B12 DEFICIENCY: ICD-10-CM

## 2024-10-07 DIAGNOSIS — E55.9 VITAMIN D DEFICIENCY: ICD-10-CM

## 2024-10-07 DIAGNOSIS — R00.0 TACHYCARDIA: ICD-10-CM

## 2024-10-07 DIAGNOSIS — E55.9 VITAMIN D DEFICIENCY: Primary | ICD-10-CM

## 2024-10-07 LAB
25(OH)D3 SERPL-MCNC: 25.6 NG/ML
ALBUMIN SERPL-MCNC: 4.2 G/DL (ref 3.5–5.2)
ALP SERPL-CCNC: 108 U/L (ref 35–104)
ALT SERPL-CCNC: 22 U/L (ref 5–33)
ANION GAP SERPL CALCULATED.3IONS-SCNC: 11 MMOL/L (ref 7–19)
AST SERPL-CCNC: 24 U/L (ref 5–32)
BASOPHILS # BLD: 0 K/UL (ref 0–0.2)
BASOPHILS NFR BLD: 0.5 % (ref 0–1)
BILIRUB SERPL-MCNC: 0.4 MG/DL (ref 0.2–1.2)
BUN SERPL-MCNC: 10 MG/DL (ref 6–20)
CALCIUM SERPL-MCNC: 9.2 MG/DL (ref 8.6–10)
CHLORIDE SERPL-SCNC: 103 MMOL/L (ref 98–111)
CHOLEST SERPL-MCNC: 150 MG/DL (ref 0–199)
CO2 SERPL-SCNC: 26 MMOL/L (ref 22–29)
CREAT SERPL-MCNC: 0.8 MG/DL (ref 0.5–0.9)
EOSINOPHIL # BLD: 0.1 K/UL (ref 0–0.6)
EOSINOPHIL NFR BLD: 1.8 % (ref 0–5)
ERYTHROCYTE [DISTWIDTH] IN BLOOD BY AUTOMATED COUNT: 14.1 % (ref 11.5–14.5)
GLUCOSE SERPL-MCNC: 88 MG/DL (ref 70–99)
HBA1C MFR BLD: 5.5 % (ref 4–5.6)
HCT VFR BLD AUTO: 36 % (ref 37–47)
HDLC SERPL-MCNC: 42 MG/DL (ref 40–60)
HGB BLD-MCNC: 10.8 G/DL (ref 12–16)
IMM GRANULOCYTES # BLD: 0 K/UL
LDLC SERPL CALC-MCNC: 82 MG/DL
LYMPHOCYTES # BLD: 1.1 K/UL (ref 1.1–4.5)
LYMPHOCYTES NFR BLD: 20.2 % (ref 20–40)
MCH RBC QN AUTO: 23.2 PG (ref 27–31)
MCHC RBC AUTO-ENTMCNC: 30 G/DL (ref 33–37)
MCV RBC AUTO: 77.3 FL (ref 81–99)
MONOCYTES # BLD: 0.5 K/UL (ref 0–0.9)
MONOCYTES NFR BLD: 8.5 % (ref 0–10)
NEUTROPHILS # BLD: 3.8 K/UL (ref 1.5–7.5)
NEUTS SEG NFR BLD: 68.6 % (ref 50–65)
PLATELET # BLD AUTO: 345 K/UL (ref 130–400)
PMV BLD AUTO: 9.9 FL (ref 9.4–12.3)
POTASSIUM SERPL-SCNC: 4.2 MMOL/L (ref 3.5–5)
PROT SERPL-MCNC: 7.1 G/DL (ref 6.4–8.3)
RBC # BLD AUTO: 4.66 M/UL (ref 4.2–5.4)
SODIUM SERPL-SCNC: 140 MMOL/L (ref 136–145)
TRIGL SERPL-MCNC: 129 MG/DL (ref 0–149)
TSH SERPL DL<=0.005 MIU/L-ACNC: 3.19 UIU/ML (ref 0.27–4.2)
VIT B12 SERPL-MCNC: 794 PG/ML (ref 232–1245)
WBC # BLD AUTO: 5.5 K/UL (ref 4.8–10.8)

## 2024-10-07 RX ORDER — ERGOCALCIFEROL 1.25 MG/1
50000 CAPSULE, LIQUID FILLED ORAL WEEKLY
Qty: 12 CAPSULE | Refills: 1 | Status: SHIPPED | OUTPATIENT
Start: 2024-10-07

## 2024-10-09 ENCOUNTER — OFFICE VISIT (OUTPATIENT)
Dept: CARDIOLOGY CLINIC | Age: 24
End: 2024-10-09
Payer: MEDICAID

## 2024-10-09 VITALS
HEIGHT: 61 IN | DIASTOLIC BLOOD PRESSURE: 82 MMHG | WEIGHT: 222 LBS | HEART RATE: 72 BPM | SYSTOLIC BLOOD PRESSURE: 118 MMHG | BODY MASS INDEX: 41.91 KG/M2

## 2024-10-09 DIAGNOSIS — R00.0 TACHYCARDIA: Primary | ICD-10-CM

## 2024-10-09 DIAGNOSIS — R42 ORTHOSTATIC DIZZINESS: ICD-10-CM

## 2024-10-09 PROCEDURE — 99213 OFFICE O/P EST LOW 20 MIN: CPT | Performed by: CLINICAL NURSE SPECIALIST

## 2024-10-09 PROCEDURE — 93000 ELECTROCARDIOGRAM COMPLETE: CPT | Performed by: CLINICAL NURSE SPECIALIST

## 2024-10-09 RX ORDER — METOPROLOL SUCCINATE 25 MG/1
25 TABLET, EXTENDED RELEASE ORAL DAILY
Qty: 30 TABLET | Refills: 11 | Status: SHIPPED | OUTPATIENT
Start: 2024-10-09

## 2024-10-09 NOTE — PROGRESS NOTES
[facility]  [address] [address]  Phone: [phone]  Fax: [phone]    OFFICE VISIT:  10/9/2024    Elena Swartz - : 2000    Reason For Visit:  Elena is a 24 y.o. female who is here for Follow-up (NTP     pt has had tachycardia) and Tachycardia  History of dizziness due to orthostasis.  Previous negative tilt table for POTS.  Previous palpitations and PVCs noted on cardiac monitor.  Has PCOS on spironolactone obesity last seen in the office in   Has gastroparesis with GJ tube in place for nutrition.  Previous complex autoimmune workup, genetic workup and endocrinology workup  Had workup at [facility] for presyncope tachycardic and was thought to have COVID-19 syndrome    She is here today to reestablish care.  She states she is doing well on low-dose Toprol.  Heart rate is well-controlled.  Occasional PVCs.      Subjective  Elena denies exertional chest pain, shortness of breath, orthopnea, paroxysmal nocturnal dyspnea, syncope, presyncope, arrhythmia, edema and fatigue.  The patient denies numbness or weakness to suggest cerebrovascular accident or transient ischemic attack.    Janet Breen MD is PCP and follows labs .  Also follows with [facility] for gastroparesis  Elena Swartz has the following history as recorded in [facility]:    Patient Active Problem List    Diagnosis Date Noted    Palpitations 2021     Past Medical History:   Diagnosis Date    ADHD (attention deficit hyperactivity disorder)     Anxiety     Gastroparesis     Gastroparesis     Pt has a feeding tube    Near syncope     Palpitation     PCOS (polycystic ovarian syndrome)     Seasonal allergies     Vertigo     related to severe allergy syndrome      Past Surgical History:   Procedure Laterality Date    GASTROSTOMY TUBE PLACEMENT  2024    with duodenum placement    PYLOROPLASTY  2023    Laparoscopic Pyloroplasty  Dr [facility]    PYLOROPLASTY

## 2024-10-09 NOTE — PATIENT INSTRUCTIONS
Maintain good blood pressure control-goal<130/80 at rest  Maintain good cholesterol control LDL goal<70 with arterial disease  If you are diabetic work to keep/obtain hemoglobin A1c< 7    Follow up in 1 year  With Dr. Fearon   Call with any questions or concerns  Follow up with Janet Breen MD for non cardiac problems  Report any new problems  Cardiovascular Fitness-Exercise as tolerated.  Strive for 30 minutes of exercise most days of the week.    Cardiac / Healthy Diet- Avoid processed high fat foods, maintain low sodium/salt   Continue current medications as directed  Continue plan of treatment  It is always recommended that you bring your medications bottles with you to each visit - this is for your safety!

## 2024-12-30 DIAGNOSIS — E55.9 VITAMIN D DEFICIENCY: ICD-10-CM

## 2024-12-30 RX ORDER — ERGOCALCIFEROL 1.25 MG/1
50000 CAPSULE, LIQUID FILLED ORAL WEEKLY
Qty: 12 CAPSULE | Refills: 1 | OUTPATIENT
Start: 2024-12-30

## 2024-12-31 ENCOUNTER — OFFICE VISIT (OUTPATIENT)
Dept: INTERNAL MEDICINE | Age: 24
End: 2024-12-31
Payer: MEDICAID

## 2024-12-31 VITALS
BODY MASS INDEX: 42.86 KG/M2 | WEIGHT: 227 LBS | HEIGHT: 61 IN | HEART RATE: 95 BPM | DIASTOLIC BLOOD PRESSURE: 80 MMHG | OXYGEN SATURATION: 99 % | SYSTOLIC BLOOD PRESSURE: 125 MMHG

## 2024-12-31 DIAGNOSIS — L30.9 DERMATITIS: Primary | ICD-10-CM

## 2024-12-31 DIAGNOSIS — F32.89 OTHER DEPRESSION: ICD-10-CM

## 2024-12-31 PROCEDURE — 99213 OFFICE O/P EST LOW 20 MIN: CPT | Performed by: INTERNAL MEDICINE

## 2024-12-31 RX ORDER — NYSTATIN AND TRIAMCINOLONE ACETONIDE 100000; 1 [USP'U]/G; MG/G
CREAM TOPICAL
Qty: 30 G | Refills: 1 | Status: SHIPPED | OUTPATIENT
Start: 2024-12-31

## 2024-12-31 RX ORDER — ESCITALOPRAM OXALATE 20 MG/1
20 TABLET ORAL DAILY
Qty: 90 TABLET | Refills: 1 | Status: SHIPPED | OUTPATIENT
Start: 2024-12-31

## 2024-12-31 SDOH — ECONOMIC STABILITY: FOOD INSECURITY: WITHIN THE PAST 12 MONTHS, YOU WORRIED THAT YOUR FOOD WOULD RUN OUT BEFORE YOU GOT MONEY TO BUY MORE.: NEVER TRUE

## 2024-12-31 SDOH — ECONOMIC STABILITY: INCOME INSECURITY: HOW HARD IS IT FOR YOU TO PAY FOR THE VERY BASICS LIKE FOOD, HOUSING, MEDICAL CARE, AND HEATING?: NOT HARD AT ALL

## 2024-12-31 ASSESSMENT — ENCOUNTER SYMPTOMS
RHINORRHEA: 0
FACIAL SWELLING: 0
ABDOMINAL PAIN: 1
EYE ITCHING: 0
EYE REDNESS: 0
SORE THROAT: 0
PHOTOPHOBIA: 0
BLOOD IN STOOL: 0
SHORTNESS OF BREATH: 0
CHEST TIGHTNESS: 0
COUGH: 0
RECTAL PAIN: 0
COLOR CHANGE: 0
TROUBLE SWALLOWING: 0
CHOKING: 0
WHEEZING: 0
VOICE CHANGE: 0
EYE DISCHARGE: 0
CONSTIPATION: 1
VOMITING: 0
BACK PAIN: 0
ANAL BLEEDING: 0
DIARRHEA: 1
SINUS PRESSURE: 0
NAUSEA: 1
EYE PAIN: 0
ABDOMINAL DISTENTION: 1

## 2024-12-31 ASSESSMENT — PATIENT HEALTH QUESTIONNAIRE - PHQ9
2. FEELING DOWN, DEPRESSED OR HOPELESS: NOT AT ALL
SUM OF ALL RESPONSES TO PHQ9 QUESTIONS 1 & 2: 0
SUM OF ALL RESPONSES TO PHQ QUESTIONS 1-9: 0
1. LITTLE INTEREST OR PLEASURE IN DOING THINGS: NOT AT ALL
SUM OF ALL RESPONSES TO PHQ QUESTIONS 1-9: 0

## 2024-12-31 NOTE — PROGRESS NOTES
Chief Complaint   Patient presents with    Other     Red itchy armpits going on for a couple months        HPI: Elena Swartz is a 24 y.o. female is here for evaluation of a 2-month history of itchy armpits.  She initially thought her deodorant was causing a problem.  However, she changed her deodorant.  That did not alleviate the symptoms.  She even changed detergents and body cleansing products.  She also feels like she needs an increase in her Lexapro.    Past Medical History:   Diagnosis Date    ADHD (attention deficit hyperactivity disorder)     Anxiety     Gastroparesis     Gastroparesis     Pt has a feeding tube    Near syncope     Palpitation     PCOS (polycystic ovarian syndrome)     Seasonal allergies     Vertigo     related to severe allergy syndrome       Past Surgical History:   Procedure Laterality Date    GASTROSTOMY TUBE PLACEMENT  05/08/2024    with duodenum placement    PYLOROPLASTY  07/24/2023    Laparoscopic Pyloroplasty  Dr [facility]    PYLOROPLASTY  07/23/2023    SINUS ENDOSCOPY  2019    UPPER GASTROINTESTINAL ENDOSCOPY  01/08/2021    Jt Almeida MD-Gastritis, no h pylori    UPPER GASTROINTESTINAL ENDOSCOPY  11/14/2019    Dr Wolf-No celiac    UPPER GASTROINTESTINAL ENDOSCOPY N/A 02/28/2023    Dr ELISA Robles-Gastritis, no sprue or h pylori    UPPER GASTROINTESTINAL ENDOSCOPY  02/09/2024    Dr Medina [facility] [place] placement      Social History     Socioeconomic History    Marital status: Single     Spouse name: None    Number of children: None    Years of education: None    Highest education level: None   Tobacco Use    Smoking status: Never    Smokeless tobacco: Never   Vaping Use    Vaping status: Never Used   Substance and Sexual Activity    Alcohol use: No    Drug use: No    Sexual activity: Never     Social Determinants of Health     Financial Resource Strain: Low Risk  (12/31/2024)    Overall Financial Resource Strain (CARDIA)     Difficulty of Paying

## 2025-01-09 ENCOUNTER — APPOINTMENT (OUTPATIENT)
Dept: GENERAL RADIOLOGY | Age: 25
End: 2025-01-09
Payer: MEDICAID

## 2025-01-09 ENCOUNTER — HOSPITAL ENCOUNTER (EMERGENCY)
Age: 25
Discharge: HOME | End: 2025-01-09
Payer: MEDICAID

## 2025-01-09 VITALS
TEMPERATURE: 97.9 F | RESPIRATION RATE: 17 BRPM | WEIGHT: 220 LBS | HEIGHT: 61 IN | BODY MASS INDEX: 41.54 KG/M2 | DIASTOLIC BLOOD PRESSURE: 82 MMHG | SYSTOLIC BLOOD PRESSURE: 135 MMHG | OXYGEN SATURATION: 98 % | HEART RATE: 77 BPM

## 2025-01-09 DIAGNOSIS — T85.598A FEEDING TUBE DYSFUNCTION, INITIAL ENCOUNTER: Primary | ICD-10-CM

## 2025-01-09 PROCEDURE — 6360000004 HC RX CONTRAST MEDICATION: Performed by: PHYSICIAN ASSISTANT

## 2025-01-09 PROCEDURE — 99283 EMERGENCY DEPT VISIT LOW MDM: CPT

## 2025-01-09 PROCEDURE — 74018 RADEX ABDOMEN 1 VIEW: CPT

## 2025-01-09 RX ORDER — DIATRIZOATE MEGLUMINE AND DIATRIZOATE SODIUM 660; 100 MG/ML; MG/ML
120 SOLUTION ORAL; RECTAL
Status: DISCONTINUED | OUTPATIENT
Start: 2025-01-09 | End: 2025-01-09 | Stop reason: HOSPADM

## 2025-01-09 RX ORDER — DIATRIZOATE MEGLUMINE AND DIATRIZOATE SODIUM 660; 100 MG/ML; MG/ML
5 SOLUTION ORAL; RECTAL
Status: DISCONTINUED | OUTPATIENT
Start: 2025-01-09 | End: 2025-01-09

## 2025-01-09 RX ADMIN — DIATRIZOATE MEGLUMINE AND DIATRIZOATE SODIUM 5 ML: 660; 100 LIQUID ORAL; RECTAL at 19:30

## 2025-01-09 ASSESSMENT — ENCOUNTER SYMPTOMS
SHORTNESS OF BREATH: 0
COUGH: 0
EYE DISCHARGE: 0
PHOTOPHOBIA: 0
NAUSEA: 0
COLOR CHANGE: 0
BACK PAIN: 0
ABDOMINAL DISTENTION: 0
ABDOMINAL PAIN: 1
EYE PAIN: 0
APNEA: 0
RHINORRHEA: 0
SORE THROAT: 0

## 2025-01-09 ASSESSMENT — PAIN DESCRIPTION - LOCATION: LOCATION: ABDOMEN

## 2025-01-09 ASSESSMENT — PAIN - FUNCTIONAL ASSESSMENT: PAIN_FUNCTIONAL_ASSESSMENT: 0-10

## 2025-01-09 ASSESSMENT — PAIN SCALES - GENERAL: PAINLEVEL_OUTOF10: 6

## 2025-01-10 NOTE — ED PROVIDER NOTES
I evaluated this patient.  She has no abdominal tenderness on exam.  She has had no vomiting.  She has a benign abdomen and normal vital signs.  Her x-ray 2 view shows that her GJ tube is in place, though we discussed that the jejunal tip is in the duodenal bulb.  There is no evidence that is actually dislodged and she says it flushed in radiology without complication.  She has not tried feeds through it.  Discussed that CT is an option for further characterization although this is likely unnecessary but I did  that she will need to contact her doctor who placed it tomorrow about its placement and we made a plan to share images for follow-up and return precautions were given     Jorge L Herrera MD  01/09/25 7016    
Coordination normal.   Psychiatric:         Behavior: Behavior normal.         Thought Content: Thought content normal.           DIAGNOSTIC RESULTS     RADIOLOGY:   Non-plain film images such as CT, Ultrasound and MRI are read by the radiologist. Plain radiographic images are visualized and preliminarilyinterpreted by No att. providers found with the below findings:        Interpretation per the Radiologist below, if available at the time of this note:    XR ABDOMEN (KUB) (SINGLE AP VIEW)   Final Result       - Possible retraction of the gastrojejunostomy tube with its tip ending near the duodenal bulb.  No contrast leak.               .           ______________________________________    Electronically signed by: ALCIRA ESTEBAN D.O.   Date:     01/09/2025   Time:    19:58           LABS:  Labs Reviewed - No data to display    All other labs were within normal range or notreturned as of this dictation.    RE-ASSESSMENT          EMERGENCY DEPARTMENT COURSE and DIFFERENTIAL DIAGNOSIS/MDM:   Vitals:    Vitals:    01/09/25 1804   BP: 135/82   Pulse: 77   Resp: 17   Temp: 97.9 °F (36.6 °C)   TempSrc: Oral   SpO2: 98%   Weight: 99.8 kg (220 lb)   Height: 1.549 m (5' 1\")           MDM  My attending has evaluated as well per imaging looks like it is an appropriate placement and Gastrografin was able to be injected into the GJ tube she is taken photos of this with her phone will get the disc for her she will follow closely with her GI doctor in [place] as typically most surgeons locally do not do much intervention when it comes to gastroparesis.    PROCEDURES:    Procedures      FINAL IMPRESSION      1. Feeding tube dysfunction, initial encounter          DISPOSITION/PLAN   DISPOSITION Decision To Discharge 01/09/2025 08:44:18 PM   DISPOSITION CONDITION Stable           PATIENT REFERRED TO:  Janet Breen MD  [address] [place] [address] [address]  [facility] [address]  [phone]            DISCHARGE

## 2025-04-22 ENCOUNTER — OFFICE VISIT (OUTPATIENT)
Age: 25
End: 2025-04-22
Payer: COMMERCIAL

## 2025-04-22 VITALS
BODY MASS INDEX: 41.16 KG/M2 | DIASTOLIC BLOOD PRESSURE: 76 MMHG | HEIGHT: 61 IN | RESPIRATION RATE: 18 BRPM | HEART RATE: 96 BPM | TEMPERATURE: 97.5 F | WEIGHT: 218 LBS | OXYGEN SATURATION: 96 % | SYSTOLIC BLOOD PRESSURE: 124 MMHG

## 2025-04-22 DIAGNOSIS — H65.193 OTHER NON-RECURRENT ACUTE NONSUPPURATIVE OTITIS MEDIA OF BOTH EARS: Primary | ICD-10-CM

## 2025-04-22 DIAGNOSIS — R05.1 ACUTE COUGH: ICD-10-CM

## 2025-04-22 PROCEDURE — G8417 CALC BMI ABV UP PARAM F/U: HCPCS

## 2025-04-22 PROCEDURE — 99213 OFFICE O/P EST LOW 20 MIN: CPT

## 2025-04-22 PROCEDURE — 1036F TOBACCO NON-USER: CPT

## 2025-04-22 PROCEDURE — G8427 DOCREV CUR MEDS BY ELIG CLIN: HCPCS

## 2025-04-22 RX ORDER — BENZONATATE 100 MG/1
100-200 CAPSULE ORAL 3 TIMES DAILY PRN
Qty: 42 CAPSULE | Refills: 0 | Status: SHIPPED | OUTPATIENT
Start: 2025-04-22 | End: 2025-04-29

## 2025-04-22 ASSESSMENT — ENCOUNTER SYMPTOMS
DIARRHEA: 0
SORE THROAT: 0
CHEST TIGHTNESS: 0
WHEEZING: 0
APNEA: 0
NAUSEA: 0
COUGH: 1
ABDOMINAL DISTENTION: 0
VOMITING: 0
ABDOMINAL PAIN: 0
SINUS PRESSURE: 0
COLOR CHANGE: 0
CONSTIPATION: 0
RHINORRHEA: 0
SHORTNESS OF BREATH: 0
EYE DISCHARGE: 0
SINUS PAIN: 0
EYE PAIN: 0
EYE ITCHING: 0
TROUBLE SWALLOWING: 0

## 2025-04-22 NOTE — PROGRESS NOTES
[facility]  [facility]  [address] [place] [id number]  Dept: [phone]  Dept Fax: [phone]  Loc: [phone]    Elena Swartz is a 25 y.o. female who presents today for her medical conditions/complaints as noted below.      HPI:   HPI    Patient presents for evaluation of a dry cough, bilateral otalgia, and sinus congestion x 8-9 days. Reports cough does not keep her up at night and is worse during the day. Has used Mucinex and Sudafed OTC without relief. Reports that the first 3 days she was sick she did have a fever, but none since. Temperature max was 101.9. 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     Past Medical History:   Diagnosis Date    ADHD (attention deficit hyperactivity disorder)     Anxiety     Gastroparesis     Gastroparesis     Pt has a feeding tube    Near syncope     Palpitation     PCOS (polycystic ovarian syndrome)     Seasonal allergies     Vertigo     related to severe allergy syndrome        Past Surgical History:   Procedure Laterality Date    GASTROSTOMY TUBE PLACEMENT  05/08/2024    with duodenum placement    PYLOROPLASTY  07/24/2023    Laparoscopic Pyloroplasty  [facility]    PYLOROPLASTY  07/23/2023    SINUS ENDOSCOPY  2019    UPPER GASTROINTESTINAL ENDOSCOPY  01/08/2021    Jt Almeida MD-Gastritis, no h pylori    UPPER GASTROINTESTINAL ENDOSCOPY  11/14/2019    Dr Wolf-No celiac    UPPER GASTROINTESTINAL ENDOSCOPY N/A 02/28/2023    Dr ELISA Robles-Gastritis, no sprue or h pylori    UPPER GASTROINTESTINAL ENDOSCOPY  02/09/2024    Dr Medina [facility] [place]       Family History   Problem Relation Age of Onset    No Known Problems Mother     Heart Disease Father     Atrial Fibrillation Father     No Known Problems Sister     Breast Cancer Maternal Grandmother     Atrial Fibrillation Maternal Grandfather     Diabetes Paternal Grandmother

## 2025-04-22 NOTE — PATIENT INSTRUCTIONS
Take full course of antibiotic, Augmentin, even if symptoms start to improve.  Take Tessalon Perles as needed for cough.  May continue Sudafed and Mucinex use over-the-counter.  May take Tylenol or ibuprofen over-the-counter for pain/fever, unless otherwise told by primary care provider or specialist.  Follow-up with primary care provider if symptoms do not improve.

## 2025-07-04 ENCOUNTER — OFFICE VISIT (OUTPATIENT)
Age: 25
End: 2025-07-04

## 2025-07-04 VITALS
SYSTOLIC BLOOD PRESSURE: 128 MMHG | OXYGEN SATURATION: 98 % | BODY MASS INDEX: 42.25 KG/M2 | DIASTOLIC BLOOD PRESSURE: 62 MMHG | WEIGHT: 223.6 LBS | TEMPERATURE: 97.6 F | HEART RATE: 78 BPM | RESPIRATION RATE: 20 BRPM

## 2025-07-04 DIAGNOSIS — H66.93 BILATERAL ACUTE OTITIS MEDIA: Primary | ICD-10-CM

## 2025-07-04 RX ORDER — AMOXICILLIN AND CLAVULANATE POTASSIUM 500; 125 MG/1; MG/1
1 TABLET, FILM COATED ORAL 2 TIMES DAILY
Qty: 14 TABLET | Refills: 0 | Status: SHIPPED | OUTPATIENT
Start: 2025-07-04 | End: 2025-07-04 | Stop reason: RX

## 2025-07-04 NOTE — PROGRESS NOTES
[facility], [facility] ([place])  [facility] [address]  [facility] [address]  Dept: [phone]  Dept Fax: [phone]    Elena Swartz is a 25 y.o. female who presents today for her medical conditions/complaints as noted below.  Elena Swartz is complaining of Ear Pain (Patient stated that both ears have been hurting. )      HPI:     Elena Swartz presents to the clinic for evaluation of bilateral ear pain (L>R).  No alleviating factors reported for her symptoms.  Denies cough, congestion, ear drainage, fever, sore throat.  No recent illness or antibiotic usage reported.        Past Medical History:   Diagnosis Date    ADHD (attention deficit hyperactivity disorder)     Anxiety     Gastroparesis     Gastroparesis     Pt has a feeding tube    Near syncope     Palpitation     PCOS (polycystic ovarian syndrome)     Seasonal allergies     Vertigo     related to severe allergy syndrome        Past Surgical History:   Procedure Laterality Date    GASTROSTOMY TUBE PLACEMENT  05/08/2024    with duodenum placement    PYLOROPLASTY  07/24/2023    Laparoscopic Pyloroplasty  Dr ISIDRA [facility]    PYLOROPLASTY  07/23/2023    SINUS ENDOSCOPY  2019    UPPER GASTROINTESTINAL ENDOSCOPY  01/08/2021    Jt Almeida MD-Gastritis, no h pylori    UPPER GASTROINTESTINAL ENDOSCOPY  11/14/2019    Dr Wolf-No celiac    UPPER GASTROINTESTINAL ENDOSCOPY N/A 02/28/2023    Dr ELISA Robles-Gastritis, no sprue or h pylori    UPPER GASTROINTESTINAL ENDOSCOPY  02/09/2024    Dr [facility]ina [facility] [place]       Family History   Problem Relation Age of Onset    No Known Problems Mother     Heart Disease Father     Atrial Fibrillation Father     No Known Problems Sister     Breast Cancer Maternal Grandmother     Atrial Fibrillation Maternal Grandfather     Diabetes Paternal Grandmother     Alzheimer's Disease Paternal Grandmother     No Known Problems Paternal Grandfather     Colon

## 2025-07-04 NOTE — PATIENT INSTRUCTIONS
- Begin Augmentin antibiotic. Take full course of antibiotic, even if symptoms improve.   - Increase fluid intake  - Recommended OTC flonase  - Recommended OTC antihistamine, such as Claritin or Zyrtec  - May apply warm compress to external ear for comfort.   - Tylenol / Motrin for pain/fever, unless contraindicated.   - The patient is to follow up with PCP or return to clinic if symptoms worsen/fail to improve.

## 2025-07-16 ENCOUNTER — OFFICE VISIT (OUTPATIENT)
Dept: INTERNAL MEDICINE | Age: 25
End: 2025-07-16
Payer: COMMERCIAL

## 2025-07-16 VITALS
SYSTOLIC BLOOD PRESSURE: 124 MMHG | HEART RATE: 76 BPM | DIASTOLIC BLOOD PRESSURE: 78 MMHG | OXYGEN SATURATION: 99 % | HEIGHT: 61 IN | BODY MASS INDEX: 41.72 KG/M2 | WEIGHT: 221 LBS

## 2025-07-16 DIAGNOSIS — K31.84 GASTROPARESIS: ICD-10-CM

## 2025-07-16 DIAGNOSIS — Z13.29 SCREENING FOR THYROID DISORDER: ICD-10-CM

## 2025-07-16 DIAGNOSIS — H72.91 PERFORATION OF RIGHT TYMPANIC MEMBRANE: Primary | ICD-10-CM

## 2025-07-16 DIAGNOSIS — Z13.1 SCREENING FOR DIABETES MELLITUS: ICD-10-CM

## 2025-07-16 DIAGNOSIS — I10 PRIMARY HYPERTENSION: ICD-10-CM

## 2025-07-16 DIAGNOSIS — Z13.21 ENCOUNTER FOR VITAMIN DEFICIENCY SCREENING: ICD-10-CM

## 2025-07-16 DIAGNOSIS — F41.9 ANXIETY DISORDER, UNSPECIFIED TYPE: ICD-10-CM

## 2025-07-16 DIAGNOSIS — H66.91 RIGHT OTITIS MEDIA, UNSPECIFIED OTITIS MEDIA TYPE: ICD-10-CM

## 2025-07-16 DIAGNOSIS — Z13.220 SCREENING, LIPID: ICD-10-CM

## 2025-07-16 PROCEDURE — G8427 DOCREV CUR MEDS BY ELIG CLIN: HCPCS | Performed by: INTERNAL MEDICINE

## 2025-07-16 PROCEDURE — 3078F DIAST BP <80 MM HG: CPT | Performed by: INTERNAL MEDICINE

## 2025-07-16 PROCEDURE — 3074F SYST BP LT 130 MM HG: CPT | Performed by: INTERNAL MEDICINE

## 2025-07-16 PROCEDURE — 99214 OFFICE O/P EST MOD 30 MIN: CPT | Performed by: INTERNAL MEDICINE

## 2025-07-16 PROCEDURE — 1036F TOBACCO NON-USER: CPT | Performed by: INTERNAL MEDICINE

## 2025-07-16 PROCEDURE — G8417 CALC BMI ABV UP PARAM F/U: HCPCS | Performed by: INTERNAL MEDICINE

## 2025-07-16 RX ORDER — CEFDINIR 300 MG/1
300 CAPSULE ORAL 2 TIMES DAILY
Qty: 20 CAPSULE | Refills: 0 | Status: SHIPPED | OUTPATIENT
Start: 2025-07-16 | End: 2025-07-17

## 2025-07-16 RX ORDER — PREDNISONE 10 MG/1
10 TABLET ORAL 2 TIMES DAILY
Qty: 10 TABLET | Refills: 0 | Status: SHIPPED | OUTPATIENT
Start: 2025-07-16 | End: 2025-07-17

## 2025-07-16 SDOH — ECONOMIC STABILITY: FOOD INSECURITY: WITHIN THE PAST 12 MONTHS, THE FOOD YOU BOUGHT JUST DIDN'T LAST AND YOU DIDN'T HAVE MONEY TO GET MORE.: NEVER TRUE

## 2025-07-16 SDOH — ECONOMIC STABILITY: FOOD INSECURITY: WITHIN THE PAST 12 MONTHS, YOU WORRIED THAT YOUR FOOD WOULD RUN OUT BEFORE YOU GOT MONEY TO BUY MORE.: NEVER TRUE

## 2025-07-16 ASSESSMENT — ENCOUNTER SYMPTOMS
FACIAL SWELLING: 0
ABDOMINAL DISTENTION: 1
RECTAL PAIN: 0
EYE PAIN: 0
SINUS PRESSURE: 0
PHOTOPHOBIA: 0
TROUBLE SWALLOWING: 0
DIARRHEA: 1
WHEEZING: 0
VOICE CHANGE: 0
CHEST TIGHTNESS: 0
ABDOMINAL PAIN: 1
VOMITING: 0
EYE DISCHARGE: 0
COUGH: 0
BLOOD IN STOOL: 0
RHINORRHEA: 0
EYE ITCHING: 0
EYE REDNESS: 0
NAUSEA: 1
CHOKING: 0
BACK PAIN: 0
CONSTIPATION: 1
COLOR CHANGE: 0
ANAL BLEEDING: 0
SHORTNESS OF BREATH: 0
SORE THROAT: 0

## 2025-07-16 ASSESSMENT — PATIENT HEALTH QUESTIONNAIRE - PHQ9
4. FEELING TIRED OR HAVING LITTLE ENERGY: NOT AT ALL
6. FEELING BAD ABOUT YOURSELF - OR THAT YOU ARE A FAILURE OR HAVE LET YOURSELF OR YOUR FAMILY DOWN: NOT AT ALL
8. MOVING OR SPEAKING SO SLOWLY THAT OTHER PEOPLE COULD HAVE NOTICED. OR THE OPPOSITE, BEING SO FIGETY OR RESTLESS THAT YOU HAVE BEEN MOVING AROUND A LOT MORE THAN USUAL: NOT AT ALL
5. POOR APPETITE OR OVEREATING: NOT AT ALL
SUM OF ALL RESPONSES TO PHQ QUESTIONS 1-9: 0
1. LITTLE INTEREST OR PLEASURE IN DOING THINGS: NOT AT ALL
SUM OF ALL RESPONSES TO PHQ QUESTIONS 1-9: 0
3. TROUBLE FALLING OR STAYING ASLEEP: NOT AT ALL
SUM OF ALL RESPONSES TO PHQ QUESTIONS 1-9: 0
7. TROUBLE CONCENTRATING ON THINGS, SUCH AS READING THE NEWSPAPER OR WATCHING TELEVISION: NOT AT ALL
9. THOUGHTS THAT YOU WOULD BE BETTER OFF DEAD, OR OF HURTING YOURSELF: NOT AT ALL
SUM OF ALL RESPONSES TO PHQ QUESTIONS 1-9: 0
2. FEELING DOWN, DEPRESSED OR HOPELESS: NOT AT ALL
10. IF YOU CHECKED OFF ANY PROBLEMS, HOW DIFFICULT HAVE THESE PROBLEMS MADE IT FOR YOU TO DO YOUR WORK, TAKE CARE OF THINGS AT HOME, OR GET ALONG WITH OTHER PEOPLE: NOT DIFFICULT AT ALL

## 2025-07-16 NOTE — PROGRESS NOTES
Neck:      Thyroid: No thyromegaly.      Vascular: No carotid bruit or JVD.      Trachea: No tracheal deviation.   Cardiovascular:      Rate and Rhythm: Normal rate and regular rhythm.      Pulses: Normal pulses.      Heart sounds: Normal heart sounds. No murmur heard.     No friction rub. No gallop.   Pulmonary:      Effort: Pulmonary effort is normal. No respiratory distress.      Breath sounds: Normal breath sounds. No stridor. No wheezing, rhonchi or rales.   Chest:      Chest wall: No tenderness.   Abdominal:      General: Bowel sounds are normal. There is no distension.      Palpations: Abdomen is soft. There is no mass.      Tenderness: There is no abdominal tenderness. There is no right CVA tenderness, left CVA tenderness, guarding or rebound.      Hernia: No hernia is present.      Comments: Patient has a GJ tube in place.   Musculoskeletal:         General: No swelling, tenderness, deformity or signs of injury. Normal range of motion.      Cervical back: Normal range of motion and neck supple. No rigidity. No muscular tenderness.      Right lower leg: No edema.      Left lower leg: No edema.   Lymphadenopathy:      Cervical: No cervical adenopathy.   Skin:     General: Skin is warm and dry.      Coloration: Skin is not jaundiced or pale.      Findings: No bruising, erythema, lesion or rash.      Comments: y   Neurological:      General: No focal deficit present.      Mental Status: She is alert and oriented to person, place, and time. Mental status is at baseline.      Cranial Nerves: No cranial nerve deficit.      Motor: No weakness or abnormal muscle tone.      Coordination: Coordination normal.      Gait: Gait normal.      Deep Tendon Reflexes: Reflexes are normal and symmetric. Reflexes normal.   Psychiatric:         Mood and Affect: Mood normal.         Behavior: Behavior normal.         Thought Content: Thought content normal.         Judgment: Judgment normal.         1. Perforation of right

## 2025-07-17 ENCOUNTER — OFFICE VISIT (OUTPATIENT)
Dept: ENT CLINIC | Age: 25
End: 2025-07-17
Payer: COMMERCIAL

## 2025-07-17 VITALS
BODY MASS INDEX: 41.72 KG/M2 | SYSTOLIC BLOOD PRESSURE: 132 MMHG | DIASTOLIC BLOOD PRESSURE: 88 MMHG | WEIGHT: 221 LBS | HEIGHT: 61 IN

## 2025-07-17 DIAGNOSIS — H69.93 ETD (EUSTACHIAN TUBE DYSFUNCTION), BILATERAL: Primary | ICD-10-CM

## 2025-07-17 PROCEDURE — 92504 EAR MICROSCOPY EXAMINATION: CPT | Performed by: NURSE PRACTITIONER

## 2025-07-17 PROCEDURE — 99203 OFFICE O/P NEW LOW 30 MIN: CPT | Performed by: NURSE PRACTITIONER

## 2025-07-17 PROCEDURE — 1036F TOBACCO NON-USER: CPT | Performed by: NURSE PRACTITIONER

## 2025-07-17 PROCEDURE — G8427 DOCREV CUR MEDS BY ELIG CLIN: HCPCS | Performed by: NURSE PRACTITIONER

## 2025-07-17 PROCEDURE — G8417 CALC BMI ABV UP PARAM F/U: HCPCS | Performed by: NURSE PRACTITIONER

## 2025-07-17 RX ORDER — PREDNISONE 20 MG/1
TABLET ORAL
Qty: 21 TABLET | Refills: 0 | Status: SHIPPED | OUTPATIENT
Start: 2025-07-17

## 2025-07-17 ASSESSMENT — ENCOUNTER SYMPTOMS
RESPIRATORY NEGATIVE: 1
EYES NEGATIVE: 1
GASTROINTESTINAL NEGATIVE: 1
ALLERGIC/IMMUNOLOGIC NEGATIVE: 1

## 2025-07-17 NOTE — PROGRESS NOTES
2025    Elena Swartz (:  2000) is a 25 y.o. female, Established patient, here for evaluation of the following chief complaint(s):  New Patient (ears)      Vitals:    25 0848   BP: 132/88   Weight: 100.2 kg (221 lb)   Height: 1.549 m (5' 1\")       Wt Readings from Last 3 Encounters:   25 100.2 kg (221 lb)   25 100.2 kg (221 lb)   25 101.4 kg (223 lb 9.6 oz)       BP Readings from Last 3 Encounters:   25 132/88   25 124/78   25 128/62         SUBJECTIVE/OBJECTIVE:    Patient seen today for her ears. She reports that she had a double ear infection in April that went away, but then her ears started to bother her again on . She went to urgent care and was given 7 days of amoxicillin that did not help. She went to her PCP yesterday and was told she had an ear infection still and that her right ear drum was perforated. She was given Omnicef and prednisone. She has started the antibiotic but not the prednisone. She complains of some vertigo, fullness, pain, right sided muffled hearing, and tinnitus that is worse on the right. She does currently take Zyrtec. She denies ear drainage.        Review of Systems   Constitutional: Negative.    HENT:  Positive for ear pain (pain and fullness), hearing loss (muffled, right) and tinnitus (R>L).    Eyes: Negative.    Respiratory: Negative.     Cardiovascular: Negative.    Gastrointestinal: Negative.    Endocrine: Negative.    Musculoskeletal: Negative.    Skin: Negative.    Allergic/Immunologic: Negative.    Neurological:  Positive for dizziness.   Hematological: Negative.    Psychiatric/Behavioral: Negative.          Physical Exam  Vitals reviewed.   Constitutional:       Appearance: Normal appearance. She is normal weight.   HENT:      Head: Normocephalic and atraumatic.      Right Ear: Tympanic membrane, ear canal and external ear normal.      Left Ear: Tympanic membrane, ear canal and external ear normal.

## 2025-07-23 DIAGNOSIS — H69.93 ETD (EUSTACHIAN TUBE DYSFUNCTION), BILATERAL: Primary | ICD-10-CM

## 2025-07-23 DIAGNOSIS — R42 DIZZINESS: ICD-10-CM

## 2025-07-23 RX ORDER — MECLIZINE HCL 12.5 MG 12.5 MG/1
12.5 TABLET ORAL 3 TIMES DAILY PRN
Qty: 30 TABLET | Refills: 0 | Status: SHIPPED | OUTPATIENT
Start: 2025-07-23 | End: 2025-08-02

## 2025-07-23 RX ORDER — FLUTICASONE PROPIONATE 50 MCG
1 SPRAY, SUSPENSION (ML) NASAL DAILY
Qty: 32 G | Refills: 1 | Status: SHIPPED | OUTPATIENT
Start: 2025-07-23

## 2025-07-29 ENCOUNTER — TELEPHONE (OUTPATIENT)
Dept: CARDIOLOGY CLINIC | Age: 25
End: 2025-07-29

## 2025-08-07 ENCOUNTER — PROCEDURE VISIT (OUTPATIENT)
Dept: ENT CLINIC | Age: 25
End: 2025-08-07
Payer: COMMERCIAL

## 2025-08-07 ENCOUNTER — OFFICE VISIT (OUTPATIENT)
Dept: ENT CLINIC | Age: 25
End: 2025-08-07
Payer: COMMERCIAL

## 2025-08-07 VITALS
BODY MASS INDEX: 40.59 KG/M2 | HEIGHT: 61 IN | WEIGHT: 215 LBS | SYSTOLIC BLOOD PRESSURE: 115 MMHG | DIASTOLIC BLOOD PRESSURE: 80 MMHG

## 2025-08-07 DIAGNOSIS — H92.03 OTALGIA OF BOTH EARS: ICD-10-CM

## 2025-08-07 DIAGNOSIS — Z91.09 ENVIRONMENTAL ALLERGIES: ICD-10-CM

## 2025-08-07 DIAGNOSIS — Z01.10 ENCOUNTER FOR HEARING EXAMINATION WITHOUT ABNORMAL FINDINGS: Primary | ICD-10-CM

## 2025-08-07 DIAGNOSIS — R42 VERTIGO: Primary | ICD-10-CM

## 2025-08-07 PROCEDURE — G8417 CALC BMI ABV UP PARAM F/U: HCPCS | Performed by: NURSE PRACTITIONER

## 2025-08-07 PROCEDURE — 92567 TYMPANOMETRY: CPT | Performed by: AUDIOLOGIST-HEARING AID FITTER

## 2025-08-07 PROCEDURE — G8427 DOCREV CUR MEDS BY ELIG CLIN: HCPCS | Performed by: NURSE PRACTITIONER

## 2025-08-07 PROCEDURE — 92557 COMPREHENSIVE HEARING TEST: CPT | Performed by: AUDIOLOGIST-HEARING AID FITTER

## 2025-08-07 PROCEDURE — 99213 OFFICE O/P EST LOW 20 MIN: CPT | Performed by: NURSE PRACTITIONER

## 2025-08-07 PROCEDURE — 1036F TOBACCO NON-USER: CPT | Performed by: NURSE PRACTITIONER

## 2025-08-07 RX ORDER — MONTELUKAST SODIUM 10 MG/1
10 TABLET ORAL NIGHTLY
Qty: 30 TABLET | Refills: 1 | Status: SHIPPED | OUTPATIENT
Start: 2025-08-07

## 2025-08-07 RX ORDER — MELOXICAM 7.5 MG/1
7.5 TABLET ORAL DAILY PRN
Qty: 30 TABLET | Refills: 1 | Status: SHIPPED | OUTPATIENT
Start: 2025-08-07

## 2025-08-07 ASSESSMENT — ENCOUNTER SYMPTOMS
EYES NEGATIVE: 1
ALLERGIC/IMMUNOLOGIC NEGATIVE: 1
RESPIRATORY NEGATIVE: 1
SINUS PRESSURE: 1
GASTROINTESTINAL NEGATIVE: 1

## 2025-08-20 ENCOUNTER — OFFICE VISIT (OUTPATIENT)
Age: 25
End: 2025-08-20

## 2025-08-20 VITALS
TEMPERATURE: 97.2 F | SYSTOLIC BLOOD PRESSURE: 116 MMHG | OXYGEN SATURATION: 98 % | HEART RATE: 78 BPM | WEIGHT: 217 LBS | BODY MASS INDEX: 40.97 KG/M2 | DIASTOLIC BLOOD PRESSURE: 72 MMHG | HEIGHT: 61 IN | RESPIRATION RATE: 20 BRPM

## 2025-08-20 DIAGNOSIS — L08.9 SKIN INFECTION AT GASTROSTOMY TUBE SITE (HCC): Primary | ICD-10-CM

## 2025-08-20 DIAGNOSIS — K94.22 SKIN INFECTION AT GASTROSTOMY TUBE SITE (HCC): Primary | ICD-10-CM

## 2025-08-20 RX ORDER — CEPHALEXIN 500 MG/1
500 CAPSULE ORAL 3 TIMES DAILY
Qty: 21 CAPSULE | Refills: 0 | Status: SHIPPED | OUTPATIENT
Start: 2025-08-20 | End: 2025-08-27

## 2025-08-25 RX ORDER — ESCITALOPRAM OXALATE 20 MG/1
20 TABLET ORAL DAILY
Qty: 90 TABLET | Refills: 1 | Status: SHIPPED | OUTPATIENT
Start: 2025-08-25

## (undated) DEVICE — CONMED SCOPE SAVER BITE BLOCK, 20X27 MM: Brand: SCOPE SAVER

## (undated) DEVICE — ENDOGATOR AUXILIARY WATER JET CONNECTOR: Brand: ENDOGATOR

## (undated) DEVICE — FORCEPS BX L240CM JAW DIA2.8MM L CAP W/ NDL MIC MESH TOOTH

## (undated) DEVICE — FRCP BIOP COLD ENDOJAW ALLGTR W/NDL 2.8X2300MM BLU

## (undated) DEVICE — ENDO KIT,LOURDES HOSPITAL: Brand: MEDLINE INDUSTRIES, INC.

## (undated) DEVICE — TBG SMPL FLTR LINE NASL 02/C02 A/ BX/100

## (undated) DEVICE — THE CHANNEL CLEANING BRUSH IS A NYLON FLEXI BRUSH ATTACHED TO A FLEXIBLE PLASTIC SHEATH DESIGNED TO SAFELY REMOVE DEBRIS FROM FLEXIBLE ENDOSCOPES.

## (undated) DEVICE — Device: Brand: DEFENDO AIR/WATER/SUCTION AND BIOPSY VALVE

## (undated) DEVICE — CUFF,BP,DISP,1 TUBE,ADULT,HP: Brand: MEDLINE

## (undated) DEVICE — SENSR O2 OXIMAX FNGR A/ 18IN NONSTR

## (undated) DEVICE — YANKAUER,BULB TIP WITH VENT: Brand: ARGYLE